# Patient Record
Sex: MALE | Race: WHITE | Employment: PART TIME | URBAN - METROPOLITAN AREA
[De-identification: names, ages, dates, MRNs, and addresses within clinical notes are randomized per-mention and may not be internally consistent; named-entity substitution may affect disease eponyms.]

---

## 2021-06-03 ENCOUNTER — HOSPITAL ENCOUNTER (OUTPATIENT)
Dept: LAB | Age: 61
Discharge: HOME | End: 2021-06-03

## 2021-06-03 PROCEDURE — 88305 TISSUE EXAM BY PATHOLOGIST: CPT

## 2022-06-06 ENCOUNTER — OFFICE VISIT (OUTPATIENT)
Dept: UROLOGY | Age: 62
End: 2022-06-06
Payer: COMMERCIAL

## 2022-06-06 DIAGNOSIS — R97.20 ELEVATED PSA: Primary | ICD-10-CM

## 2022-06-06 LAB
BILIRUBIN, URINE, POC: NEGATIVE
BLOOD URINE, POC: NORMAL
GLUCOSE URINE, POC: NEGATIVE
KETONES, URINE, POC: NEGATIVE
LEUKOCYTE ESTERASE, URINE, POC: NEGATIVE
NITRITE, URINE, POC: NEGATIVE
PH, URINE, POC: 7 (ref 4.6–8)
PROTEIN,URINE, POC: NEGATIVE
SPECIFIC GRAVITY, URINE, POC: 1.01 (ref 1–1.03)
URINALYSIS CLARITY, POC: CLEAR
URINALYSIS COLOR, POC: NORMAL
UROBILINOGEN, POC: NORMAL

## 2022-06-06 PROCEDURE — 99214 OFFICE O/P EST MOD 30 MIN: CPT | Performed by: UROLOGY

## 2022-06-06 PROCEDURE — 81003 URINALYSIS AUTO W/O SCOPE: CPT | Performed by: UROLOGY

## 2022-06-06 ASSESSMENT — ENCOUNTER SYMPTOMS
EYE PAIN: 0
COUGH: 0
INDIGESTION: 0
HEARTBURN: 0
ABDOMINAL PAIN: 0
WHEEZING: 0
BLOOD IN STOOL: 0
NAUSEA: 0
CONSTIPATION: 0
SHORTNESS OF BREATH: 0
SKIN LESIONS: 0
EYE DISCHARGE: 0
BACK PAIN: 1
VOMITING: 0
DIARRHEA: 0

## 2022-06-06 NOTE — PROGRESS NOTES
[facility]  [address]  [address] [facility], [address] [facility]  [phone]    Stevenson Nance  : 1960    Chief Complaint   Patient presents with    New Patient    Elevated PSA        HPI     Stevenson Nance is a 63 y.o. male Referred by Dr. Bhargav Maradiaga for evaluation and treatment of elevated PSA. PSA 7.5 in 4-22. No previous values noted. No voiding problems. No family hx of prostate cancer. No weight loss or bone pain . Past Medical History:   Diagnosis Date    Bell's palsy 2019    Chronic back pain     Hypertension      Past Surgical History:   Procedure Laterality Date    BACK SURGERY      OTHER SURGICAL HISTORY      surgery for broken nose     Current Outpatient Medications   Medication Sig Dispense Refill    valsartan (DIOVAN) 80 MG tablet Take 80 mg by mouth daily       No current facility-administered medications for this visit. No Known Allergies  Social History     Socioeconomic History    Marital status:      Spouse name: Not on file    Number of children: Not on file    Years of education: Not on file    Highest education level: Not on file   Occupational History    Not on file   Tobacco Use    Smoking status: Never Smoker    Smokeless tobacco: Never Used   Vaping Use    Vaping Use: Never used   Substance and Sexual Activity    Alcohol use: Never    Drug use: Never    Sexual activity: Not on file   Other Topics Concern    Not on file   Social History Narrative    Not on file     Social Determinants of Health     Financial Resource Strain:     Difficulty of Paying Living Expenses: Not on file   Food Insecurity:     Worried About [address] in the Last Year: Not on file    [address] in the Last Year: Not on file   Transportation Needs:     Lack of Transportation (Medical): Not on file    Lack of Transportation (Non-Medical):  Not on file   Physical Activity:     Days of Exercise per Week: Not on file    Minutes of Exercise per Session: Not on file   Stress:     Feeling of Stress : Not on file   Social Connections:     Frequency of Communication with Friends and Family: Not on file    Frequency of Social Gatherings with Friends and Family: Not on file    Attends Mormonism Services: Not on file    Active Member of [address] [place] [address] or Organizations: Not on file    Attends Club or Organization Meetings: Not on file    Marital Status: Not on file   Intimate Partner Violence:     Fear of Current or Ex-Partner: Not on file    Emotionally Abused: Not on file    Physically Abused: Not on file    Sexually Abused: Not on file   Housing Stability:     Unable to Pay for Housing in the Last Year: Not on file    Number of [place] in the Last Year: Not on file    Unstable Housing in the Last Year: Not on file     Family History   Problem Relation Age of Onset    Hypertension Mother     Suicide Father     Cancer Child         testicular       Review of Systems  Constitutional:   Negative for fever, chills, appetite change, malaise/fatigue, headaches and weight loss. Skin:  Negative for skin lesions, rash and itching. Eyes:  Negative for visual disturbance, eye pain and eye discharge. ENT:  Negative for difficulty articulating words, pain swallowing, high frequency hearing loss and dry mouth. Respiratory:  Negative for cough, blood in sputum, shortness of breath and wheezing. Cardiovascular: Positive for hypertension. Negative for chest pain, irregular heartbeat, leg pain, leg swelling, regular rate and rhythm and varicose veins. GI:  Negative for nausea, vomiting, abdominal pain, blood in stool, constipation, diarrhea, indigestion and heartburn.   Genitourinary:  Negative for urinary burning, hematuria, flank pain, recurrent UTIs, history of urolithiasis, nocturia, slower stream, straining, urgency, leakage w/ urge, frequent urination, incomplete emptying, erectile dysfunction, testicular pain, sexually transmitted disease, discharge and urethral stricture. Musculoskeletal: Positive for back pain. Negative for bone pain, arthralgias, tenderness, muscle weakness and neck pain. Neurological:  Negative for dizziness, focal weakness, numbness, seizures and tremors. Psychological:  Negative for depression and psychiatric problem. Endocrine:  Negative for cold intolerance, thirst, excessive urination, fatigue and heat intolerance. Hem/Lymphatic:  Negative for easy bleeding, easy bruising and frequent infections. There were no vitals taken for this visit. Physical Exam  General   Mental Status - Patient is alert and oriented X3. General Appearance - Not in acute distress. Build & Nutrition - Well nourished and Well developed. Gait - Normal.      Eye   Pupil - Bilateral - Normal, Equal and Round. Chest and Lung Exam   Auscultation:   Lung Sounds: - Normal, clear to auscultation. Cardiovascular   Cardiovascular examination reveals  - normal heart sounds, regular rate and rhythm with no murmurs. Abdomen   Palpation/Percussion: Palpation and Percussion of the abdomen reveal - No Rebound tenderness, No Rigidity (guarding), No hepatosplenomegaly and No Palpable abdominal masses. Auscultation: Auscultation of the abdomen reveals - Bowel sounds normal.      Male Genitourinary   Evaluation of genitourinary system reveals - scrotum non-tender, no masses, normal testes, no palpable masses, normal epididymides, urethral meatus normal, no discharge, normal penis and normal seminal vesicles. Rectal   Anorectal Exam: Prostate -not enlarged, no nodules    Peripheral Vascular   Lower Extremity: Inspection - Bilateral - Inspection Normal.      Neurologic   Neurologic evaluation reveals  - upper and lower extremity deep tendon reflexes intact bilaterally . Cranial Nerves: - Cranial nerves are grossly intact.       Neuropsychiatric   The patient's mood and affect are described as  - normal.      Musculoskeletal  Global Assessment   Examination of related systems reveals - no generalized swelling or edema of extremities. Lymphatic  General Lymphatics   Description - No Generalized lymphadenopathy. Tenderness - Non Tender. Urinalysis  UA - Dipstick  Results for orders placed or performed in visit on 06/06/22   AMB POC URINALYSIS DIP STICK AUTO W/O MICRO   Result Value Ref Range    Color (UA POC) Anna     Clarity (UA POC) Clear     Glucose, Urine, POC Negative Negative    Bilirubin, Urine, POC Negative Negative    KETONES, Urine, POC Negative Negative    Specific Gravity, Urine, POC 1.015 1.001 - 1.035    Blood (UA POC) Trace-lysed Negative    pH, Urine, POC 7.0 4.6 - 8.0    Protein, Urine, POC Negative Negative    Urobilinogen, POC Normal     Nitrite, Urine, POC Negative Negative    Leukocyte Esterase, Urine, POC Negative Negative       UA - Micro  WBC - 0  RBC - 0  Bacteria - 0  Epith - 0        Assessment and Plan    ICD-10-CM    1. Elevated PSA  R97.20 AMB POC URINALYSIS DIP STICK AUTO W/O MICRO     PSA, Diagnostic       Orders Placed This Encounter   Procedures    PSA, Diagnostic     Standing Status:   Future     Standing Expiration Date:   6/6/2023    AMB POC URINALYSIS DIP STICK AUTO W/O MICRO   will repeat PSA in one week. Call patient with results  May need MRI and/or biopsy. Follow-up and Dispositions    · Return for call patient to arrange follow-up.

## 2022-06-14 DIAGNOSIS — R97.20 ELEVATED PSA: Primary | ICD-10-CM

## 2022-06-15 ENCOUNTER — NURSE ONLY (OUTPATIENT)
Dept: UROLOGY | Age: 62
End: 2022-06-15

## 2022-06-15 DIAGNOSIS — R97.20 ELEVATED PSA: ICD-10-CM

## 2022-06-15 LAB — PSA SERPL-MCNC: 8.5 NG/ML

## 2022-06-21 ENCOUNTER — TELEPHONE (OUTPATIENT)
Dept: UROLOGY | Age: 62
End: 2022-06-21

## 2022-06-21 DIAGNOSIS — R97.20 ELEVATED PSA: Primary | ICD-10-CM

## 2022-06-29 ENCOUNTER — HOSPITAL ENCOUNTER (OUTPATIENT)
Dept: MRI IMAGING | Age: 62
Discharge: HOME | End: 2022-07-02
Payer: COMMERCIAL

## 2022-06-29 DIAGNOSIS — R97.20 ELEVATED PSA: ICD-10-CM

## 2022-06-29 PROCEDURE — 6360000004 HC RX CONTRAST MEDICATION: Performed by: UROLOGY

## 2022-06-29 PROCEDURE — 72197 MRI PELVIS W/O & W/DYE: CPT

## 2022-06-29 PROCEDURE — A9579 GAD-BASE MR CONTRAST NOS,1ML: HCPCS | Performed by: UROLOGY

## 2022-06-29 RX ADMIN — GADOTERIDOL 20 ML: 279.3 INJECTION, SOLUTION INTRAVENOUS at 13:53

## 2022-07-12 ENCOUNTER — TELEPHONE (OUTPATIENT)
Dept: UROLOGY | Age: 62
End: 2022-07-12

## 2022-07-12 DIAGNOSIS — R97.20 ELEVATED PSA: Primary | ICD-10-CM

## 2022-07-12 NOTE — TELEPHONE ENCOUNTER
Surgery Date: 08/04/2022    Pre/assessment date: Phone Assessment    BT/ASA: None    Surgery Scheduler: Juanpablo Rose

## 2022-08-01 NOTE — PERIOP NOTE
Patient verified name and . Order for consent  found in EHR and matches case posting; patient verifies procedure. Type 1a surgery, PAT phone assessment complete. Orders  received. Labs per surgeon: UA s/h for pre-op  Labs per anesthesia protocol: none indicated    Patient answered medical/surgical history questions at their best of ability. All prior to admission medications documented in [facility]. Patient instructed to take the following medications the day of surgery according to anesthesia guidelines with a small sip of water: none  Hold all vitamins 7 days prior to surgery and NSAIDS 5 days prior to surgery. Prescription meds to hold:Do not take valsartan on morning of procedure  Patient instructed on the following:    > Arrive at [facility], time of arrival to be called the day before by 1700  > NPO after midnight, unless otherwise indicated, including gum, mints, and ice chips  > Responsible adult must drive patient to the hospital, stay during surgery, and patient will need supervision 24 hours after anesthesia  > Use antibacterial soap in shower the night before surgery and on the morning of surgery  > All piercings must be removed prior to arrival.    > Leave all valuables (money and jewelry) at home but bring insurance card and ID on DOS.   > You may be required to pay a deductible or co-pay on the day of your procedure. You can pre-pay by calling [phone] if your surgery is at the [facility] or [phone] if your surgery is at the [facility]. > Do not wear make-up, nail polish, lotions, cologne, perfumes, powders, or oil on skin. Artificial nails are not permitted.

## 2022-08-03 NOTE — PERIOP NOTE
Directly informed patient and or family member of pre op arrival time 200 on 8/4. All questions answered. Pre op instructions reviewed. Left contact information for any additional questions or needs.

## 2022-08-04 ENCOUNTER — HOSPITAL ENCOUNTER (OUTPATIENT)
Age: 62
Setting detail: OUTPATIENT SURGERY
Discharge: HOME | End: 2022-08-04
Attending: UROLOGY | Admitting: UROLOGY
Payer: COMMERCIAL

## 2022-08-04 ENCOUNTER — ANESTHESIA EVENT (OUTPATIENT)
Dept: SURGERY | Age: 62
End: 2022-08-04
Payer: COMMERCIAL

## 2022-08-04 ENCOUNTER — ANESTHESIA (OUTPATIENT)
Dept: SURGERY | Age: 62
End: 2022-08-04
Payer: COMMERCIAL

## 2022-08-04 VITALS
RESPIRATION RATE: 16 BRPM | HEIGHT: 70 IN | SYSTOLIC BLOOD PRESSURE: 111 MMHG | TEMPERATURE: 98 F | WEIGHT: 224 LBS | BODY MASS INDEX: 32.07 KG/M2 | HEART RATE: 70 BPM | DIASTOLIC BLOOD PRESSURE: 67 MMHG | OXYGEN SATURATION: 96 %

## 2022-08-04 DIAGNOSIS — R97.20 ELEVATED PSA: ICD-10-CM

## 2022-08-04 LAB
APPEARANCE UR: CLEAR
BACTERIA URNS QL MICRO: NEGATIVE /HPF
BILIRUB UR QL: NEGATIVE
CASTS URNS QL MICRO: ABNORMAL /LPF
COLOR UR: ABNORMAL
EPI CELLS #/AREA URNS HPF: ABNORMAL /HPF
GLUCOSE UR STRIP.AUTO-MCNC: NEGATIVE MG/DL
HGB UR QL STRIP: NEGATIVE
KETONES UR QL STRIP.AUTO: NEGATIVE MG/DL
LEUKOCYTE ESTERASE UR QL STRIP.AUTO: ABNORMAL
NITRITE UR QL STRIP.AUTO: NEGATIVE
PH UR STRIP: 5 [PH] (ref 5–9)
PROT UR STRIP-MCNC: NEGATIVE MG/DL
RBC #/AREA URNS HPF: ABNORMAL /HPF
SP GR UR REFRACTOMETRY: 1.02 (ref 1–1.02)
UROBILINOGEN UR QL STRIP.AUTO: 1 EU/DL (ref 0.2–1)
WBC URNS QL MICRO: ABNORMAL /HPF

## 2022-08-04 PROCEDURE — 7100000001 HC PACU RECOVERY - ADDTL 15 MIN: Performed by: UROLOGY

## 2022-08-04 PROCEDURE — 55700 PR BIOPSY OF PROSTATE,NEEDLE/PUNCH: CPT | Performed by: UROLOGY

## 2022-08-04 PROCEDURE — 6370000000 HC RX 637 (ALT 250 FOR IP): Performed by: ANESTHESIOLOGY

## 2022-08-04 PROCEDURE — 3600000012 HC SURGERY LEVEL 2 ADDTL 15MIN: Performed by: UROLOGY

## 2022-08-04 PROCEDURE — 3700000000 HC ANESTHESIA ATTENDED CARE: Performed by: UROLOGY

## 2022-08-04 PROCEDURE — 6360000002 HC RX W HCPCS: Performed by: UROLOGY

## 2022-08-04 PROCEDURE — 2500000003 HC RX 250 WO HCPCS: Performed by: REGISTERED NURSE

## 2022-08-04 PROCEDURE — 3600000002 HC SURGERY LEVEL 2 BASE: Performed by: UROLOGY

## 2022-08-04 PROCEDURE — 7100000010 HC PHASE II RECOVERY - FIRST 15 MIN: Performed by: UROLOGY

## 2022-08-04 PROCEDURE — 6360000002 HC RX W HCPCS: Performed by: REGISTERED NURSE

## 2022-08-04 PROCEDURE — 7100000000 HC PACU RECOVERY - FIRST 15 MIN: Performed by: UROLOGY

## 2022-08-04 PROCEDURE — 3700000001 HC ADD 15 MINUTES (ANESTHESIA): Performed by: UROLOGY

## 2022-08-04 PROCEDURE — 2580000003 HC RX 258: Performed by: UROLOGY

## 2022-08-04 PROCEDURE — 2580000003 HC RX 258: Performed by: ANESTHESIOLOGY

## 2022-08-04 PROCEDURE — 81001 URINALYSIS AUTO W/SCOPE: CPT

## 2022-08-04 PROCEDURE — 88305 TISSUE EXAM BY PATHOLOGIST: CPT

## 2022-08-04 PROCEDURE — 2580000003 HC RX 258: Performed by: REGISTERED NURSE

## 2022-08-04 PROCEDURE — 7100000011 HC PHASE II RECOVERY - ADDTL 15 MIN: Performed by: UROLOGY

## 2022-08-04 PROCEDURE — 76942 ECHO GUIDE FOR BIOPSY: CPT | Performed by: UROLOGY

## 2022-08-04 PROCEDURE — 2709999900 HC NON-CHARGEABLE SUPPLY: Performed by: UROLOGY

## 2022-08-04 RX ORDER — OXYCODONE HYDROCHLORIDE 5 MG/1
5 TABLET ORAL PRN
Status: COMPLETED | OUTPATIENT
Start: 2022-08-04 | End: 2022-08-04

## 2022-08-04 RX ORDER — EPHEDRINE SULFATE/0.9% NACL/PF 50 MG/5 ML
SYRINGE (ML) INTRAVENOUS PRN
Status: DISCONTINUED | OUTPATIENT
Start: 2022-08-04 | End: 2022-08-04 | Stop reason: SDUPTHER

## 2022-08-04 RX ORDER — SODIUM CHLORIDE, SODIUM LACTATE, POTASSIUM CHLORIDE, CALCIUM CHLORIDE 600; 310; 30; 20 MG/100ML; MG/100ML; MG/100ML; MG/100ML
INJECTION, SOLUTION INTRAVENOUS CONTINUOUS
Status: DISCONTINUED | OUTPATIENT
Start: 2022-08-04 | End: 2022-08-04 | Stop reason: HOSPADM

## 2022-08-04 RX ORDER — OXYCODONE HYDROCHLORIDE 5 MG/1
10 TABLET ORAL PRN
Status: COMPLETED | OUTPATIENT
Start: 2022-08-04 | End: 2022-08-04

## 2022-08-04 RX ORDER — SODIUM CHLORIDE 0.9 % (FLUSH) 0.9 %
5-40 SYRINGE (ML) INJECTION PRN
Status: DISCONTINUED | OUTPATIENT
Start: 2022-08-04 | End: 2022-08-04 | Stop reason: HOSPADM

## 2022-08-04 RX ORDER — MIDAZOLAM HYDROCHLORIDE 2 MG/2ML
2 INJECTION, SOLUTION INTRAMUSCULAR; INTRAVENOUS
Status: DISCONTINUED | OUTPATIENT
Start: 2022-08-04 | End: 2022-08-04 | Stop reason: HOSPADM

## 2022-08-04 RX ORDER — LIDOCAINE HYDROCHLORIDE 20 MG/ML
INJECTION, SOLUTION EPIDURAL; INFILTRATION; INTRACAUDAL; PERINEURAL PRN
Status: DISCONTINUED | OUTPATIENT
Start: 2022-08-04 | End: 2022-08-04 | Stop reason: SDUPTHER

## 2022-08-04 RX ORDER — CIPROFLOXACIN 500 MG/1
500 TABLET, FILM COATED ORAL 2 TIMES DAILY
Qty: 6 TABLET | Refills: 0 | Status: SHIPPED | OUTPATIENT
Start: 2022-08-04 | End: 2022-08-07

## 2022-08-04 RX ORDER — ACETAMINOPHEN 500 MG
1000 TABLET ORAL ONCE
Status: COMPLETED | OUTPATIENT
Start: 2022-08-04 | End: 2022-08-04

## 2022-08-04 RX ORDER — FENTANYL CITRATE 50 UG/ML
100 INJECTION, SOLUTION INTRAMUSCULAR; INTRAVENOUS
Status: DISCONTINUED | OUTPATIENT
Start: 2022-08-04 | End: 2022-08-04 | Stop reason: HOSPADM

## 2022-08-04 RX ORDER — HYDROMORPHONE HYDROCHLORIDE 2 MG/ML
0.5 INJECTION, SOLUTION INTRAMUSCULAR; INTRAVENOUS; SUBCUTANEOUS EVERY 5 MIN PRN
Status: DISCONTINUED | OUTPATIENT
Start: 2022-08-04 | End: 2022-08-04 | Stop reason: HOSPADM

## 2022-08-04 RX ORDER — PROPOFOL 10 MG/ML
INJECTION, EMULSION INTRAVENOUS CONTINUOUS PRN
Status: DISCONTINUED | OUTPATIENT
Start: 2022-08-04 | End: 2022-08-04 | Stop reason: SDUPTHER

## 2022-08-04 RX ORDER — ONDANSETRON 2 MG/ML
4 INJECTION INTRAMUSCULAR; INTRAVENOUS
Status: DISCONTINUED | OUTPATIENT
Start: 2022-08-04 | End: 2022-08-04 | Stop reason: HOSPADM

## 2022-08-04 RX ORDER — PROPOFOL 10 MG/ML
INJECTION, EMULSION INTRAVENOUS PRN
Status: DISCONTINUED | OUTPATIENT
Start: 2022-08-04 | End: 2022-08-04 | Stop reason: SDUPTHER

## 2022-08-04 RX ORDER — DIPHENHYDRAMINE HYDROCHLORIDE 50 MG/ML
12.5 INJECTION INTRAMUSCULAR; INTRAVENOUS
Status: DISCONTINUED | OUTPATIENT
Start: 2022-08-04 | End: 2022-08-04 | Stop reason: HOSPADM

## 2022-08-04 RX ORDER — SODIUM CHLORIDE 0.9 % (FLUSH) 0.9 %
5-40 SYRINGE (ML) INJECTION EVERY 12 HOURS SCHEDULED
Status: DISCONTINUED | OUTPATIENT
Start: 2022-08-04 | End: 2022-08-04 | Stop reason: HOSPADM

## 2022-08-04 RX ADMIN — CEFTRIAXONE 1000 MG: 1 INJECTION, POWDER, FOR SOLUTION INTRAMUSCULAR; INTRAVENOUS at 11:33

## 2022-08-04 RX ADMIN — SODIUM CHLORIDE, SODIUM LACTATE, POTASSIUM CHLORIDE, AND CALCIUM CHLORIDE: 600; 310; 30; 20 INJECTION, SOLUTION INTRAVENOUS at 11:50

## 2022-08-04 RX ADMIN — OXYCODONE 5 MG: 5 TABLET ORAL at 12:30

## 2022-08-04 RX ADMIN — PROPOFOL 140 MCG/KG/MIN: 10 INJECTION, EMULSION INTRAVENOUS at 11:27

## 2022-08-04 RX ADMIN — SODIUM CHLORIDE, SODIUM LACTATE, POTASSIUM CHLORIDE, AND CALCIUM CHLORIDE: 600; 310; 30; 20 INJECTION, SOLUTION INTRAVENOUS at 09:28

## 2022-08-04 RX ADMIN — LIDOCAINE HYDROCHLORIDE 100 MG: 20 INJECTION, SOLUTION EPIDURAL; INFILTRATION; INTRACAUDAL; PERINEURAL at 11:27

## 2022-08-04 RX ADMIN — Medication 10 MG: at 11:41

## 2022-08-04 RX ADMIN — PHENYLEPHRINE HYDROCHLORIDE 50 MCG: 10 INJECTION INTRAVENOUS at 11:41

## 2022-08-04 RX ADMIN — ACETAMINOPHEN 1000 MG: 500 TABLET, FILM COATED ORAL at 09:10

## 2022-08-04 RX ADMIN — PROPOFOL 50 MG: 10 INJECTION, EMULSION INTRAVENOUS at 11:27

## 2022-08-04 ASSESSMENT — PAIN DESCRIPTION - LOCATION: LOCATION: BUTTOCKS

## 2022-08-04 ASSESSMENT — PAIN - FUNCTIONAL ASSESSMENT
PAIN_FUNCTIONAL_ASSESSMENT: 0-10
PAIN_FUNCTIONAL_ASSESSMENT: 0-10

## 2022-08-04 ASSESSMENT — PAIN SCALES - GENERAL
PAINLEVEL_OUTOF10: 4
PAINLEVEL_OUTOF10: 4

## 2022-08-04 NOTE — H&P
Gianni Chandler  : 1960         Chief Complaint   Patient presents with    New Patient    Elevated PSA         HPI      Gianni Chandler is a 63 y.o. male Referred by Dr. Malena Mayen for evaluation and treatment of elevated PSA. PSA 7.5 in . No previous values noted. No voiding problems. No family hx of prostate cancer. No weight loss or bone pain . Past Medical History:   Diagnosis Date    Bell's palsy 2019    Chronic back pain      Hypertension              Past Surgical History:   Procedure Laterality Date    BACK SURGERY        OTHER SURGICAL HISTORY         surgery for broken nose             Current Outpatient Medications   Medication Sig Dispense Refill    valsartan (DIOVAN) 80 MG tablet Take 80 mg by mouth daily          No current facility-administered medications for this visit. No Known Allergies  Social History            Socioeconomic History    Marital status:        Spouse name: Not on file    Number of children: Not on file    Years of education: Not on file    Highest education level: Not on file   Occupational History    Not on file   Tobacco Use    Smoking status: Never Smoker    Smokeless tobacco: Never Used   Vaping Use    Vaping Use: Never used   Substance and Sexual Activity    Alcohol use: Never    Drug use: Never    Sexual activity: Not on file   Other Topics Concern    Not on file   Social History Narrative    Not on file      Social Determinants of Health          Financial Resource Strain:    Difficulty of Paying Living Expenses: Not on file   Food Insecurity:    Worried About [address] in the Last Year: Not on file    Ran Out of Food in the Last Year: Not on file   Transportation Needs:    Lack of Transportation (Medical): Not on file    Lack of Transportation (Non-Medical):  Not on file   Physical Activity:    Days of Exercise per Week: Not on file    Minutes of Exercise per Session: Not on file   Stress:    Feeling of Stress : Not on file   Social Connections:    Frequency of Communication with Friends and Family: Not on file    Frequency of Social Gatherings with Friends and Family: Not on file    Attends Christian Services: Not on file    Active Member of Clubs or Organizations: Not on file    Attends Club or Organization Meetings: Not on file    Marital Status: Not on file   Intimate Partner Violence:    Fear of Current or Ex-Partner: Not on file    Emotionally Abused: Not on file    Physically Abused: Not on file    Sexually Abused: Not on file   Housing Stability:    Unable to Pay for Housing in the Last Year: Not on file    Number of [place] in the Last Year: Not on file    Unstable Housing in the Last Year: Not on file            Family History   Problem Relation Age of Onset    Hypertension Mother      Suicide Father      Cancer Child           testicular         Review of Systems  Constitutional:   Negative for fever, chills, appetite change, malaise/fatigue, headaches and weight loss. Skin:  Negative for skin lesions, rash and itching. Eyes:  Negative for visual disturbance, eye pain and eye discharge. ENT:  Negative for difficulty articulating words, pain swallowing, high frequency hearing loss and dry mouth. Respiratory:  Negative for cough, blood in sputum, shortness of breath and wheezing. Cardiovascular: Positive for hypertension. Negative for chest pain, irregular heartbeat, leg pain, leg swelling, regular rate and rhythm and varicose veins. GI:  Negative for nausea, vomiting, abdominal pain, blood in stool, constipation, diarrhea, indigestion and heartburn. Genitourinary:  Negative for urinary burning, hematuria, flank pain, recurrent UTIs, history of urolithiasis, nocturia, slower stream, straining, urgency, leakage w/ urge, frequent urination, incomplete emptying, erectile dysfunction, testicular pain, sexually transmitted disease, discharge and urethral stricture. Musculoskeletal: Positive for back pain.  Negative for bone pain, arthralgias, tenderness, muscle weakness and neck pain. Neurological:  Negative for dizziness, focal weakness, numbness, seizures and tremors. Psychological:  Negative for depression and psychiatric problem. Endocrine:  Negative for cold intolerance, thirst, excessive urination, fatigue and heat intolerance. Hem/Lymphatic:  Negative for easy bleeding, easy bruising and frequent infections. There were no vitals taken for this visit. Physical Exam  General   Mental Status - Patient is alert and oriented X3. General Appearance - Not in acute distress. Build & Nutrition - Well nourished and Well developed. Gait - Normal.        Eye   Pupil - Bilateral - Normal, Equal and Round. Chest and Lung Exam   Auscultation:   Lung Sounds: - Normal, clear to auscultation. Cardiovascular   Cardiovascular examination reveals  - normal heart sounds, regular rate and rhythm with no murmurs. Abdomen   Palpation/Percussion: Palpation and Percussion of the abdomen reveal - No Rebound tenderness, No Rigidity (guarding), No hepatosplenomegaly and No Palpable abdominal masses. Auscultation: Auscultation of the abdomen reveals - Bowel sounds normal.        Male Genitourinary   Evaluation of genitourinary system reveals - scrotum non-tender, no masses, normal testes, no palpable masses, normal epididymides, urethral meatus normal, no discharge, normal penis and normal seminal vesicles. Rectal   Anorectal Exam: Prostate -not enlarged, no nodules     Peripheral Vascular   Lower Extremity: Inspection - Bilateral - Inspection Normal.        Neurologic   Neurologic evaluation reveals  - upper and lower extremity deep tendon reflexes intact bilaterally . Cranial Nerves: - Cranial nerves are grossly intact.         Neuropsychiatric   The patient's mood and affect are described as  - normal.        Musculoskeletal  Global Assessment   Examination of related systems reveals - no generalized malignancy. 2.  PI-RADS 3 lesion within the left peripheral zone of the mid gland. 3.  No evidence fracture capsular extension or pelvic lymphadenopathy. Plan MRI fusion biopsy .

## 2022-08-04 NOTE — DISCHARGE INSTRUCTIONS
Prostate Biopsy and Ultrasound:   A prostate biopsy is a type of test. Your doctor takes small tissue samples from your prostate gland. Then another doctor looks at the tissue under a microscope to see if there are cancer cells. This test is done by a doctor who specializes in men's genital and urinary problems (urologist). It can be done in your doctor's office, a day surgery clinic, or a hospital operating room. To get the tissue samples from the prostate, the doctor inserts a thin needle through the rectum, the urethra, or the area between the anus and scrotum (perineum). The most common method is through the rectum. Your doctor may use ultrasound to help guide the needle. What else should you know about this test?  A prostate biopsy has a slight risk of causing problems such as infection or bleeding. If the biopsy went through your rectum, you may have a small amount of bleeding from your rectum for 2 to 3 days after the biopsy. You may have a little pain in your pelvic area. You may also have a little blood in your urine for 1 to 5 days. You may have some blood in your semen for a week or longer. Do not do heavy work or exercise for 4 hours after the test.  Your doctor will tell you how long it may take to get your results back. Follow-up care is a key part of your treatment and safety. Be sure to make and go to all appointments, and call your doctor if you are having problems. It's also a good idea to keep a list of the medicines you take. Ask your doctor when you can expect to have your test results.     After general anesthesia or intravenous sedation, for 24 hours or while taking prescription Narcotics:  Limit your activities  A responsible adult needs to be with you for the next 24 hours  Do not drive and operate hazardous machinery  Do not make important personal or business decisions  Do not drink alcoholic beverages  If you have not urinated within 8 hours after discharge, and you are experiencing discomfort from urinary retention, please go to the nearest ED. If you have sleep apnea and have a CPAP machine, please use it for all naps and sleeping. Please use caution when taking narcotics and any of your home medications that may cause drowsiness. *  Please give a list of your current medications to your Primary Care Provider. *  Please update this list whenever your medications are discontinued, doses are      changed, or new medications (including over-the-counter products) are added. *  Please carry medication information at all times in case of emergency situations. These are general instructions for a healthy lifestyle:  No smoking/ No tobacco products/ Avoid exposure to second hand smoke  Surgeon General's Warning:  Quitting smoking now greatly reduces serious risk to your health. Obesity, smoking, and sedentary lifestyle greatly increases your risk for illness  A healthy diet, regular physical exercise & weight monitoring are important for maintaining a healthy lifestyle    You may be retaining fluid if you have a history of heart failure or if you experience any of the following symptoms:  Weight gain of 3 pounds or more overnight or 5 pounds in a week, increased swelling in our hands or feet or shortness of breath while lying flat in bed. Please call your doctor as soon as you notice any of these symptoms; do not wait until your next office visit.

## 2022-08-04 NOTE — ANESTHESIA POSTPROCEDURE EVALUATION
Department of Anesthesiology  Postprocedure Note    Patient: Magnolia Martinez  MRN: [id number]  YOB: 1960  Date of evaluation: 8/4/2022      Procedure Summary     Date: 08/04/22 Room / Location: [facility] MAIN OR  / [facility] MAIN OR    Anesthesia Start: 1121 Anesthesia Stop: 1157    Procedure: MRI FUSION GUIDED BIOPSY (Rectum) Diagnosis:       Elevated PSA      (Elevated PSA [R97.20])    Providers: Parth Cadet MD Responsible Provider: Karen Benitez MD    Anesthesia Type: TIVA ASA Status: 2          Anesthesia Type: TIVA    Leatha Phase I: Leatha Score: 10    Leatha Phase II: Leatha Score: 10      Anesthesia Post Evaluation    Patient location during evaluation: PACU  Patient participation: complete - patient participated  Level of consciousness: awake and alert  Airway patency: patent  Nausea & Vomiting: no nausea and no vomiting  Complications: no  Cardiovascular status: hemodynamically stable  Respiratory status: acceptable, nonlabored ventilation and spontaneous ventilation  Hydration status: euvolemic  Comments: /67   Pulse 70   Temp 98 °F (36.7 °C) (Temporal)   Resp 16   Ht 5' 10\" (1.778 m)   Wt 224 lb (101.6 kg)   SpO2 96%   BMI 32.14 kg/m²     Multimodal analgesia pain management approach

## 2022-08-04 NOTE — PROGRESS NOTES
Discharge instructions reviewed with pt and family member who verbalize understanding of follow up care.  All questions answered

## 2022-08-04 NOTE — ANESTHESIA PRE PROCEDURE
Department of Anesthesiology  Preprocedure Note       Name:  Pennie Lyle   Age:  63 y.o.  :  1960                                          MRN:  [id number]         Date:  2022      Surgeon: Amaris Cedeño):  Tay Andino MD    Procedure: Procedure(s):  MRI FUSION GUIDED BIOPSY    Medications prior to admission:   Prior to Admission medications    Medication Sig Start Date End Date Taking?  Authorizing Provider   valsartan (DIOVAN) 80 MG tablet Take 80 mg by mouth daily 22   Ar Automatic Reconciliation       Current medications:    Current Facility-Administered Medications   Medication Dose Route Frequency Provider Last Rate Last Admin    fentaNYL (SUBLIMAZE) injection 100 mcg  100 mcg IntraVENous Once PRN Saw Tabares MD        lactated ringers infusion   IntraVENous Continuous Saw Tabares  mL/hr at 22 0928 New Bag at 22    sodium chloride flush 0.9 % injection 5-40 mL  5-40 mL IntraVENous 2 times per day Saw Tabares MD        sodium chloride flush 0.9 % injection 5-40 mL  5-40 mL IntraVENous PRN Saw Tabares MD        midazolam PF (VERSED) injection 2 mg  2 mg IntraVENous Once PRN Saw Tabares MD        cefTRIAXone (ROCEPHIN) 1,000 mg in sodium chloride 0.9 % 50 mL IVPB mini-bag  1,000 mg IntraVENous On Call to [address] [place] MD Reginald   Held at 22 0930       Allergies:  No Known Allergies    Problem List:    Patient Active Problem List   Diagnosis Code    Elevated PSA R97.20       Past Medical History:        Diagnosis Date    Bell's palsy 2019    Chronic back pain     Hypertension     medication    Personal history of COVID-19 2020    was hospitalized 1 week       Past Surgical History:        Procedure Laterality Date    BACK SURGERY      OTHER SURGICAL HISTORY      surgery for broken nose       Social History:    Social History     Tobacco Use    Smoking status: Never    Smokeless tobacco: Never   Substance Use Topics    Alcohol use: Never                                Counseling given: Not Answered      Vital Signs (Current):   Vitals:    08/01/22 0946 08/04/22 0906   BP:  (!) 138/90   Pulse:  78   Resp:  16   Temp:  97.6 °F (36.4 °C)   TempSrc:  Oral   SpO2:  96%   Weight: 228 lb (103.4 kg) 224 lb (101.6 kg)   Height: 5' 9\" (1.753 m) 5' 10\" (1.778 m)                                              BP Readings from Last 3 Encounters:   08/04/22 (!) 138/90   10/28/21 (!) 134/90   08/26/21 (!) 153/99       NPO Status: Time of last liquid consumption: 0000                        Time of last solid consumption: 0000                        Date of last liquid consumption: 08/03/22                        Date of last solid food consumption: 08/03/22    BMI:   Wt Readings from Last 3 Encounters:   08/04/22 224 lb (101.6 kg)   06/29/22 220 lb (99.8 kg)   10/28/21 220 lb (99.8 kg)     Body mass index is 32.14 kg/m². CBC:   Lab Results   Component Value Date/Time    WBC 6.8 04/27/2022 09:56 AM    RBC 5.50 04/27/2022 09:56 AM    HGB 16.8 04/27/2022 09:56 AM    HCT 49.0 04/27/2022 09:56 AM    MCV 89 04/27/2022 09:56 AM    RDW 13.1 04/27/2022 09:56 AM     04/27/2022 09:56 AM       CMP:   Lab Results   Component Value Date/Time     04/27/2022 09:56 AM    K 5.1 04/27/2022 09:56 AM     04/27/2022 09:56 AM    CO2 25 04/27/2022 09:56 AM    BUN 16 04/27/2022 09:56 AM    CREATININE 1.05 04/27/2022 09:56 AM    GFRAA 116 01/06/2021 09:43 AM    AGRATIO 1.7 04/27/2022 09:56 AM    GLUCOSE 92 04/27/2022 09:56 AM    PROT 7.1 04/27/2022 09:56 AM    CALCIUM 9.4 04/27/2022 09:56 AM    BILITOT 0.7 04/27/2022 09:56 AM    ALKPHOS 69 04/27/2022 09:56 AM    AST 24 04/27/2022 09:56 AM    ALT 36 04/27/2022 09:56 AM       POC Tests: No results for input(s): POCGLU, POCNA, POCK, POCCL, POCBUN, POCHEMO, POCHCT in the last 72 hours.     Coags: No results found for: PROTIME, INR, APTT    HCG (If Applicable): No results found for:

## 2022-08-04 NOTE — OP NOTE
Operative Note      Patient: Nancy Sher  YOB: 1960  MRN: [id number]    Date of Procedure: 8/4/2022    Pre-Op Diagnosis: Elevated PSA [R97.20]    Post-Op Diagnosis: Same       Procedure(s):  MRI FUSION GUIDED BIOPSY    Surgeon(s):  Milly Duran MD    Assistant:   * No surgical staff found *    Anesthesia: Monitor Anesthesia Care    Estimated Blood Loss (mL): Minimal    Complications: None    Specimens:   ID Type Source Tests Collected by Time Destination   A : DEAN #1 RIGHT BASE Tissue Prostate SURGICAL PATHOLOGY Milly Duran MD 8/4/2022 1133    B : DEAN #2 LEFT APEX Tissue Prostate SURGICAL PATHOLOGY Milly Duran, MD 8/4/2022 1133    C : LLB Tissue Prostate SURGICAL PATHOLOGY Milly Duran MD 8/4/2022 1135    D : LLM Tissue Prostate SURGICAL PATHOLOGY Milly Duran MD 8/4/2022 1135    E : LLA Tissue Prostate SURGICAL PATHOLOGY Milly Duran MD 8/4/2022 1136    F : LB Tissue Prostate SURGICAL PATHOLOGY Milly Duran MD 8/4/2022 1137    G : LM Tissue Prostate SURGICAL PATHOLOGY Milly Duran MD 8/4/2022 1138    H : LA Tissue Prostate SURGICAL PATHOLOGY Milly Duran MD 8/4/2022 1138    I : RLB Tissue Prostate SURGICAL PATHOLOGY Milly Duran MD 8/4/2022 1139    J : RLM Tissue Prostate SURGICAL PATHOLOGY Milly Duran MD 8/4/2022 1140    K : RLA Tissue Prostate SURGICAL PATHOLOGY Milly Duran MD 8/4/2022 1142    L : RB Tissue Prostate SURGICAL PATHOLOGY Milly Duran MD 8/4/2022 1148    M : RM Tissue Prostate SURGICAL PATHOLOGY Milly Duran MD 8/4/2022 1149    N : RA Tissue Prostate SURGICAL PATHOLOGY Milly Duran MD 8/4/2022 1150        Implants:  * No implants in log *      Drains: * No LDAs found *    Findings: see op note    Detailed Description of Procedure:   Operative Note                Patient: Nancy Sher, [id number]    Date of Surgery: 08/04/22    Preoperative Diagnosis: Elevated psa and prostate lesion(s) on MRI imaging    Postoperative Diagnosis:  same    Surgeon(s) and Role:     * Nancy Carrasco MD - Primary     Anesthesia:  IV sedation     Procedure: Procedure(s):  URONAV MRI fused TRUS prostate biopsy     RISKS DISCUSSION:     Discussed the risk of surgery including infection, hematoma, bleeding, and the risks of general anesthetic. The patient understands the risks, any and all questions were answered to the patient's satisfaction and they freely signed the consent for operation. URONAV MRI FUSED TRANSRECTAL ULTRASOUND GUIDED BIOPSY OF THE PROSTATE    All risks, benefits and alternatives were again reviewed and he is willing to proceed at this time. The patient was placed in the left lateral decubitus position. I then inserted the transrectal ultrasound probe into the rectum. The prostate was visualized. The prostate appeared homogenous in appearance. Ultrasonographic sweep of the prostate was performed to obtain images to link to MRI via URONAV. There were 2 regions of interest based upon MRI imaging (RB, LA)   4,3 biopsies of regions of interest were then obtained using the ultrasound images for guidance that had been linked to the previous MRI using Uronav. I then performed 12 needle core biopsies using a standard sextant biopsy format with traditional ultrasound images for guidance. The ultrasound probe was removed. The patient tolerated the procedure well.       PROSTATE VOLUME:  32 gm PSA 8.5  PSAD 0.27    Estimated Blood Loss:  minimal    Specimens: prostate biopsies             Drains: none                 Implants: * No implants in log *           Signed By: Nancy Carrasco MD              Electronically signed by Nena Barker MD on 8/4/2022 at 11:52 AM

## 2022-08-09 ENCOUNTER — PATIENT MESSAGE (OUTPATIENT)
Dept: UROLOGY | Age: 62
End: 2022-08-09

## 2022-08-11 NOTE — TELEPHONE ENCOUNTER
From: Laure Chen  To: Dr. Janelle Thorntona: 8/9/2022 1:56 PM EDT  Subject: Biopsy results    Dr. William Cain,    I just got my results on my chart. As you can imagine there is a lot on there and I dont understand what it all means. My wife has researched some and has read some about Clint scores and mine seem on the upper end. Can you please give me a call at [phone] to help us please understand. If you cant reach me you may call my wife at [phone] and talk to her. There is no way we can wait until the 22nd. You told my wife after my biopsy for us to call if we were feeling anxious. Thank you!

## 2022-08-22 ENCOUNTER — OFFICE VISIT (OUTPATIENT)
Dept: UROLOGY | Age: 62
End: 2022-08-22
Payer: COMMERCIAL

## 2022-08-22 DIAGNOSIS — C61 PROSTATE CANCER (HCC): Primary | ICD-10-CM

## 2022-08-22 PROCEDURE — 99214 OFFICE O/P EST MOD 30 MIN: CPT | Performed by: UROLOGY

## 2022-08-22 RX ORDER — SODIUM CHLORIDE 9 MG/ML
INJECTION, SOLUTION INTRAVENOUS PRN
Status: CANCELLED | OUTPATIENT
Start: 2022-08-22

## 2022-08-22 RX ORDER — SODIUM CHLORIDE 0.9 % (FLUSH) 0.9 %
5-40 SYRINGE (ML) INJECTION PRN
Status: CANCELLED | OUTPATIENT
Start: 2022-08-22

## 2022-08-22 RX ORDER — SODIUM CHLORIDE 0.9 % (FLUSH) 0.9 %
5-40 SYRINGE (ML) INJECTION EVERY 12 HOURS SCHEDULED
Status: CANCELLED | OUTPATIENT
Start: 2022-08-22

## 2022-08-22 ASSESSMENT — ENCOUNTER SYMPTOMS
NAUSEA: 0
BACK PAIN: 0

## 2022-08-22 NOTE — PROGRESS NOTES
[facility]  [address] [place] [address] Way  [address] [facility], [address] [facility]  [phone]    Cheyenne Cruz  : 1960    Chief Complaint   Patient presents with    Follow-up     Talk biopsy        HPI     Cheyenne Cruz is a 63 y.o. male Referred by Dr. Robi Ring for evaluation and treatment of elevated PSA. PSA 7.5 in . No previous values noted. No voiding problems. No family hx of prostate cancer. No weight loss or bone pain . Repeat PSA 8.5 in . MRI 22:   FINDINGS:   Last PSA: 7.5   Prostate Size: 4.0 x 4.2 x 2.7 cm with a calculated volume of  23.5 mL. PSA Density: 0.32 ng/mL       BPH: No significant BPH. Hemorrhage: None. Peripheral Zone: There is areas of T2 hyperintensity throughout both peripheral   zones. No suspicious lesions are described below. Transition/Central Zone: No suspicious transition zone lesion. Lesion # 1   -Size and location: A 1.5 x 1.3 x 1.2 cm T2 hypointense area within the right   peripheral zone of the gland base (series 4 image 15 and series 6 image 16). -DWI/ADC: Diffusion restriction. -DCE: The lesion demonstrates enhancement.   -Prostate Margin: No evidence of extracapsular extension. -PI-RADS Category: 5       Lesion # 2   -Size and location: A 0.9 x 0.7 cm T2 hypointense area within the left   peripheral zone of the mid gland (series 4 image 16). -DWI/ADC: Mild diffusion restriction. -DCE: No appreciable asymmetric enhancement.   -Prostate Margin: No evidence of extracapsular extension. -PI-RADS Category: 3       Seminal Vesicles: The seminal vesicles are unremarkable. Lymph Nodes: No appreciable pelvic lymphadenopathy. Other: Bilateral fat-containing inguinal hernias. Impression   1. PI-RADS 5 lesion within the right peripheral zone of the gland base,   suspicious for malignancy. 2.  PI-RADS 3 lesion within the left peripheral zone of the mid gland.    3.  No evidence fracture capsular extension or pelvic lymphadenopathy. Had MRI fusion prostate biopsy on 8-4-22: PROSTATE VOLUME:  32 gm PSA 8.5  PSAD 0.27  Path: DIAGNOSIS 555       A:  \"PROSTATE, REGION OF INTEREST #1 RIGHT BASE, BIOPSY\":               PROSTATIC ADENOCARCINOMA, BOB SCORE 4 + 3 = 7 (GRADE GROUP   3),                  INVOLVING <10% OF BIOPSY MATERIAL. B:  \"PROSTATE, REGION OF INTEREST #2 LEFT APEX, BIOPSY\":               FOCUS OF ATYPICAL GLANDS SUSPICIOUS FOR ADENOCARCINOMA. C:  \"PROSTATE, LEFT LATERAL BASE, BIOPSY\":               BENIGN PROSTATE TISSUE. D:  \"PROSTATE, LEFT LATERAL MID, BIOPSY\":               BENIGN PROSTATE TISSUE. E:  \"PROSTATE, LEFT LATERAL APEX, BIOPSY\":               BENIGN PROSTATE TISSUE. F:  \"PROSTATE, LEFT BASE, BIOPSY\":               BENIGN PROSTATE TISSUE. G:  \"PROSTATE, LEFT MID, BIOPSY\":               BENIGN PROSTATE TISSUE. H:  \"PROSTATE, LEFT APEX, BIOPSY\":               PROSTATIC ADENOCARCINOMA, BOB SCORE 3 + 3 = 6 (GRADE GROUP   1),                     INVOLVING LESS THAN 5% OF 1 CORE. I:  \"PROSTATE, RIGHT LATERAL BASE, BIOPSY\":               PROSTATIC ADENOCARCINOMA, BOB SCORE 4 + 3 = 7 (GRADE GROUP   3),                  INVOLVING 20% OF BIOPSY MATERIAL.             J:  \"PROSTATE, RIGHT LATERAL MID, BIOPSY\":               PROSTATIC ADENOCARCINOMA, BOB SCORE 4 + 4 = 8 (GRADE GROUP   4),                  DISCONTINUOUSLY INVOLVING 70% OF 1 CORE.                       K:  \"PROSTATE, RIGHT LATERAL APEX, BIOPSY\":               PROSTATIC ADENOCARCINOMA, BOB SCORE 4 + 3 = 7 (GRADE GROUP   3),                  DISCONTINUOUSLY INVOLVING 30% OF 1 CORE.          L:  \"PROSTATE, RIGHT BASE, BIOPSY\":               PROSTATIC ADENOCARCINOMA, BOB SCORE 3 + 3 = 6 (GRADE GROUP   1),                  INVOLVING <5 % OF 1 CORE.          M:  \"PROSTATE, RIGHT MID, BIOPSY\":             PROSTATIC ADENOCARCINOMA, BOB SCORE 4 + 3 = 7 (GRADE GROUP   3),                  DISCONTINUOUSLY INVOLVING 50% OF 1 CORE.          N:  \"PROSTATE, RIGHT APEX, BIOPSY\":             PROSTATIC ADENOCARCINOMA, BOB SCORE 3 + 4 = 7 (GRADE GROUP   2;                  PERCENT PATTERN 4: <10%), DISCONTINUOUSLY INVOLVING 80% OF        1 CORE. Past Medical History:   Diagnosis Date    Bell's palsy 06/2019    Chronic back pain     Hypertension     medication    Personal history of COVID-19 2020    was hospitalized 1 week     Past Surgical History:   Procedure Laterality Date    BACK SURGERY      OTHER SURGICAL HISTORY      surgery for broken nose    PROSTATE SURGERY N/A 8/4/2022    MRI FUSION GUIDED BIOPSY performed by Chaparrita Quesada MD at [facility] MAIN OR     Current Outpatient Medications   Medication Sig Dispense Refill    valsartan (DIOVAN) 80 MG tablet Take 80 mg by mouth daily       No current facility-administered medications for this visit.      No Known Allergies  Social History     Socioeconomic History    Marital status:      Spouse name: Not on file    Number of children: Not on file    Years of education: Not on file    Highest education level: Not on file   Occupational History    Not on file   Tobacco Use    Smoking status: Never    Smokeless tobacco: Never   Vaping Use    Vaping Use: Never used   Substance and Sexual Activity    Alcohol use: Never    Drug use: Never    Sexual activity: Not on file   Other Topics Concern    Not on file   Social History Narrative    Not on file     Social Determinants of Health     Financial Resource Strain: Not on file   Food Insecurity: Not on file   Transportation Needs: Not on file   Physical Activity: Not on file   Stress: Not on file   Social Connections: Not on file   Intimate Partner Violence: Not on file   Housing Stability: Not on file     Family History   Problem Relation Age of Onset    Hypertension Mother     Suicide Father     Cancer Child         testicular       Review of Systems  Constitutional:   Negative for fever. Cardiovascular:  Negative for chest pain. GI:  Negative for nausea. Genitourinary:  Negative for urinary burning. Musculoskeletal:  Negative for back pain. There were no vitals taken for this visit. Physical Exam  General   Mental Status - Patient is alert and oriented X3. Build & Nutrition - Well nourished. Chest and Lung Exam   Chest and lung exam reveals  - normal excursion with symmetric chest walls, quiet, even and easy respiratory effort with no use of accessory muscles and on auscultation, normal breath sounds, no adventitious sounds and normal vocal resonance. Cardiovascular   Cardiovascular examination reveals  - normal heart sounds, regular rate and rhythm with no murmurs. Abdomen   Palpation/Percussion: Palpation and Percussion of the abdomen reveal - Non Tender, No Rebound tenderness, No Rigidity (guarding), No hepatosplenomegaly, No Palpable abdominal masses and Soft. Hernia - Bilateral - No Hernia(s) present. Urinalysis  UA - Dipstick  Results for orders placed or performed in visit on 06/06/22   AMB POC URINALYSIS DIP STICK AUTO W/O MICRO   Result Value Ref Range    Color (UA POC) Anna     Clarity (UA POC) Clear     Glucose, Urine, POC Negative Negative    Bilirubin, Urine, POC Negative Negative    KETONES, Urine, POC Negative Negative    Specific Gravity, Urine, POC 1.015 1.001 - 1.035    Blood (UA POC) Trace-lysed Negative    pH, Urine, POC 7.0 4.6 - 8.0    Protein, Urine, POC Negative Negative    Urobilinogen, POC Normal     Nitrite, Urine, POC Negative Negative    Leukocyte Esterase, Urine, POC Negative Negative       UA - Micro  WBC - 0  RBC - 0  Bacteria - 0  Epith - 0    Physical Exam    Assessment and Plan    ICD-10-CM    1.  Prostate cancer ([facility] [address]  C61 NM BONE SCAN WHOLE BODY     PET CT PYLARIFY PSMA TUMOR IMAGE SKULL THIGH     CBC (Hemogram)     Type and screen     Urinalysis     Culture, Urine        Stage T 1c, Grade 4+4 =8 prostate cancer. Pathology results from the biopsy and all available treatment options were reviewed in detail with the patient today. Treatments options discussed but not limited to included surgery (open, perineal and robotic assisted laparoscopic approaches), external beam radiation, brachytherapy, proton beam therapy, cryosurgery, HIFU, androgen deprivation therapy and watchful waiting including active surveillance. I discussed all risks, possible side effects and benefits associated with the above treatment options. I specifically discussed the 30-40% risk of permanent ED in men without preoperative ED, 100% risk of ED in men with preoperative ED, 10-15% risk of permanent incontinence requiring pads, and 2-3% risk of severe incontinence in patients undergoing open or robotic radical prostatectomy. He is potent. No LUTS. Orders Placed This Encounter   Procedures    NM BONE SCAN WHOLE BODY     Standing Status:   Future     Standing Expiration Date:   8/22/2023     Order Specific Question:   Reason for exam:     Answer:   high grade prostate cancer    PET CT PYLARIFY PSMA TUMOR IMAGE SKULL THIGH     Pylarify     Standing Status:   Future     Standing Expiration Date:   8/22/2023     Order Specific Question:   Reason for exam:     Answer:   high grade prostate cancer. He would like to proceed with radical retropubic prostatectomy, bilateral lymphadenectomy. Will schedule the above studies, go ahead and look for surgery date. Follow-up and Dispositions    Return for call Melchor Contreras or Remedios Zheng to schedule surgery. Discussion lasted 40 minutes.

## 2022-08-30 ENCOUNTER — TELEPHONE (OUTPATIENT)
Dept: UROLOGY | Age: 62
End: 2022-08-30

## 2022-08-30 NOTE — TELEPHONE ENCOUNTER
----- Message from Thalia Cruz MD sent at 8/22/2022  5:12 PM EDT -----  radical retropubic prostatectomy, bilateral lymphadenectomy. ERAS  Need asst, 2.5 hrs.

## 2022-08-31 ENCOUNTER — HOSPITAL ENCOUNTER (OUTPATIENT)
Dept: NUCLEAR MEDICINE | Age: 62
Discharge: HOME | End: 2022-09-03
Payer: COMMERCIAL

## 2022-08-31 DIAGNOSIS — C61 PROSTATE CANCER (HCC): ICD-10-CM

## 2022-08-31 PROCEDURE — A9503 TC99M MEDRONATE: HCPCS | Performed by: UROLOGY

## 2022-08-31 PROCEDURE — 78306 BONE IMAGING WHOLE BODY: CPT | Performed by: UROLOGY

## 2022-08-31 PROCEDURE — 3430000000 HC RX DIAGNOSTIC RADIOPHARMACEUTICAL: Performed by: UROLOGY

## 2022-08-31 RX ORDER — TC 99M MEDRONATE 20 MG/10ML
25 INJECTION, POWDER, LYOPHILIZED, FOR SOLUTION INTRAVENOUS
Status: COMPLETED | OUTPATIENT
Start: 2022-08-31 | End: 2022-08-31

## 2022-08-31 RX ADMIN — TC 99M MEDRONATE 25 MILLICURIE: 20 INJECTION, POWDER, LYOPHILIZED, FOR SOLUTION INTRAVENOUS at 07:40

## 2022-09-02 ENCOUNTER — HOSPITAL ENCOUNTER (OUTPATIENT)
Dept: PET IMAGING | Age: 62
Discharge: HOME | End: 2022-09-05
Payer: COMMERCIAL

## 2022-09-02 DIAGNOSIS — C61 PROSTATE CANCER (HCC): ICD-10-CM

## 2022-09-02 PROCEDURE — 78815 PET IMAGE W/CT SKULL-THIGH: CPT

## 2022-09-02 PROCEDURE — 6360000002 HC RX W HCPCS: Performed by: UROLOGY

## 2022-09-02 PROCEDURE — 2580000003 HC RX 258: Performed by: UROLOGY

## 2022-09-02 PROCEDURE — A9594 HC RX W HCPCS: HCPCS | Performed by: UROLOGY

## 2022-09-02 RX ORDER — SODIUM CHLORIDE 0.9 % (FLUSH) 0.9 %
20 SYRINGE (ML) INJECTION AS NEEDED
Status: DISCONTINUED | OUTPATIENT
Start: 2022-09-02 | End: 2022-09-06 | Stop reason: HOSPADM

## 2022-09-02 RX ADMIN — SODIUM CHLORIDE, PRESERVATIVE FREE 20 ML: 5 INJECTION INTRAVENOUS at 07:58

## 2022-09-02 RX ADMIN — KIT FOR THE PREPARATION OF GALLIUM GA 68 GOZETOTIDE INJECTION 5.6 MILLICURIE: KIT INTRAVENOUS at 07:58

## 2022-09-07 PROBLEM — C61 PROSTATE CANCER (HCC): Status: ACTIVE | Noted: 2022-09-07

## 2022-09-08 DIAGNOSIS — C61 PROSTATE CANCER (HCC): Primary | ICD-10-CM

## 2022-09-08 RX ORDER — SODIUM CHLORIDE 9 MG/ML
INJECTION, SOLUTION INTRAVENOUS PRN
Status: CANCELLED | OUTPATIENT
Start: 2022-09-08

## 2022-09-08 RX ORDER — SODIUM CHLORIDE 0.9 % (FLUSH) 0.9 %
5-40 SYRINGE (ML) INJECTION EVERY 12 HOURS SCHEDULED
Status: CANCELLED | OUTPATIENT
Start: 2022-09-08

## 2022-09-08 RX ORDER — SODIUM CHLORIDE 0.9 % (FLUSH) 0.9 %
5-40 SYRINGE (ML) INJECTION PRN
Status: CANCELLED | OUTPATIENT
Start: 2022-09-08

## 2022-09-29 ENCOUNTER — HOSPITAL ENCOUNTER (OUTPATIENT)
Dept: PREADMISSION TESTING | Age: 62
Discharge: HOME | End: 2022-10-02
Payer: COMMERCIAL

## 2022-09-29 VITALS
TEMPERATURE: 96.9 F | SYSTOLIC BLOOD PRESSURE: 138 MMHG | HEART RATE: 66 BPM | WEIGHT: 228.2 LBS | HEIGHT: 71 IN | BODY MASS INDEX: 31.95 KG/M2 | OXYGEN SATURATION: 97 % | DIASTOLIC BLOOD PRESSURE: 85 MMHG | RESPIRATION RATE: 16 BRPM

## 2022-09-29 DIAGNOSIS — C61 PROSTATE CANCER (HCC): ICD-10-CM

## 2022-09-29 LAB
APPEARANCE UR: CLEAR
BACTERIA URNS QL MICRO: NEGATIVE /HPF
BILIRUB UR QL: NEGATIVE
CASTS URNS QL MICRO: ABNORMAL /LPF
COLOR UR: ABNORMAL
EPI CELLS #/AREA URNS HPF: ABNORMAL /HPF
ERYTHROCYTE [DISTWIDTH] IN BLOOD BY AUTOMATED COUNT: 13.2 % (ref 11.9–14.6)
GLUCOSE UR STRIP.AUTO-MCNC: NEGATIVE MG/DL
HCT VFR BLD AUTO: 48.6 % (ref 41.1–50.3)
HGB BLD-MCNC: 15.8 G/DL (ref 13.6–17.2)
HGB UR QL STRIP: NEGATIVE
KETONES UR QL STRIP.AUTO: NEGATIVE MG/DL
LEUKOCYTE ESTERASE UR QL STRIP.AUTO: ABNORMAL
MCH RBC QN AUTO: 29.3 PG (ref 26.1–32.9)
MCHC RBC AUTO-ENTMCNC: 32.5 G/DL (ref 31.4–35)
MCV RBC AUTO: 90.2 FL (ref 79.6–97.8)
NITRITE UR QL STRIP.AUTO: NEGATIVE
NRBC # BLD: 0 K/UL (ref 0–0.2)
PH UR STRIP: 7 [PH] (ref 5–9)
PLATELET # BLD AUTO: 299 K/UL (ref 150–450)
PMV BLD AUTO: 9.1 FL (ref 9.4–12.3)
PROT UR STRIP-MCNC: NEGATIVE MG/DL
RBC # BLD AUTO: 5.39 M/UL (ref 4.23–5.6)
RBC #/AREA URNS HPF: ABNORMAL /HPF
SP GR UR REFRACTOMETRY: 1.02 (ref 1–1.02)
UROBILINOGEN UR QL STRIP.AUTO: 1 EU/DL (ref 0.2–1)
WBC # BLD AUTO: 5.9 K/UL (ref 4.3–11.1)
WBC URNS QL MICRO: ABNORMAL /HPF

## 2022-09-29 PROCEDURE — 85027 COMPLETE CBC AUTOMATED: CPT

## 2022-09-29 PROCEDURE — 87086 URINE CULTURE/COLONY COUNT: CPT

## 2022-09-29 PROCEDURE — 81001 URINALYSIS AUTO W/SCOPE: CPT

## 2022-09-29 NOTE — PROGRESS NOTES
Preoperative Nutrition Screen (ALBERTO)   Patient's Age: 63 y.o. Last Serum Albumin Level:  Lab Results   Component Value Date/Time    YOANA 4.5 04/27/2022 09:56 AM     Patient's BMI: Estimated body mass index is 32.28 kg/m² as calculated from the following:    Height as of this encounter: 5' 10.5\" (1.791 m). Weight as of this encounter: 228 lb 3.2 oz (103.5 kg). If the answer to any of the following is Yes, then recommend prescribe Oral Nutrition Supplements (ONS) for at least 7 days prior to surgery and/or order referral to dietitian for further assessment and nutrition therapy. 1. Does the patient have a documented serum albumin less than 3.0 within the last 90 days? Unknown = 0      2. Is patient's BMI less than 18.5 (or less than 20 if age over 72)? No = 0       3. Has the patient had an unplanned weight loss of 10% of body weight or more in the last 6 months? No = 0   4. Has the patient been eating less than 50% of their normal diet in the preceding week?  No = 0   ALBERTO Score (number of Yes responses), 0-4 0     Plan:   No Nutrition Intervention indicated    Electronically signed by Romero Lopez RN on 9/29/22 at 10:13 AM EDT

## 2022-09-29 NOTE — PROGRESS NOTES
Enhanced Recovery After Surgery: non-diabetic patients    Drink Ensure Surgery Immunonutrition  - one bottle twice daily for 5 days prior to surgery . Do not drink any Ensure Surgery Immunonutrition the day before surgery. Ensure Surgery Immunonutrition is the preferred formula over other Ensure formulas as it is the only one that is designed to support immune health and recovery from surgery. The night before surgery 10/05/2022, drink 2 bottles of the Ensure Pre-Surgery drink. The morning of surgery 10/06/2022, drink one bottle of the Ensure Pre-Surgery drink while on your way to the hospital. Drink this over 5-10 minutes. Drink nothing else after drinking the pre-surgical drink the morning of surgery. Bring your patient handbook with you to the hospital.      Things to remember:    1. You will be up on a chair the evening of surgery and drinking clear liquids. Your diet will be advanced by your surgeon as appropriate. 2. Beginning the day after surgery, you will be up in a chair for all meals. 3. Beginning the day after surgery, you will be out of the bed for a minimum of 6 hours (not all at one time)    4. Beginning the day after surgery, you will walk in the cho in the cho at least 50' at least three times a day. 5. You will be given scheduled non-narcotic pain medication to help keep your pain under control. You will have stronger pain medication ordered for break through pain. 6. All of these measures are geared toward returning your bowel to normal function as soon as possible and to prevent complications associated with bowel blockage, blood clots, and/or pneumonia.

## 2022-09-29 NOTE — PROGRESS NOTES
Patient verified name and     Order for consent found in EHR and matches case posting; patient verified. Type 3 surgery, walk-in assessment complete. Labs per surgeon: UA, UCX, CBC, T/S s/h dos; results pending  Labs per anesthesia protocol: No additional;   EKG: Not needed at time of [facility] approved surgical skin cleanser and instructions given per hospital policy. Patient provided with and instructed on educational handouts including Guide to Surgery, Pain Management, Hand Hygiene, Blood Transfusion Education, and [facility] Brochure. Patient answered medical/surgical history questions at their best of ability. All prior to admission medications documented in [facility]. Original medication prescription bottle WERE visualized during patient appointment. Patient instructed to hold all vitamins 7 days prior to surgery and NSAIDS 5 days prior to surgery, patient verbalized understanding. Patient teach back successful and patient demonstrates knowledge of instructions. PLEASE CONTINUE TAKING ALL PRESCRIPTION MEDICATIONS UP TO THE DAY OF SURGERY UNLESS OTHERWISE DIRECTED BELOW. DISCONTINUE all vitamins and supplements 7 days prior to surgery. DISCONTINUE Non-Steriodal Anti-Inflammatory (NSAIDS) such as Advil and Aleve 5 days prior to surgery. Home Medications to take  the day of surgery               Home Medications   to Hold           Comments       On the day before surgery please take Acetaminophen 1000mg in the morning and then again before bed. You may substitute for Tylenol 650 mg. Hibiclens shower the night before surgery and the morning of surgery. Please do not bring home medications with you on the day of surgery unless otherwise directed by your nurse. If you are instructed to bring home medications, please give them to your nurse as they will be administered by the nursing staff.     If you have any questions, please call [facility] [phone] or [address] [place] [address] [phone]. A copy of this note was provided to the patient for reference.

## 2022-09-29 NOTE — PROGRESS NOTES
Latest Reference Range & Units 9/29/22 10:33   WBC 4.3 - 11.1 K/uL 5.9   RBC 4.23 - 5.6 M/uL 5.39   Hemoglobin Quant 13.6 - 17.2 g/dL 15.8   Hematocrit 41.1 - 50.3 % 48.6   MCV 79.6 - 97.8 FL 90.2   MCH 26.1 - 32.9 PG 29.3   MCHC 31.4 - 35.0 g/dL 32.5   MPV 9.4 - 12.3 FL 9.1 (L)   RDW 11.9 - 14.6 % 13.2   Platelet Count 908 - 450 K/uL 299   Nucleated Red Blood Cells 0.0 - 0.2 K/uL 0.00   CULTURE, URINE  Rpt   Color, UA -   YELLOW/STRAW   Glucose, UA mg/dL Negative   Bilirubin, Urine NEG   Negative   Ketones, Urine NEG mg/dL Negative   Specific Gravity, UA 1.001 - 1.023   1.016   Blood, Urine NEG   Negative   Protein, UA NEG mg/dL Negative   Urobilinogen, Urine 0.2 - 1.0 EU/dL 1.0   Nitrite, Urine NEG   Negative   Leukocyte Esterase, Urine NEG   SMALL !    Appearance -   CLEAR   pH, Urine 5.0 - 9.0   7.0   Casts 0 /lpf 0-2   WBC, UA 0 /hpf 5-10   RBC, UA 0 /hpf 0-5   Epithelial Cells, UA 0 /hpf 0-5   Bacteria, UA 0 /hpf Negative   (L): Data is abnormally low  !: Data is abnormal  Rpt: View report in Results Review for more information

## 2022-10-01 LAB
BACTERIA SPEC CULT: NORMAL
SERVICE CMNT-IMP: NORMAL

## 2022-10-03 ENCOUNTER — OFFICE VISIT (OUTPATIENT)
Dept: UROLOGY | Age: 62
End: 2022-10-03
Payer: COMMERCIAL

## 2022-10-03 DIAGNOSIS — C61 PROSTATE CANCER (HCC): ICD-10-CM

## 2022-10-03 DIAGNOSIS — R97.20 ELEVATED PSA: Primary | ICD-10-CM

## 2022-10-03 LAB
BILIRUBIN, URINE, POC: NEGATIVE
BLOOD URINE, POC: ABNORMAL
GLUCOSE URINE, POC: NEGATIVE
KETONES, URINE, POC: NEGATIVE
LEUKOCYTE ESTERASE, URINE, POC: NEGATIVE
NITRITE, URINE, POC: NEGATIVE
PH, URINE, POC: 6 (ref 4.6–8)
PROTEIN,URINE, POC: NEGATIVE
SPECIFIC GRAVITY, URINE, POC: 1.03 (ref 1–1.03)
URINALYSIS CLARITY, POC: ABNORMAL
URINALYSIS COLOR, POC: ABNORMAL
UROBILINOGEN, POC: ABNORMAL

## 2022-10-03 PROCEDURE — 99213 OFFICE O/P EST LOW 20 MIN: CPT | Performed by: UROLOGY

## 2022-10-03 PROCEDURE — 81002 URINALYSIS NONAUTO W/O SCOPE: CPT | Performed by: UROLOGY

## 2022-10-03 NOTE — PROGRESS NOTES
[facility]  [address]  [address] [facility] [address], [address] [facility]  [phone]    Elizabeth Bowser  : 1960    Chief Complaint   Patient presents with    H&P     Having surgery thursday        HPI     Elizabeth Bowser is a 63 y.o. male Referred by Dr. Juliano Bhandari for evaluation and treatment of elevated PSA. PSA 7.5 in . No previous values noted. No voiding problems. No family hx of prostate cancer. No weight loss or bone pain . GARFIELD showed no nodules. Repeat PSA 8.5 in . MRI 22:   FINDINGS:   Last PSA: 7.5   Prostate Size: 4.0 x 4.2 x 2.7 cm with a calculated volume of  23.5 mL. PSA Density: 0.32 ng/mL       BPH: No significant BPH. Hemorrhage: None. Peripheral Zone: There is areas of T2 hyperintensity throughout both peripheral   zones. No suspicious lesions are described below. Transition/Central Zone: No suspicious transition zone lesion. Lesion # 1   -Size and location: A 1.5 x 1.3 x 1.2 cm T2 hypointense area within the right   peripheral zone of the gland base (series 4 image 15 and series 6 image 16). -DWI/ADC: Diffusion restriction. -DCE: The lesion demonstrates enhancement.   -Prostate Margin: No evidence of extracapsular extension. -PI-RADS Category: 5       Lesion # 2   -Size and location: A 0.9 x 0.7 cm T2 hypointense area within the left   peripheral zone of the mid gland (series 4 image 16). -DWI/ADC: Mild diffusion restriction. -DCE: No appreciable asymmetric enhancement.   -Prostate Margin: No evidence of extracapsular extension. -PI-RADS Category: 3       Seminal Vesicles: The seminal vesicles are unremarkable. Lymph Nodes: No appreciable pelvic lymphadenopathy. Other: Bilateral fat-containing inguinal hernias. Impression   1. PI-RADS 5 lesion within the right peripheral zone of the gland base,   suspicious for malignancy. 2.  PI-RADS 3 lesion within the left peripheral zone of the mid gland.    3. No evidence fracture capsular extension or pelvic lymphadenopathy. Had MRI fusion prostate biopsy on 8-4-22: PROSTATE VOLUME:  32 gm PSA 8.5  PSAD 0.27  Path: DIAGNOSIS       A:  \"PROSTATE, REGION OF INTEREST #1 RIGHT BASE, BIOPSY\":               PROSTATIC ADENOCARCINOMA, BOB SCORE 4 + 3 = 7 (GRADE GROUP   3),                  INVOLVING <10% OF BIOPSY MATERIAL. B:  \"PROSTATE, REGION OF INTEREST #2 LEFT APEX, BIOPSY\":               FOCUS OF ATYPICAL GLANDS SUSPICIOUS FOR ADENOCARCINOMA. C:  \"PROSTATE, LEFT LATERAL BASE, BIOPSY\":               BENIGN PROSTATE TISSUE. D:  \"PROSTATE, LEFT LATERAL MID, BIOPSY\":               BENIGN PROSTATE TISSUE. E:  \"PROSTATE, LEFT LATERAL APEX, BIOPSY\":               BENIGN PROSTATE TISSUE. F:  \"PROSTATE, LEFT BASE, BIOPSY\":               BENIGN PROSTATE TISSUE. G:  \"PROSTATE, LEFT MID, BIOPSY\":               BENIGN PROSTATE TISSUE. H:  \"PROSTATE, LEFT APEX, BIOPSY\":               PROSTATIC ADENOCARCINOMA, BOB SCORE 3 + 3 = 6 (GRADE GROUP   1),                     INVOLVING LESS THAN 5% OF 1 CORE. I:  \"PROSTATE, RIGHT LATERAL BASE, BIOPSY\":               PROSTATIC ADENOCARCINOMA, BOB SCORE 4 + 3 = 7 (GRADE GROUP   3),                  INVOLVING 20% OF BIOPSY MATERIAL.             J:  \"PROSTATE, RIGHT LATERAL MID, BIOPSY\":               PROSTATIC ADENOCARCINOMA, BOB SCORE 4 + 4 = 8 (GRADE GROUP   4),                  DISCONTINUOUSLY INVOLVING 70% OF 1 CORE.                       K:  \"PROSTATE, RIGHT LATERAL APEX, BIOPSY\":               PROSTATIC ADENOCARCINOMA, BOB SCORE 4 + 3 = 7 (GRADE GROUP   3),                  DISCONTINUOUSLY INVOLVING 30% OF 1 CORE.          L:  \"PROSTATE, RIGHT BASE, BIOPSY\":               PROSTATIC ADENOCARCINOMA, BOB SCORE 3 + 3 = 6 (GRADE GROUP   1),                  INVOLVING <5 % OF 1 CORE.          M:  \"PROSTATE, RIGHT MID, BIOPSY\":             PROSTATIC Use: Never used   Substance and Sexual Activity    Alcohol use: Never    Drug use: Never    Sexual activity: Not on file   Other Topics Concern    Not on file   Social History Narrative    Not on file     Social Determinants of Health     Financial Resource Strain: Not on file   Food Insecurity: Not on file   Transportation Needs: Not on file   Physical Activity: Not on file   Stress: Not on file   Social Connections: Not on file   Intimate Partner Violence: Not on file   Housing Stability: Not on file     Family History   Problem Relation Age of Onset    Hypertension Mother     Suicide Father     Cancer Child         testicular       ROS    There were no vitals taken for this visit. Physical Exam  General   Mental Status - Patient is alert and oriented X3. Build & Nutrition - Well nourished. Chest and Lung Exam   Chest and lung exam reveals  - normal excursion with symmetric chest walls, quiet, even and easy respiratory effort with no use of accessory muscles and on auscultation, normal breath sounds, no adventitious sounds and normal vocal resonance. Cardiovascular   Cardiovascular examination reveals  - normal heart sounds, regular rate and rhythm with no murmurs. Abdomen   Palpation/Percussion: Palpation and Percussion of the abdomen reveal - Non Tender, No Rebound tenderness, No Rigidity (guarding), No hepatosplenomegaly, No Palpable abdominal masses and Soft. Hernia - Bilateral - No Hernia(s) present.     Urinalysis  UA - Dipstick  Results for orders placed or performed in visit on 06/06/22   AMB POC URINALYSIS DIP STICK AUTO W/O MICRO   Result Value Ref Range    Color (UA POC) Anna     Clarity (UA POC) Clear     Glucose, Urine, POC Negative Negative    Bilirubin, Urine, POC Negative Negative    KETONES, Urine, POC Negative Negative    Specific Gravity, Urine, POC 1.015 1.001 - 1.035    Blood (UA POC) Trace-lysed Negative    pH, Urine, POC 7.0 4.6 - 8.0    Protein, Urine, POC Negative Negative Urobilinogen, POC Normal     Nitrite, Urine, POC Negative Negative    Leukocyte Esterase, Urine, POC Negative Negative       UA - Micro  WBC - 0  RBC - 0  Bacteria - 0  Epith - 0    Physical Exam    Assessment and Plan    Jewell Balderrama was seen today for h&p. Diagnoses and all orders for this visit:    Elevated PSA  -     AMB POC URINALYSIS DIP STICK MANUAL W/O MICRO    Prostate cancer ([facility] [address]     Stage T 1c, Grade 4+4 =8 prostate cancer. Pathology results from the biopsy and all available treatment options were reviewed in detail with the patient today. Treatments options discussed but not limited to included surgery (open, perineal and robotic assisted laparoscopic approaches), external beam radiation, brachytherapy, proton beam therapy, cryosurgery, HIFU, androgen deprivation therapy and watchful waiting including active surveillance. I discussed all risks, possible side effects and benefits associated with the above treatment options. I specifically discussed the 30-40% risk of permanent ED in men without preoperative ED, 100% risk of ED in men with preoperative ED, 10-15% risk of permanent incontinence requiring pads, and 2-3% risk of severe incontinence in patients undergoing open or robotic radical prostatectomy. He is potent. No LUTS. Plan radical retropubic prostatectomy, bilateral pelvic LND. Staging studies negative for mets. Plan bilateral nerve-sparing. Follow-up and Dispositions    Return for call patient to arrange follow-up.

## 2022-10-04 ENCOUNTER — PATIENT MESSAGE (OUTPATIENT)
Dept: UROLOGY | Age: 62
End: 2022-10-04

## 2022-10-05 ENCOUNTER — ANESTHESIA EVENT (OUTPATIENT)
Dept: SURGERY | Age: 62
DRG: 708 | End: 2022-10-05
Payer: COMMERCIAL

## 2022-10-05 NOTE — PERIOP NOTE
Directly informed patient and or family member of pre op arrival time 0730 on 10/6. All questions answered. Pre op instructions reviewed. Left contact information for any additional questions or needs.

## 2022-10-06 ENCOUNTER — HOSPITAL ENCOUNTER (INPATIENT)
Age: 62
LOS: 3 days | Discharge: HOME | DRG: 708 | End: 2022-10-09
Attending: UROLOGY | Admitting: UROLOGY
Payer: COMMERCIAL

## 2022-10-06 ENCOUNTER — ANESTHESIA (OUTPATIENT)
Dept: SURGERY | Age: 62
DRG: 708 | End: 2022-10-06
Payer: COMMERCIAL

## 2022-10-06 DIAGNOSIS — C61 PROSTATE CANCER (HCC): ICD-10-CM

## 2022-10-06 LAB
ABO + RH BLD: NORMAL
ANION GAP SERPL CALC-SCNC: 10 MMOL/L (ref 4–13)
BLOOD GROUP ANTIBODIES SERPL: NORMAL
BUN SERPL-MCNC: 14 MG/DL (ref 8–23)
CALCIUM SERPL-MCNC: 7.9 MG/DL (ref 8.3–10.4)
CHLORIDE SERPL-SCNC: 108 MMOL/L (ref 101–110)
CO2 SERPL-SCNC: 23 MMOL/L (ref 21–32)
CREAT SERPL-MCNC: 1.2 MG/DL (ref 0.8–1.5)
GLUCOSE SERPL-MCNC: 163 MG/DL (ref 65–100)
HCT VFR BLD AUTO: 43.9 % (ref 41.1–50.3)
HGB BLD-MCNC: 14.4 G/DL (ref 13.6–17.2)
POTASSIUM SERPL-SCNC: 4 MMOL/L (ref 3.5–5.1)
SODIUM SERPL-SCNC: 141 MMOL/L (ref 136–145)
SPECIMEN EXP DATE BLD: NORMAL

## 2022-10-06 PROCEDURE — 6360000002 HC RX W HCPCS

## 2022-10-06 PROCEDURE — 88305 TISSUE EXAM BY PATHOLOGIST: CPT

## 2022-10-06 PROCEDURE — 76942 ECHO GUIDE FOR BIOPSY: CPT | Performed by: ANESTHESIOLOGY

## 2022-10-06 PROCEDURE — 7100000000 HC PACU RECOVERY - FIRST 15 MIN: Performed by: UROLOGY

## 2022-10-06 PROCEDURE — 2500000003 HC RX 250 WO HCPCS

## 2022-10-06 PROCEDURE — 55845 EXTENSIVE PROSTATE SURGERY: CPT | Performed by: UROLOGY

## 2022-10-06 PROCEDURE — 6370000000 HC RX 637 (ALT 250 FOR IP): Performed by: ANESTHESIOLOGY

## 2022-10-06 PROCEDURE — 2580000003 HC RX 258: Performed by: ANESTHESIOLOGY

## 2022-10-06 PROCEDURE — 6360000002 HC RX W HCPCS: Performed by: UROLOGY

## 2022-10-06 PROCEDURE — 2580000003 HC RX 258: Performed by: UROLOGY

## 2022-10-06 PROCEDURE — 3700000000 HC ANESTHESIA ATTENDED CARE: Performed by: UROLOGY

## 2022-10-06 PROCEDURE — 88309 TISSUE EXAM BY PATHOLOGIST: CPT

## 2022-10-06 PROCEDURE — 85018 HEMOGLOBIN: CPT

## 2022-10-06 PROCEDURE — 1100000000 HC RM PRIVATE

## 2022-10-06 PROCEDURE — 6370000000 HC RX 637 (ALT 250 FOR IP): Performed by: UROLOGY

## 2022-10-06 PROCEDURE — 80048 BASIC METABOLIC PNL TOTAL CA: CPT

## 2022-10-06 PROCEDURE — 3700000001 HC ADD 15 MINUTES (ANESTHESIA): Performed by: UROLOGY

## 2022-10-06 PROCEDURE — 2580000003 HC RX 258

## 2022-10-06 PROCEDURE — 3600000004 HC SURGERY LEVEL 4 BASE: Performed by: UROLOGY

## 2022-10-06 PROCEDURE — 3600000014 HC SURGERY LEVEL 4 ADDTL 15MIN: Performed by: UROLOGY

## 2022-10-06 PROCEDURE — 36415 COLL VENOUS BLD VENIPUNCTURE: CPT

## 2022-10-06 PROCEDURE — 7100000001 HC PACU RECOVERY - ADDTL 15 MIN: Performed by: UROLOGY

## 2022-10-06 PROCEDURE — 2709999900 HC NON-CHARGEABLE SUPPLY: Performed by: UROLOGY

## 2022-10-06 PROCEDURE — 0VT00ZZ RESECTION OF PROSTATE, OPEN APPROACH: ICD-10-PCS | Performed by: UROLOGY

## 2022-10-06 PROCEDURE — 07BC0ZX EXCISION OF PELVIS LYMPHATIC, OPEN APPROACH, DIAGNOSTIC: ICD-10-PCS | Performed by: UROLOGY

## 2022-10-06 PROCEDURE — 6360000002 HC RX W HCPCS: Performed by: ANESTHESIOLOGY

## 2022-10-06 PROCEDURE — 88307 TISSUE EXAM BY PATHOLOGIST: CPT

## 2022-10-06 PROCEDURE — 2720000010 HC SURG SUPPLY STERILE: Performed by: UROLOGY

## 2022-10-06 PROCEDURE — 2500000003 HC RX 250 WO HCPCS: Performed by: ANESTHESIOLOGY

## 2022-10-06 PROCEDURE — 86901 BLOOD TYPING SEROLOGIC RH(D): CPT

## 2022-10-06 PROCEDURE — 0VB30ZZ EXCISION OF BILATERAL SEMINAL VESICLES, OPEN APPROACH: ICD-10-PCS | Performed by: UROLOGY

## 2022-10-06 PROCEDURE — 0VBQ0ZZ EXCISION OF BILATERAL VAS DEFERENS, OPEN APPROACH: ICD-10-PCS | Performed by: UROLOGY

## 2022-10-06 RX ORDER — ONDANSETRON 4 MG/1
4 TABLET, ORALLY DISINTEGRATING ORAL EVERY 4 HOURS PRN
Status: DISCONTINUED | OUTPATIENT
Start: 2022-10-06 | End: 2022-10-09 | Stop reason: HOSPADM

## 2022-10-06 RX ORDER — KETAMINE HYDROCHLORIDE 50 MG/ML
INJECTION, SOLUTION, CONCENTRATE INTRAMUSCULAR; INTRAVENOUS PRN
Status: DISCONTINUED | OUTPATIENT
Start: 2022-10-06 | End: 2022-10-06 | Stop reason: SDUPTHER

## 2022-10-06 RX ORDER — ONDANSETRON 2 MG/ML
4 INJECTION INTRAMUSCULAR; INTRAVENOUS EVERY 4 HOURS PRN
Status: DISCONTINUED | OUTPATIENT
Start: 2022-10-06 | End: 2022-10-09 | Stop reason: HOSPADM

## 2022-10-06 RX ORDER — FENTANYL CITRATE 50 UG/ML
100 INJECTION, SOLUTION INTRAMUSCULAR; INTRAVENOUS
Status: COMPLETED | OUTPATIENT
Start: 2022-10-06 | End: 2022-10-06

## 2022-10-06 RX ORDER — BUPIVACAINE HYDROCHLORIDE AND EPINEPHRINE 2.5; 5 MG/ML; UG/ML
INJECTION, SOLUTION EPIDURAL; INFILTRATION; INTRACAUDAL; PERINEURAL
Status: COMPLETED | OUTPATIENT
Start: 2022-10-06 | End: 2022-10-06

## 2022-10-06 RX ORDER — SODIUM CHLORIDE 0.9 % (FLUSH) 0.9 %
5-40 SYRINGE (ML) INJECTION EVERY 12 HOURS SCHEDULED
Status: DISCONTINUED | OUTPATIENT
Start: 2022-10-06 | End: 2022-10-06 | Stop reason: HOSPADM

## 2022-10-06 RX ORDER — KETOROLAC TROMETHAMINE 30 MG/ML
30 INJECTION, SOLUTION INTRAMUSCULAR; INTRAVENOUS EVERY 6 HOURS
Status: DISCONTINUED | OUTPATIENT
Start: 2022-10-06 | End: 2022-10-09 | Stop reason: HOSPADM

## 2022-10-06 RX ORDER — SODIUM CHLORIDE 0.9 % (FLUSH) 0.9 %
5-40 SYRINGE (ML) INJECTION PRN
Status: DISCONTINUED | OUTPATIENT
Start: 2022-10-06 | End: 2022-10-06 | Stop reason: HOSPADM

## 2022-10-06 RX ORDER — SODIUM CHLORIDE 9 MG/ML
INJECTION, SOLUTION INTRAVENOUS PRN
Status: DISCONTINUED | OUTPATIENT
Start: 2022-10-06 | End: 2022-10-06 | Stop reason: HOSPADM

## 2022-10-06 RX ORDER — GLYCOPYRROLATE 0.2 MG/ML
INJECTION INTRAMUSCULAR; INTRAVENOUS PRN
Status: DISCONTINUED | OUTPATIENT
Start: 2022-10-06 | End: 2022-10-06 | Stop reason: SDUPTHER

## 2022-10-06 RX ORDER — SCOLOPAMINE TRANSDERMAL SYSTEM 1 MG/1
1 PATCH, EXTENDED RELEASE TRANSDERMAL
Status: DISCONTINUED | OUTPATIENT
Start: 2022-10-06 | End: 2022-10-06

## 2022-10-06 RX ORDER — FENTANYL CITRATE 50 UG/ML
25 INJECTION, SOLUTION INTRAMUSCULAR; INTRAVENOUS EVERY 5 MIN PRN
Status: DISCONTINUED | OUTPATIENT
Start: 2022-10-06 | End: 2022-10-06 | Stop reason: HOSPADM

## 2022-10-06 RX ORDER — ACETAMINOPHEN 500 MG
1000 TABLET ORAL EVERY 6 HOURS
Status: DISCONTINUED | OUTPATIENT
Start: 2022-10-06 | End: 2022-10-09 | Stop reason: HOSPADM

## 2022-10-06 RX ORDER — METOCLOPRAMIDE HYDROCHLORIDE 5 MG/ML
10 INJECTION INTRAMUSCULAR; INTRAVENOUS
Status: DISCONTINUED | OUTPATIENT
Start: 2022-10-06 | End: 2022-10-06 | Stop reason: HOSPADM

## 2022-10-06 RX ORDER — ROCURONIUM BROMIDE 10 MG/ML
INJECTION, SOLUTION INTRAVENOUS PRN
Status: DISCONTINUED | OUTPATIENT
Start: 2022-10-06 | End: 2022-10-06 | Stop reason: SDUPTHER

## 2022-10-06 RX ORDER — APREPITANT 40 MG/1
80 CAPSULE ORAL ONCE
Status: COMPLETED | OUTPATIENT
Start: 2022-10-06 | End: 2022-10-06

## 2022-10-06 RX ORDER — LIDOCAINE HYDROCHLORIDE ANHYDROUS AND DEXTROSE MONOHYDRATE .4; 5 G/100ML; G/100ML
1 INJECTION, SOLUTION INTRAVENOUS CONTINUOUS
Status: ACTIVE | OUTPATIENT
Start: 2022-10-06 | End: 2022-10-07

## 2022-10-06 RX ORDER — SODIUM CHLORIDE 0.9 % (FLUSH) 0.9 %
5-40 SYRINGE (ML) INJECTION EVERY 12 HOURS SCHEDULED
Status: DISCONTINUED | OUTPATIENT
Start: 2022-10-06 | End: 2022-10-09 | Stop reason: HOSPADM

## 2022-10-06 RX ORDER — ONDANSETRON 2 MG/ML
4 INJECTION INTRAMUSCULAR; INTRAVENOUS
Status: DISCONTINUED | OUTPATIENT
Start: 2022-10-06 | End: 2022-10-06 | Stop reason: HOSPADM

## 2022-10-06 RX ORDER — MIDAZOLAM HYDROCHLORIDE 2 MG/2ML
2 INJECTION, SOLUTION INTRAMUSCULAR; INTRAVENOUS
Status: COMPLETED | OUTPATIENT
Start: 2022-10-06 | End: 2022-10-06

## 2022-10-06 RX ORDER — ONDANSETRON 2 MG/ML
INJECTION INTRAMUSCULAR; INTRAVENOUS PRN
Status: DISCONTINUED | OUTPATIENT
Start: 2022-10-06 | End: 2022-10-06 | Stop reason: SDUPTHER

## 2022-10-06 RX ORDER — VALSARTAN 160 MG/1
80 TABLET ORAL DAILY
Status: DISCONTINUED | OUTPATIENT
Start: 2022-10-07 | End: 2022-10-09 | Stop reason: HOSPADM

## 2022-10-06 RX ORDER — BISACODYL 10 MG
10 SUPPOSITORY, RECTAL RECTAL DAILY PRN
Status: DISCONTINUED | OUTPATIENT
Start: 2022-10-06 | End: 2022-10-09 | Stop reason: HOSPADM

## 2022-10-06 RX ORDER — LIDOCAINE HYDROCHLORIDE ANHYDROUS AND DEXTROSE MONOHYDRATE .4; 5 G/100ML; G/100ML
INJECTION, SOLUTION INTRAVENOUS CONTINUOUS PRN
Status: DISCONTINUED | OUTPATIENT
Start: 2022-10-06 | End: 2022-10-06 | Stop reason: SDUPTHER

## 2022-10-06 RX ORDER — DEXAMETHASONE SODIUM PHOSPHATE 10 MG/ML
INJECTION INTRAMUSCULAR; INTRAVENOUS PRN
Status: DISCONTINUED | OUTPATIENT
Start: 2022-10-06 | End: 2022-10-06 | Stop reason: SDUPTHER

## 2022-10-06 RX ORDER — OXYCODONE HYDROCHLORIDE 5 MG/1
10 TABLET ORAL PRN
Status: DISCONTINUED | OUTPATIENT
Start: 2022-10-06 | End: 2022-10-06 | Stop reason: HOSPADM

## 2022-10-06 RX ORDER — OXYCODONE HYDROCHLORIDE 5 MG/1
5 TABLET ORAL EVERY 4 HOURS PRN
Status: DISCONTINUED | OUTPATIENT
Start: 2022-10-06 | End: 2022-10-09 | Stop reason: HOSPADM

## 2022-10-06 RX ORDER — LIDOCAINE HYDROCHLORIDE 20 MG/ML
INJECTION, SOLUTION EPIDURAL; INFILTRATION; INTRACAUDAL; PERINEURAL PRN
Status: DISCONTINUED | OUTPATIENT
Start: 2022-10-06 | End: 2022-10-06 | Stop reason: SDUPTHER

## 2022-10-06 RX ORDER — OXYCODONE HYDROCHLORIDE 5 MG/1
5 TABLET ORAL PRN
Status: DISCONTINUED | OUTPATIENT
Start: 2022-10-06 | End: 2022-10-06 | Stop reason: HOSPADM

## 2022-10-06 RX ORDER — HYDROMORPHONE HYDROCHLORIDE 2 MG/ML
0.5 INJECTION, SOLUTION INTRAMUSCULAR; INTRAVENOUS; SUBCUTANEOUS EVERY 10 MIN PRN
Status: DISCONTINUED | OUTPATIENT
Start: 2022-10-06 | End: 2022-10-06 | Stop reason: HOSPADM

## 2022-10-06 RX ORDER — DIPHENHYDRAMINE HYDROCHLORIDE 50 MG/ML
12.5 INJECTION INTRAMUSCULAR; INTRAVENOUS
Status: DISCONTINUED | OUTPATIENT
Start: 2022-10-06 | End: 2022-10-06 | Stop reason: HOSPADM

## 2022-10-06 RX ORDER — SODIUM CHLORIDE, SODIUM LACTATE, POTASSIUM CHLORIDE, CALCIUM CHLORIDE 600; 310; 30; 20 MG/100ML; MG/100ML; MG/100ML; MG/100ML
INJECTION, SOLUTION INTRAVENOUS CONTINUOUS
Status: DISCONTINUED | OUTPATIENT
Start: 2022-10-06 | End: 2022-10-09 | Stop reason: HOSPADM

## 2022-10-06 RX ORDER — PROPOFOL 10 MG/ML
INJECTION, EMULSION INTRAVENOUS PRN
Status: DISCONTINUED | OUTPATIENT
Start: 2022-10-06 | End: 2022-10-06 | Stop reason: SDUPTHER

## 2022-10-06 RX ORDER — ACETAMINOPHEN 500 MG
1000 TABLET ORAL ONCE
Status: COMPLETED | OUTPATIENT
Start: 2022-10-06 | End: 2022-10-06

## 2022-10-06 RX ORDER — NEOSTIGMINE METHYLSULFATE 1 MG/ML
INJECTION, SOLUTION INTRAVENOUS PRN
Status: DISCONTINUED | OUTPATIENT
Start: 2022-10-06 | End: 2022-10-06 | Stop reason: SDUPTHER

## 2022-10-06 RX ORDER — HYDROMORPHONE HYDROCHLORIDE 1 MG/ML
0.5 INJECTION, SOLUTION INTRAMUSCULAR; INTRAVENOUS; SUBCUTANEOUS
Status: DISCONTINUED | OUTPATIENT
Start: 2022-10-06 | End: 2022-10-09 | Stop reason: HOSPADM

## 2022-10-06 RX ORDER — SODIUM CHLORIDE, SODIUM LACTATE, POTASSIUM CHLORIDE, CALCIUM CHLORIDE 600; 310; 30; 20 MG/100ML; MG/100ML; MG/100ML; MG/100ML
INJECTION, SOLUTION INTRAVENOUS CONTINUOUS
Status: DISCONTINUED | OUTPATIENT
Start: 2022-10-06 | End: 2022-10-06 | Stop reason: HOSPADM

## 2022-10-06 RX ORDER — SODIUM CHLORIDE, SODIUM LACTATE, POTASSIUM CHLORIDE, CALCIUM CHLORIDE 600; 310; 30; 20 MG/100ML; MG/100ML; MG/100ML; MG/100ML
INJECTION, SOLUTION INTRAVENOUS CONTINUOUS PRN
Status: DISCONTINUED | OUTPATIENT
Start: 2022-10-06 | End: 2022-10-06 | Stop reason: SDUPTHER

## 2022-10-06 RX ADMIN — KETOROLAC TROMETHAMINE 30 MG: 30 INJECTION, SOLUTION INTRAMUSCULAR at 20:33

## 2022-10-06 RX ADMIN — LIDOCAINE HYDROCHLORIDE 1 MG/KG/HR: 4 INJECTION, SOLUTION INTRAVENOUS at 10:34

## 2022-10-06 RX ADMIN — KETAMINE HYDROCHLORIDE 15 MG: 50 INJECTION, SOLUTION INTRAMUSCULAR; INTRAVENOUS at 11:25

## 2022-10-06 RX ADMIN — SODIUM CHLORIDE, SODIUM LACTATE, POTASSIUM CHLORIDE, AND CALCIUM CHLORIDE: 600; 310; 30; 20 INJECTION, SOLUTION INTRAVENOUS at 08:26

## 2022-10-06 RX ADMIN — ONDANSETRON 4 MG: 2 INJECTION INTRAMUSCULAR; INTRAVENOUS at 13:04

## 2022-10-06 RX ADMIN — SODIUM CHLORIDE, SODIUM LACTATE, POTASSIUM CHLORIDE, AND CALCIUM CHLORIDE: 600; 310; 30; 20 INJECTION, SOLUTION INTRAVENOUS at 11:44

## 2022-10-06 RX ADMIN — BUPIVACAINE HYDROCHLORIDE AND EPINEPHRINE BITARTRATE 23 ML: 2.5; .005 INJECTION, SOLUTION EPIDURAL; INFILTRATION; INTRACAUDAL; PERINEURAL at 09:23

## 2022-10-06 RX ADMIN — KETAMINE HYDROCHLORIDE 20 MG: 50 INJECTION, SOLUTION INTRAMUSCULAR; INTRAVENOUS at 10:23

## 2022-10-06 RX ADMIN — BUPIVACAINE HYDROCHLORIDE AND EPINEPHRINE 25 ML: 2.5; 5 INJECTION, SOLUTION EPIDURAL; INFILTRATION; INTRACAUDAL; PERINEURAL at 09:28

## 2022-10-06 RX ADMIN — PHENYLEPHRINE HYDROCHLORIDE 100 MCG: 10 INJECTION INTRAVENOUS at 10:37

## 2022-10-06 RX ADMIN — FENTANYL CITRATE 50 MCG: 50 INJECTION, SOLUTION INTRAMUSCULAR; INTRAVENOUS at 10:20

## 2022-10-06 RX ADMIN — PROPOFOL 200 MG: 10 INJECTION, EMULSION INTRAVENOUS at 10:24

## 2022-10-06 RX ADMIN — SODIUM CHLORIDE, PRESERVATIVE FREE 10 ML: 5 INJECTION INTRAVENOUS at 20:33

## 2022-10-06 RX ADMIN — PHENYLEPHRINE HYDROCHLORIDE 100 MCG: 10 INJECTION INTRAVENOUS at 11:31

## 2022-10-06 RX ADMIN — PHENYLEPHRINE HYDROCHLORIDE 100 MCG: 10 INJECTION INTRAVENOUS at 10:31

## 2022-10-06 RX ADMIN — ROCURONIUM BROMIDE 10 MG: 50 INJECTION, SOLUTION INTRAVENOUS at 12:31

## 2022-10-06 RX ADMIN — PHENYLEPHRINE HYDROCHLORIDE 20 MCG/MIN: 10 INJECTION INTRAVENOUS at 10:42

## 2022-10-06 RX ADMIN — KETAMINE HYDROCHLORIDE 15 MG: 50 INJECTION, SOLUTION INTRAMUSCULAR; INTRAVENOUS at 12:34

## 2022-10-06 RX ADMIN — NALOXEGOL OXALATE 25 MG: 25 TABLET, FILM COATED ORAL at 20:33

## 2022-10-06 RX ADMIN — ROCURONIUM BROMIDE 50 MG: 50 INJECTION, SOLUTION INTRAVENOUS at 10:24

## 2022-10-06 RX ADMIN — FENTANYL CITRATE 25 MCG: 50 INJECTION, SOLUTION INTRAMUSCULAR; INTRAVENOUS at 10:30

## 2022-10-06 RX ADMIN — ACETAMINOPHEN 1000 MG: 500 TABLET, FILM COATED ORAL at 08:45

## 2022-10-06 RX ADMIN — PHENYLEPHRINE HYDROCHLORIDE 100 MCG: 10 INJECTION INTRAVENOUS at 11:49

## 2022-10-06 RX ADMIN — LIDOCAINE HYDROCHLORIDE 100 MG: 20 INJECTION, SOLUTION EPIDURAL; INFILTRATION; INTRACAUDAL; PERINEURAL at 10:24

## 2022-10-06 RX ADMIN — PHENYLEPHRINE HYDROCHLORIDE 100 MCG: 10 INJECTION INTRAVENOUS at 13:01

## 2022-10-06 RX ADMIN — SODIUM CHLORIDE, POTASSIUM CHLORIDE, SODIUM LACTATE AND CALCIUM CHLORIDE: 600; 310; 30; 20 INJECTION, SOLUTION INTRAVENOUS at 20:15

## 2022-10-06 RX ADMIN — PHENYLEPHRINE HYDROCHLORIDE 100 MCG: 10 INJECTION INTRAVENOUS at 12:07

## 2022-10-06 RX ADMIN — Medication 2000 MG: at 10:36

## 2022-10-06 RX ADMIN — Medication 5 MG: at 13:11

## 2022-10-06 RX ADMIN — PHENYLEPHRINE HYDROCHLORIDE 100 MCG: 10 INJECTION INTRAVENOUS at 10:44

## 2022-10-06 RX ADMIN — SODIUM CHLORIDE, SODIUM LACTATE, POTASSIUM CHLORIDE, AND CALCIUM CHLORIDE: 600; 310; 30; 20 INJECTION, SOLUTION INTRAVENOUS at 10:30

## 2022-10-06 RX ADMIN — APREPITANT 40 MG: 40 CAPSULE ORAL at 08:46

## 2022-10-06 RX ADMIN — SUGAMMADEX 200 MG: 100 INJECTION, SOLUTION INTRAVENOUS at 13:22

## 2022-10-06 RX ADMIN — PHENYLEPHRINE HYDROCHLORIDE 100 MCG: 10 INJECTION INTRAVENOUS at 11:21

## 2022-10-06 RX ADMIN — MIDAZOLAM 2 MG: 1 INJECTION INTRAMUSCULAR; INTRAVENOUS at 09:15

## 2022-10-06 RX ADMIN — ACETAMINOPHEN 1000 MG: 500 TABLET, FILM COATED ORAL at 20:33

## 2022-10-06 RX ADMIN — GLYCOPYRROLATE 0.8 MG: 0.2 INJECTION, SOLUTION INTRAMUSCULAR; INTRAVENOUS at 13:11

## 2022-10-06 RX ADMIN — DEXAMETHASONE SODIUM PHOSPHATE 10 MG: 10 INJECTION INTRAMUSCULAR; INTRAVENOUS at 11:02

## 2022-10-06 RX ADMIN — FENTANYL CITRATE 25 MCG: 50 INJECTION, SOLUTION INTRAMUSCULAR; INTRAVENOUS at 12:34

## 2022-10-06 RX ADMIN — PHENYLEPHRINE HYDROCHLORIDE 100 MCG: 10 INJECTION INTRAVENOUS at 11:25

## 2022-10-06 ASSESSMENT — PAIN - FUNCTIONAL ASSESSMENT
PAIN_FUNCTIONAL_ASSESSMENT: 0-10
PAIN_FUNCTIONAL_ASSESSMENT: NONE - DENIES PAIN
PAIN_FUNCTIONAL_ASSESSMENT: NONE - DENIES PAIN

## 2022-10-06 ASSESSMENT — PAIN SCALES - GENERAL: PAINLEVEL_OUTOF10: 0

## 2022-10-06 NOTE — ANESTHESIA PROCEDURE NOTES
Airway  Date/Time: 10/6/2022 10:26 AM  Urgency: elective    Airway not difficult    General Information and Staff    Patient location during procedure: OR  Performed: resident/CRNA     Indications and Patient Condition  Indications for airway management: anesthesia  Spontaneous Ventilation: absent  Sedation level: deep  Preoxygenated: yes  Patient position: sniffing  MILS not maintained throughout  Mask difficulty assessment: vent by bag mask + OA or adjuvant +/- NMBA    Final Airway Details  Final airway type: endotracheal airway      Successful airway: ETT  Cuffed: yes   Successful intubation technique: direct laryngoscopy  Facilitating devices/methods: intubating stylet  Endotracheal tube insertion site: oral  Blade: Maribell  Blade size: #4  ETT size (mm): 8.0  Cormack-Lehane Classification: grade IIa - partial view of glottis  Placement verified by: chest auscultation and capnometry   Measured from: lips  ETT to lips (cm): 24  Number of attempts at approach: 1  Ventilation between attempts: bag mask  Number of other approaches attempted: 0    no

## 2022-10-06 NOTE — PERIOP NOTE
TRANSFER - OUT REPORT:    Verbal report given to Flako Villasenor RN on Cele Mar  being transferred to [address] [place] [address] for routine post-op       Report consisted of patients Situation, Background, Assessment and   Recommendations(SBAR). Information from the following report(s) Nurse Handoff Report, Adult Overview, Surgery Report, Intake/Output, and MAR was reviewed with the receiving nurse. Lines:   Peripheral IV 10/06/22 Left Hand (Active)   Site Assessment Clean, dry & intact 10/06/22 1430   Line Status Infusing 10/06/22 1430   Line Care Connections checked and tightened 10/06/22 1430   Phlebitis Assessment No symptoms 10/06/22 1430   Infiltration Assessment 0 10/06/22 1430   Alcohol Cap Used No 10/06/22 1430   Dressing Status Clean, dry & intact 10/06/22 1430   Dressing Type Transparent 10/06/22 1430       Peripheral IV 10/06/22 Right Hand (Active)   Site Assessment Clean, dry & intact 10/06/22 1430   Line Status Infusing 10/06/22 1430   Line Care Connections checked and tightened 10/06/22 1430   Phlebitis Assessment No symptoms 10/06/22 1430   Infiltration Assessment 0 10/06/22 1430   Alcohol Cap Used No 10/06/22 1430   Dressing Status Clean, dry & intact 10/06/22 1430   Dressing Type Transparent 10/06/22 1430        Opportunity for questions and clarification was provided. Patient transported with:   O2 @ 4 liters    VTE prophylaxis orders have been written for Cele Mar. Patient and family given floor number and nurses name. Family updated re: pt status after security code verified.

## 2022-10-06 NOTE — ANESTHESIA PROCEDURE NOTES
Peripheral Block    Patient location during procedure: pre-op  Reason for block: post-op pain management and at surgeon's request  Start time: 10/6/2022 9:15 AM  End time: 10/6/2022 9:23 AM  Staffing  Anesthesiologist: Lety Randolph MD  Preanesthetic Checklist  Completed: patient identified, IV checked, site marked, risks and benefits discussed, surgical/procedural consents, equipment checked, pre-op evaluation, timeout performed, anesthesia consent given, oxygen available and monitors applied/VS acknowledged  Peripheral Block   Patient position: prone  Prep: ChloraPrep  Provider prep: mask and sterile gloves  Patient monitoring: cardiac monitor, continuous pulse ox, frequent blood pressure checks, IV access, oxygen and responsive to questions  Block type: Erector spinae  Laterality: left  Injection technique: single-shot  Guidance: ultrasound guided    Needle   Needle type: insulated echogenic nerve stimulator needle   Needle gauge: 22 G  Needle localization: anatomical landmarks and ultrasound guidance  Test dose: negative  Needle length: 4.   Assessment   Injection assessment: negative aspiration for heme, no paresthesia on injection and local visualized surrounding nerve on ultrasound  Slow fractionated injection: yes  Hemodynamics: stable  Real-time US image taken/store: yes  Outcomes: uncomplicated    Medications Administered  bupivacaine-EPINEPHrine PF 0.25% -[id number] - Perineural   23 mL - 10/6/2022 9:23:00 AM

## 2022-10-06 NOTE — ANESTHESIA PRE PROCEDURE
0.9 % injection 5-40 mL  5-40 mL IntraVENous PRN Layne Naik MD        0.9 % sodium chloride infusion   IntraVENous PRN Layne Naik MD        aprepitant (EMEND) capsule 80 mg  80 mg Oral Once Melania Trotter MD           Allergies:  No Known Allergies    Problem List:    Patient Active Problem List   Diagnosis Code    Elevated PSA R97.20    Prostate cancer [facility]) C61       Past Medical History:        Diagnosis Date    Bell's palsy 06/2019    Chronic back pain     Elevated PSA May 2022    Hypertension     medication    Personal history of COVID-19 2020    was hospitalized 1 week    Prostate CA ([facility] [address] 09/2022       Past Surgical History:        Procedure Laterality Date    BACK SURGERY      OTHER SURGICAL HISTORY      surgery for broken nose    PROSTATE SURGERY N/A 08/04/2022    MRI FUSION GUIDED BIOPSY performed by Layne Naik MD at [facility] MAIN OR       Social History:    Social History     Tobacco Use    Smoking status: Never    Smokeless tobacco: Never   Substance Use Topics    Alcohol use: Never                                Counseling given: Not Answered      Vital Signs (Current):   Vitals:    09/29/22 1009 10/06/22 0819   BP: 138/85 134/89   Pulse: 66 92   Resp: 16 16   Temp: 96.9 °F (36.1 °C) 98.3 °F (36.8 °C)   TempSrc: Tympanic Oral   SpO2: 97% 97%   Weight: 228 lb 3.2 oz (103.5 kg) 224 lb 3.2 oz (101.7 kg)   Height: 5' 10.5\" (1.791 m) 5' 10\" (1.778 m)                                              BP Readings from Last 3 Encounters:   10/06/22 134/89   09/29/22 138/85   08/04/22 111/67       NPO Status: Time of last liquid consumption: 0000                        Time of last solid consumption: 0000                        Date of last liquid consumption: 10/05/22                        Date of last solid food consumption: 10/05/22    BMI:   Wt Readings from Last 3 Encounters:   10/06/22 224 lb 3.2 oz (101.7 kg)   09/29/22 228 lb 3.2 oz (103.5 kg)   08/04/22 224 lb (101.6 kg)     Body mass index is 32.17 kg/m². CBC:   Lab Results   Component Value Date/Time    WBC 5.9 09/29/2022 10:33 AM    RBC 5.39 09/29/2022 10:33 AM    HGB 15.8 09/29/2022 10:33 AM    HCT 48.6 09/29/2022 10:33 AM    MCV 90.2 09/29/2022 10:33 AM    RDW 13.2 09/29/2022 10:33 AM     09/29/2022 10:33 AM       CMP:   Lab Results   Component Value Date/Time     04/27/2022 09:56 AM    K 5.1 04/27/2022 09:56 AM     04/27/2022 09:56 AM    CO2 25 04/27/2022 09:56 AM    BUN 16 04/27/2022 09:56 AM    CREATININE 1.05 04/27/2022 09:56 AM    GFRAA 116 01/06/2021 09:43 AM    AGRATIO 1.7 04/27/2022 09:56 AM    GLUCOSE 92 04/27/2022 09:56 AM    PROT 7.1 04/27/2022 09:56 AM    CALCIUM 9.4 04/27/2022 09:56 AM    BILITOT 0.7 04/27/2022 09:56 AM    ALKPHOS 69 04/27/2022 09:56 AM    AST 24 04/27/2022 09:56 AM    ALT 36 04/27/2022 09:56 AM       POC Tests: No results for input(s): POCGLU, POCNA, POCK, POCCL, POCBUN, POCHEMO, POCHCT in the last 72 hours.     Coags: No results found for: PROTIME, INR, APTT    HCG (If Applicable): No results found for: PREGTESTUR, PREGSERUM, HCG, HCGQUANT     ABGs: No results found for: PHART, PO2ART, IMV0DMD, KTE0MFK, BEART, C3GSJENS     Type & Screen (If Applicable):  No results found for: LABABO, LABRH    Drug/Infectious Status (If Applicable):  No results found for: HIV, HEPCAB    COVID-19 Screening (If Applicable):   Lab Results   Component Value Date/Time    COVID19 Detected 12/22/2020 12:00 AM           Anesthesia Evaluation  Patient summary reviewed and Nursing notes reviewed  Airway: Mallampati: II  TM distance: >3 FB   Neck ROM: full     Dental:          Pulmonary:Negative Pulmonary ROS and normal exam                               Cardiovascular:  Exercise tolerance: good (>4 METS),   (+) hypertension:,         Rhythm: regular  Rate: normal                    Neuro/Psych:   Negative Neuro/Psych ROS              GI/Hepatic/Renal: Neg GI/Hepatic/Renal ROS            Endo/Other: Negative Endo/Other ROS             Pt had no PAT visit       Abdominal:             Vascular: negative vascular ROS. Other Findings:           Anesthesia Plan      general     ASA 2       Induction: intravenous. MIPS: Postoperative opioids intended and Prophylactic antiemetics administered. Anesthetic plan and risks discussed with patient.       Plan discussed with surgical team.          Post-op pain plan if not by surgeon: single peripheral nerve block            Aranza Viramontes MD   10/6/2022

## 2022-10-06 NOTE — PERIOP NOTE
Lidocaine drip pump settings confirmed at Ideal body weight: 73 kg (160 lb 15 oz) . Infusing at 18.3 ml/hour.

## 2022-10-06 NOTE — BRIEF OP NOTE
Brief Postoperative Note      Patient: Tico Begum  YOB: 1960  MRN: [id number]    Date of Procedure: 10/6/2022    Pre-Op Diagnosis: Prostate cancer ([facility] [address] Enedelia Mason    Post-Op Diagnosis: Same       Procedure(s):  PROSTATECTOMY RADICAL RETROPUBIC, BILATERAL LYMPHADENECTOMY ERAS    Surgeon(s):  MD Joanna Stephenson MD    Assistant:  * No surgical staff found *    Anesthesia: General    Estimated Blood Loss (mL): 346    Complications: None    Specimens:   ID Type Source Tests Collected by Time Destination   A : Right pelvic lymph node Tissue Lymph Node SURGICAL PATHOLOGY Marti Marquez MD 10/6/2022 1111    B : Left pelvic lymph node Tissue Lymph Node SURGICAL PATHOLOGY Marti Marquez MD 10/6/2022 1121    C : Apical prostate tissue Tissue Prostate SURGICAL PATHOLOGY Marti Marquez MD 10/6/2022 1153    D : Prostate and semical vesicles  Tissue Prostate SURGICAL PATHOLOGY Marti Marquez MD 10/6/2022 1258        Implants:  * No implants in log *      Drains:   Urinary Catheter 10/06/22 Daley (Active)       [REMOVED] Urinary Catheter 10/06/22 Daley (Removed)       Findings: see op note    Electronically signed by Daniela Ragsdale MD on 10/6/2022 at 1:27 PM

## 2022-10-06 NOTE — ANESTHESIA POSTPROCEDURE EVALUATION
Department of Anesthesiology  Postprocedure Note    Patient: Tico Begum  MRN: [id number]  YOB: 1960  Date of evaluation: 10/6/2022      Procedure Summary     Date: 10/06/22 Room / Location: [facility] MAIN OR 03 / [facility] MAIN OR    Anesthesia Start: 0375 Anesthesia Stop: 6037    Procedure: PROSTATECTOMY RADICAL RETROPUBIC, BILATERAL LYMPHADENECTOMY ERAS (Abdomen) Diagnosis:       Prostate cancer ([facility] [address]      (Prostate cancer ([facility] [address] Enedelia Mason)    Providers: Marti Marquez MD Responsible Provider: Ly Gardner MD    Anesthesia Type: general ASA Status: 2          Anesthesia Type: No value filed.     Leatha Phase I: Leatha Score: 8    Leatha Phase II:        Anesthesia Post Evaluation    Patient location during evaluation: PACU  Patient participation: complete - patient participated  Level of consciousness: awake and alert  Airway patency: patent  Nausea & Vomiting: no nausea  Cardiovascular status: hemodynamically stable  Respiratory status: acceptable  Hydration status: euvolemic

## 2022-10-06 NOTE — ANESTHESIA PROCEDURE NOTES
Peripheral Block    Patient location during procedure: pre-op  Reason for block: post-op pain management and at surgeon's request  Start time: 10/6/2022 9:23 AM  End time: 10/6/2022 9:28 AM  Staffing  Anesthesiologist: Galo Clark MD  Preanesthetic Checklist  Completed: patient identified, IV checked, site marked, risks and benefits discussed, surgical/procedural consents, equipment checked, pre-op evaluation, timeout performed, anesthesia consent given, oxygen available and monitors applied/VS acknowledged  Peripheral Block   Patient position: prone  Prep: ChloraPrep  Provider prep: mask and sterile gloves  Patient monitoring: cardiac monitor, continuous pulse ox, frequent blood pressure checks, IV access, oxygen and responsive to questions  Block type: Erector spinae  Laterality: right  Injection technique: single-shot  Guidance: ultrasound guided    Needle   Needle type: insulated echogenic nerve stimulator needle   Needle gauge: 22 G  Needle localization: anatomical landmarks and ultrasound guidance  Test dose: negative  Needle length: 4.   Assessment   Injection assessment: negative aspiration for heme, no paresthesia on injection and local visualized surrounding nerve on ultrasound  Slow fractionated injection: yes  Hemodynamics: stable  Real-time US image taken/store: yes  Outcomes: uncomplicated    Medications Administered  bupivacaine-EPINEPHrine PF 0.25% -[id number] - Perineural   25 mL - 10/6/2022 9:28:00 AM

## 2022-10-06 NOTE — H&P
Chago Ramirez  : 1960          Chief Complaint   Patient presents with    H&P       Having surgery thursday         HPI      Chago Ramirez is a 63 y.o. male Referred by Dr. Gonzalo Lovelace for evaluation and treatment of elevated PSA. PSA 7.5 in . No previous values noted. No voiding problems. No family hx of prostate cancer. No weight loss or bone pain . GARFIELD showed no nodules. Repeat PSA 8.5 in . MRI 22:   FINDINGS:   Last PSA: 7.5   Prostate Size: 4.0 x 4.2 x 2.7 cm with a calculated volume of  23.5 mL. PSA Density: 0.32 ng/mL       BPH: No significant BPH. Hemorrhage: None. Peripheral Zone: There is areas of T2 hyperintensity throughout both peripheral   zones. No suspicious lesions are described below. Transition/Central Zone: No suspicious transition zone lesion. Lesion # 1   -Size and location: A 1.5 x 1.3 x 1.2 cm T2 hypointense area within the right   peripheral zone of the gland base (series 4 image 15 and series 6 image 16). -DWI/ADC: Diffusion restriction. -DCE: The lesion demonstrates enhancement.   -Prostate Margin: No evidence of extracapsular extension. -PI-RADS Category: 5       Lesion # 2   -Size and location: A 0.9 x 0.7 cm T2 hypointense area within the left   peripheral zone of the mid gland (series 4 image 16). -DWI/ADC: Mild diffusion restriction. -DCE: No appreciable asymmetric enhancement.   -Prostate Margin: No evidence of extracapsular extension. -PI-RADS Category: 3       Seminal Vesicles: The seminal vesicles are unremarkable. Lymph Nodes: No appreciable pelvic lymphadenopathy. Other: Bilateral fat-containing inguinal hernias. Impression   1. PI-RADS 5 lesion within the right peripheral zone of the gland base,   suspicious for malignancy. 2.  PI-RADS 3 lesion within the left peripheral zone of the mid gland. 3.  No evidence fracture capsular extension or pelvic lymphadenopathy.       Had MRI fusion prostate biopsy on 8-4-22: PROSTATE VOLUME:  32 gm PSA 8.5  PSAD 0.27  Path: DIAGNOSIS       A:  \"PROSTATE, REGION OF INTEREST #1 RIGHT BASE, BIOPSY\":               PROSTATIC ADENOCARCINOMA, BOB SCORE 4 + 3 = 7 (GRADE GROUP   3),                  INVOLVING <10% OF BIOPSY MATERIAL. B:  \"PROSTATE, REGION OF INTEREST #2 LEFT APEX, BIOPSY\":               FOCUS OF ATYPICAL GLANDS SUSPICIOUS FOR ADENOCARCINOMA. C:  \"PROSTATE, LEFT LATERAL BASE, BIOPSY\":               BENIGN PROSTATE TISSUE. D:  \"PROSTATE, LEFT LATERAL MID, BIOPSY\":               BENIGN PROSTATE TISSUE. E:  \"PROSTATE, LEFT LATERAL APEX, BIOPSY\":               BENIGN PROSTATE TISSUE. F:  \"PROSTATE, LEFT BASE, BIOPSY\":               BENIGN PROSTATE TISSUE. G:  \"PROSTATE, LEFT MID, BIOPSY\":               BENIGN PROSTATE TISSUE. H:  \"PROSTATE, LEFT APEX, BIOPSY\":               PROSTATIC ADENOCARCINOMA, BOB SCORE 3 + 3 = 6 (GRADE GROUP   1),                     INVOLVING LESS THAN 5% OF 1 CORE. I:  \"PROSTATE, RIGHT LATERAL BASE, BIOPSY\":               PROSTATIC ADENOCARCINOMA, BOB SCORE 4 + 3 = 7 (GRADE GROUP   3),                  INVOLVING 20% OF BIOPSY MATERIAL.             J:  \"PROSTATE, RIGHT LATERAL MID, BIOPSY\":               PROSTATIC ADENOCARCINOMA, BOB SCORE 4 + 4 = 8 (GRADE GROUP   4),                  DISCONTINUOUSLY INVOLVING 70% OF 1 CORE.                       K:  \"PROSTATE, RIGHT LATERAL APEX, BIOPSY\":               PROSTATIC ADENOCARCINOMA, BOB SCORE 4 + 3 = 7 (GRADE GROUP   3),                  DISCONTINUOUSLY INVOLVING 30% OF 1 CORE.          L:  \"PROSTATE, RIGHT BASE, BIOPSY\":               PROSTATIC ADENOCARCINOMA, BOB SCORE 3 + 3 = 6 (GRADE GROUP   1),                  INVOLVING <5 % OF 1 CORE.          M:  \"PROSTATE, RIGHT MID, BIOPSY\":             PROSTATIC ADENOCARCINOMA, BOB SCORE 4 + 3 = 7 (GRADE GROUP   3), DISCONTINUOUSLY INVOLVING 50% OF 1 CORE.          N:  \"PROSTATE, RIGHT APEX, BIOPSY\":             PROSTATIC ADENOCARCINOMA, BOB SCORE 3 + 4 = 7 (GRADE GROUP   2;                  PERCENT PATTERN 4: <10%), DISCONTINUOUSLY INVOLVING 80% OF        1 CORE. Stage T 1c, Grade 4+4 =8 prostate cancer. Bone scan 8-31-22:   Normal whole body bone scan. No scintigraphic findings present to   suspect osseous metastatic disease. PSMA PET scan 9-2-22:   1. Prostate gland activity closely following findings by MRI with intense focal   activity in the right peripheral zone likely related to the PIRADS category 5   lesion at this level, and mild to moderate activity in the left peripheral zone   likely corresponding to the PIRADS category 3 lesion previously described. 2. No findings concerning for metastatic disease. Past Medical History:   Diagnosis Date    Bell's palsy 06/2019    Chronic back pain      Elevated PSA May 2022    Hypertension       medication    Personal history of COVID-19 2020     was hospitalized 1 week    Prostate CA ([facility] [address] 09/2022            Past Surgical History:   Procedure Laterality Date    BACK SURGERY        OTHER SURGICAL HISTORY         surgery for broken nose    PROSTATE SURGERY N/A 08/04/2022     MRI FUSION GUIDED BIOPSY performed by Parag Parekh MD at [facility] MAIN OR             Current Outpatient Medications   Medication Sig Dispense Refill    valsartan (DIOVAN) 80 MG tablet Take 80 mg by mouth daily          No current facility-administered medications for this visit.       No Known Allergies  Social History            Socioeconomic History    Marital status:        Spouse name: Not on file    Number of children: Not on file    Years of education: Not on file    Highest education level: Not on file   Occupational History    Not on file   Tobacco Use    Smoking status: Never    Smokeless tobacco: Never   Vaping Use    Vaping Use: Never used   Substance and Sexual Activity    Alcohol use: Never    Drug use: Never    Sexual activity: Not on file   Other Topics Concern    Not on file   Social History Narrative    Not on file      Social Determinants of Health      Financial Resource Strain: Not on file   Food Insecurity: Not on file   Transportation Needs: Not on file   Physical Activity: Not on file   Stress: Not on file   Social Connections: Not on file   Intimate Partner Violence: Not on file   Housing Stability: Not on file            Family History   Problem Relation Age of Onset    Hypertension Mother      Suicide Father      Cancer Child           testicular         ROS     There were no vitals taken for this visit. Physical Exam  General   Mental Status - Patient is alert and oriented X3. Build & Nutrition - Well nourished. Chest and Lung Exam   Chest and lung exam reveals  - normal excursion with symmetric chest walls, quiet, even and easy respiratory effort with no use of accessory muscles and on auscultation, normal breath sounds, no adventitious sounds and normal vocal resonance. Cardiovascular   Cardiovascular examination reveals  - normal heart sounds, regular rate and rhythm with no murmurs. Abdomen   Palpation/Percussion: Palpation and Percussion of the abdomen reveal - Non Tender, No Rebound tenderness, No Rigidity (guarding), No hepatosplenomegaly, No Palpable abdominal masses and Soft. Hernia - Bilateral - No Hernia(s) present.      Urinalysis  UA - Dipstick        Results for orders placed or performed in visit on 06/06/22   AMB POC URINALYSIS DIP STICK AUTO W/O MICRO   Result Value Ref Range     Color (UA POC) Anna       Clarity (UA POC) Clear       Glucose, Urine, POC Negative Negative     Bilirubin, Urine, POC Negative Negative     KETONES, Urine, POC Negative Negative     Specific Gravity, Urine, POC 1.015 1.001 - 1.035     Blood (UA POC) Trace-lysed Negative     pH, Urine, POC 7.0 4.6 - 8.0     Protein, Urine, POC Negative Negative     Urobilinogen, POC Normal       Nitrite, Urine, POC Negative Negative     Leukocyte Esterase, Urine, POC Negative Negative         UA - Micro  WBC - 0  RBC - 0  Bacteria - 0  Epith - 0     Physical Exam     Assessment and Plan     Jean Marie Mane was seen today for h&p. Diagnoses and all orders for this visit:     Elevated PSA  -     AMB POC URINALYSIS DIP STICK MANUAL W/O MICRO     Prostate cancer ([facility] [address]     Stage T 1c, Grade 4+4 =8 prostate cancer. Pathology results from the biopsy and all available treatment options were reviewed in detail with the patient today. Treatments options discussed but not limited to included surgery (open, perineal and robotic assisted laparoscopic approaches), external beam radiation, brachytherapy, proton beam therapy, cryosurgery, HIFU, androgen deprivation therapy and watchful waiting including active surveillance. I discussed all risks, possible side effects and benefits associated with the above treatment options. I specifically discussed the 30-40% risk of permanent ED in men without preoperative ED, 100% risk of ED in men with preoperative ED, 10-15% risk of permanent incontinence requiring pads, and 2-3% risk of severe incontinence in patients undergoing open or robotic radical prostatectomy. He is potent. No LUTS. Plan radical retropubic prostatectomy, bilateral pelvic LND. Staging studies negative for mets. Plan bilateral nerve-sparing.

## 2022-10-06 NOTE — OP NOTE
[facility]  OPERATIVE REPORT    Name:  Geraldo Graham  MR#:  [id number]  :  1960  ACCOUNT #:  [de-identified]  DATE OF SERVICE:  10/06/2022    PREOPERATIVE DIAGNOSIS:  Prostate cancer. POSTOPERATIVE DIAGNOSIS:  Prostate cancer. PROCEDURE PERFORMED:  Bilateral pelvic lymphadenectomy and radical retropubic prostatectomy. SURGEON:  Deisy Woods MD    ASSISTANT:  Esther Yanes MD    ANESTHESIA:  General.    COMPLICATIONS:  None. SPECIMENS REMOVED:  Prostate, seminal vesicles. IMPLANTS:  None. ESTIMATED BLOOD LOSS:  600 mL. FINDINGS:  Grossly normal prostate. INDICATIONS:  Patient with stage T1c grade 4+4=8 prostate cancer. PROCEDURE:  The patient was given a general anesthetic, placed in the supine position. The anesthesia team did a TAP block. The lower abdomen was shaved, prepped and draped in sterile fashion. A 20-Romanian Daley catheter was placed and the balloon was inflated with 20 mL of water. We made a lower midline incision between the pubis and the umbilicus. The subcutaneous fat was divided with the Bovie. We identified the rectus fascia in the midline. This was opened giving access to the space of Retzius. A Wiota retractor was placed with the malleable notched blade around the catheter balloon pulling the catheter in a cephalad direction. We performed right and then left obturator lymph node dissections. The jason tissue between the external iliac vein and the obturator nerve was removed on each side. This appeared to be grossly normal fatty/lymphatic tissue. We then turned attention to the prostate. The endopelvic fascia was exposed. Some of the fatty tissue at the apex was divided with the LigaSure [place] device. The endopelvic fascia was opened on either side of prostate. The puboprostatic ligaments were not divided. We developed a plane between the dorsal venous complex and the urethra.   I passed a Katie clamp from left to right and a 0 Vicryl tie with a needle attach was passed. This was tied down as a stick tie to the dorsal venous complex. A separate tie was also placed with good hemostasis. We used a [place] clamp together with the plexus of Santorini on the anterior cervical prostate and a stick tie of 0 Vicryl was placed through that to prevent back bleeding. The Katie clamp was then repassed in the plane between the dorsal venous complex and the urethra. The dorsal venous complex was divided with the [place] device. There was some bleeding from the stump which was controlled with a single figure-of-eight suture of 0 Vicryl. The prostate was visualized. We  the neurovascular bundles on each side from the prostatic capsule. There was no palpable nodule noted. I was then able to surround the urethra with a right angle clamp. It should be noted that the dissection of the left apex resulted in a small sliver of tissue being  from the prostate. This was suspicious for prostatic tissue and was sent as a separate specimen labeled \"apical tissue. \"    The anterior two-thirds of the urethra was divided using a 15-blade at its junction with the prostate. Preplaced anastomotic sutures of double-armed 2-0 Monocryl were placed with UR-6 needle going through the urethral stump. We placed a total of five anteriorly. The catheter was then clamped, cut and pulled cephalad giving access to the posterior wall of the urethra. Three more sutures were placed from in to out layer and were gathered up on hemostats and placed on an [place] clamp for later anastomosis. We then divided the posterior urethra  it from the prostate. The rectourethralis fibers were visualized and were divided with the harmonic device. We divided the pedicles bilaterally in a similar fashion. I was able to readily identify Denonvilliers fascia and opened this.   The seminal vesicles were dissected free using harmonic

## 2022-10-07 LAB
ANION GAP SERPL CALC-SCNC: 6 MMOL/L (ref 4–13)
BUN SERPL-MCNC: 17 MG/DL (ref 8–23)
CALCIUM SERPL-MCNC: 8 MG/DL (ref 8.3–10.4)
CHLORIDE SERPL-SCNC: 105 MMOL/L (ref 101–110)
CO2 SERPL-SCNC: 26 MMOL/L (ref 21–32)
CREAT SERPL-MCNC: 1.2 MG/DL (ref 0.8–1.5)
ERYTHROCYTE [DISTWIDTH] IN BLOOD BY AUTOMATED COUNT: 13.4 % (ref 11.9–14.6)
GLUCOSE SERPL-MCNC: 132 MG/DL (ref 65–100)
HCT VFR BLD AUTO: 43.1 % (ref 41.1–50.3)
HGB BLD-MCNC: 14.1 G/DL (ref 13.6–17.2)
MAGNESIUM SERPL-MCNC: 2.2 MG/DL (ref 1.8–2.4)
MCH RBC QN AUTO: 30.3 PG (ref 26.1–32.9)
MCHC RBC AUTO-ENTMCNC: 32.7 G/DL (ref 31.4–35)
MCV RBC AUTO: 92.5 FL (ref 79.6–97.8)
NRBC # BLD: 0 K/UL (ref 0–0.2)
PHOSPHATE SERPL-MCNC: 3.1 MG/DL (ref 2.3–3.7)
PLATELET # BLD AUTO: 298 K/UL (ref 150–450)
PMV BLD AUTO: 9.4 FL (ref 9.4–12.3)
POTASSIUM SERPL-SCNC: 4.6 MMOL/L (ref 3.5–5.1)
RBC # BLD AUTO: 4.66 M/UL (ref 4.23–5.6)
SODIUM SERPL-SCNC: 137 MMOL/L (ref 138–145)
WBC # BLD AUTO: 14.5 K/UL (ref 4.3–11.1)

## 2022-10-07 PROCEDURE — 99024 POSTOP FOLLOW-UP VISIT: CPT | Performed by: NURSE PRACTITIONER

## 2022-10-07 PROCEDURE — 36415 COLL VENOUS BLD VENIPUNCTURE: CPT

## 2022-10-07 PROCEDURE — 80048 BASIC METABOLIC PNL TOTAL CA: CPT

## 2022-10-07 PROCEDURE — 85027 COMPLETE CBC AUTOMATED: CPT

## 2022-10-07 PROCEDURE — 2580000003 HC RX 258: Performed by: UROLOGY

## 2022-10-07 PROCEDURE — 6360000002 HC RX W HCPCS: Performed by: UROLOGY

## 2022-10-07 PROCEDURE — 1100000000 HC RM PRIVATE

## 2022-10-07 PROCEDURE — 6370000000 HC RX 637 (ALT 250 FOR IP): Performed by: UROLOGY

## 2022-10-07 PROCEDURE — 83735 ASSAY OF MAGNESIUM: CPT

## 2022-10-07 PROCEDURE — 84100 ASSAY OF PHOSPHORUS: CPT

## 2022-10-07 RX ADMIN — SODIUM CHLORIDE, PRESERVATIVE FREE 10 ML: 5 INJECTION INTRAVENOUS at 08:59

## 2022-10-07 RX ADMIN — NALOXEGOL OXALATE 25 MG: 25 TABLET, FILM COATED ORAL at 08:57

## 2022-10-07 RX ADMIN — ACETAMINOPHEN 1000 MG: 500 TABLET, FILM COATED ORAL at 14:51

## 2022-10-07 RX ADMIN — SODIUM CHLORIDE, PRESERVATIVE FREE 10 ML: 5 INJECTION INTRAVENOUS at 22:26

## 2022-10-07 RX ADMIN — SODIUM CHLORIDE, POTASSIUM CHLORIDE, SODIUM LACTATE AND CALCIUM CHLORIDE: 600; 310; 30; 20 INJECTION, SOLUTION INTRAVENOUS at 09:32

## 2022-10-07 RX ADMIN — ACETAMINOPHEN 1000 MG: 500 TABLET, FILM COATED ORAL at 02:38

## 2022-10-07 RX ADMIN — KETOROLAC TROMETHAMINE 30 MG: 30 INJECTION, SOLUTION INTRAMUSCULAR at 08:59

## 2022-10-07 RX ADMIN — VALSARTAN 80 MG: 160 TABLET, FILM COATED ORAL at 08:57

## 2022-10-07 RX ADMIN — KETOROLAC TROMETHAMINE 30 MG: 30 INJECTION, SOLUTION INTRAMUSCULAR at 02:38

## 2022-10-07 RX ADMIN — KETOROLAC TROMETHAMINE 30 MG: 30 INJECTION, SOLUTION INTRAMUSCULAR at 14:51

## 2022-10-07 RX ADMIN — KETOROLAC TROMETHAMINE 30 MG: 30 INJECTION, SOLUTION INTRAMUSCULAR at 22:25

## 2022-10-07 RX ADMIN — ACETAMINOPHEN 1000 MG: 500 TABLET, FILM COATED ORAL at 08:57

## 2022-10-07 RX ADMIN — ACETAMINOPHEN 1000 MG: 500 TABLET, FILM COATED ORAL at 22:25

## 2022-10-07 ASSESSMENT — PAIN SCALES - GENERAL
PAINLEVEL_OUTOF10: 0

## 2022-10-07 NOTE — CARE COORDINATION
Chart reviewed by . Patient is a 63year old male, admitted for Prostate Cancer. CM met with patient to discuss discharge planning. Patient presented alert and oriented x4. Demographics verified. Patient lives with his spouse. At baseline, patient is independent with completing ADL's and drives. Patient denies any DME use. PCP and insurance verified. Patient is able to afford prescription medications. Discharge planning: Patient to discharge to his mother's home when medically cleared. Family to assist with care needs. Patient to alert CM, if [facility] therapy is needed prior to discharge. No additional discharge needs noted at this time. CM following. 10/07/22 5061   Service Assessment   Patient Orientation Alert and Oriented;Place;Person;Situation   Cognition Alert   History Provided By Patient   Primary Caregiver Self   Accompanied By/Relationship Family   Support Systems Spouse/Significant Other;Parent; Family Members   Patient's [address] is: Legal Next of Salvatore 71   PCP Verified by CM Yes   Last Visit to PCP Within last 6 months   Prior Functional Level Independent in ADLs/IADLs   Can patient return to prior living arrangement Yes   Ability to make needs known: Good   Family able to assist with home care needs: Yes   Social/Functional History   [facility]   Home Equipment   (Denies DME use.)   ADL Assistance Independent   Ambulation Assistance Independent   Active  Yes   Mode of Transportation Car   Occupation Part time employment   Type of Occupation [employer]. Discharge Planning   Type of Residence House   Living Arrangements Spouse/Significant Other   Current Services Prior To Admission None   Potential [address] Medications No   Patient expects to be discharged to: [address] Discharge   Transition of Care Consult (CM Consult) Discharge [address] [place] [address] Avenue Provided?  No Mode of Transport at Discharge Other (see comment)  (Family.)   Confirm Follow Up Transport Self   Condition of Participation: Discharge Planning   The Plan for Transition of Care is related to the following treatment goals: Return to baseline.

## 2022-10-07 NOTE — PROGRESS NOTES
Pt had drainage noted at the lower abd surgical site. New dressing applied by NP. Pt denies and pain or discomfort unless he is moving. IV fluids infusing. Will continue to monitor and provide are.

## 2022-10-07 NOTE — OP NOTE
[address] [facility]  OPERATIVE REPORT    Name:  Cassidy Lagunas  MR#:  [id number]  :  1960  ACCOUNT #:  [de-identified]  DATE OF SERVICE:  10/06/2022    PREOPERATIVE DIAGNOSIS:  Prostate cancer. POSTOPERATIVE DIAGNOSIS:  Prostate cancer. PROCEDURE PERFORMED:  Open radical retropubic prostatectomy; bilateral lymphadenectomy, pelvic. SURGEON:  Derek Serrano MD    ASSISTANT:  Monty Fuentes MD    ANESTHESIA:  General.    COMPLICATIONS:  None. SPECIMENS REMOVED:  Right pelvic lymph node, left pelvic lymph node, apical prostate tissue, prostate and seminal vesicles. IMPLANTS:  None. ESTIMATED BLOOD LOSS:  500 mL. DRAINS:  ALEJANDRINA drain and Daley catheter. FINDINGS:  See op note. INDICATIONS FOR OPERATIVE PROCEDURE:  The patient is a 24-year-old gentleman with a history of high-risk prostate cancer who presents today for removal of his prostate and bilateral pelvic lymph node dissection. After risk-benefit discussion was had, he opted to proceed. DESCRIPTION OF OPERATIVE PROCEDURE:  After informed consent was obtained and the correct patient was identified in the preoperative holding area, he was taken back to operating suite and placed on table in supine position. Time-out was performed confirming correct patient and planned procedure. He received 2 g IV Ancef prior to smooth induction of general endotracheal anesthesia. He was moved in dorsal lithotomy position, prepped and draped in usual sterile fashion. We began the case by making an infraumbilical midline incision down to the pubic symphysis from the umbilicus using an 29-PGCPE scalpel. We then used a combination of blunt and sharp dissection to dissect down through the subcutaneous fat with the rectus muscle and incised the fascia to expose the space of Retzius and the bladder. I performed as the assistant throughout the case. I assisted Dr. Janey Gonzalez with the opening of the patient's abdomen.   I assisted him with identification of key landmarks throughout the case as well as performed retraction and visualization where necessary throughout the case. Dr. Fernandez Paulson performed the right pelvic lymph node dissection. I performed the left pelvic lymph node dissection. I then assisted Dr. Fernandez Paulson with the dissection and removal of the prostate and seminal vesicles. I assisted him with the removal of the prostate, development of the bladder neck and urethral anastomosis. I also assisted him with drain placement and closure of the patient's abdomen. Please see Dr. Galileo Arora operative note for further details regarding this operative procedure. I was present and scrubbed for the entire procedure.       Elroy Ramirez MD      [id number]  D:  10/06/2022 21:44  T:  10/07/2022 1:12  JOB #:  [id number]

## 2022-10-07 NOTE — PROGRESS NOTES
Pt ambulated in the cho without difficulty. ABD dressing changed due to bright red blood saturation. No visible drainage noted during dressing change. Pt denies any pain. Will continue to monitor and provide care.

## 2022-10-07 NOTE — PROGRESS NOTES
Admit Date: 10/6/2022      Subjective:     Oxana Rea is POD 1 PROSTATECTOMY RADICAL RETROPUBIC, BILATERAL LYMPHADENECTOMY. Pt tolerating clears, no nausea. Pain controlled. He ambulated today and started having oozing of blood from his incision. He is afebrile, VSS. Daley in place. Objective:     Patient Vitals for the past 8 hrs:   BP Temp Temp src Pulse Resp SpO2 Weight   10/07/22 1220 117/70 99.8 °F (37.7 °C) Oral 80 18 95 % --   10/07/22 0802 112/81 97.7 °F (36.5 °C) Oral 73 18 90 % --   10/07/22 0534 -- -- -- -- -- -- 231 lb 7.7 oz (105 kg)     10/07 0701 - 10/07 1900  In: -   Out: 515 [Urine:500; Drains:15]  10/05 1901 - 10/07 0700  In: 1200 [I.V.:1200]  Out: 7058 [Urine:2700; Drains:90]    Physical Exam:  GENERAL ASSESSMENT: alert, oriented to person, place and time, no acute distress   Chest: easy work of breathing.   CVS exam: normal rate, regular rhythm, normal S1, S2  ABDOMEN: soft, non tender, bleeding from incision site, active bowel sounds, ALEJANDRINA drain in place  Neurological exam reveals alert, oriented, normal speech    Data Review   Recent Results (from the past 24 hour(s))   Basic Metabolic Panel    Collection Time: 10/06/22  3:07 PM   Result Value Ref Range    Sodium 141 136 - 145 mmol/L    Potassium 4.0 3.5 - 5.1 mmol/L    Chloride 108 101 - 110 mmol/L    CO2 23 21 - 32 mmol/L    Anion Gap 10 4 - 13 mmol/L    Glucose 163 (H) 65 - 100 mg/dL    BUN 14 8 - 23 MG/DL    Creatinine 1.20 0.8 - 1.5 MG/DL    Est, Glom Filt Rate >60 >60 ml/min/1.73m2    Calcium 7.9 (L) 8.3 - 10.4 MG/DL   Hemoglobin and Hematocrit    Collection Time: 10/06/22  9:22 PM   Result Value Ref Range    Hemoglobin 14.4 13.6 - 17.2 g/dL    Hematocrit 43.9 41.1 - 50.3 %   Basic Metabolic Panel    Collection Time: 10/07/22  5:39 AM   Result Value Ref Range    Sodium 137 (L) 138 - 145 mmol/L    Potassium 4.6 3.5 - 5.1 mmol/L    Chloride 105 101 - 110 mmol/L    CO2 26 21 - 32 mmol/L    Anion Gap 6 4 - 13 mmol/L    Glucose 132 (H) 65 - 100 mg/dL    BUN 17 8 - 23 MG/DL    Creatinine 1.20 0.8 - 1.5 MG/DL    Est, Glom Filt Rate >60 >60 ml/min/1.73m2    Calcium 8.0 (L) 8.3 - 10.4 MG/DL   Magnesium    Collection Time: 10/07/22  5:39 AM   Result Value Ref Range    Magnesium 2.2 1.8 - 2.4 mg/dL   Phosphorus    Collection Time: 10/07/22  5:39 AM   Result Value Ref Range    Phosphorus 3.1 2.3 - 3.7 MG/DL   CBC    Collection Time: 10/07/22  5:39 AM   Result Value Ref Range    WBC 14.5 (H) 4.3 - 11.1 K/uL    RBC 4.66 4.23 - 5.6 M/uL    Hemoglobin 14.1 13.6 - 17.2 g/dL    Hematocrit 43.1 41.1 - 50.3 %    MCV 92.5 79.6 - 97.8 FL    MCH 30.3 26.1 - 32.9 PG    MCHC 32.7 31.4 - 35.0 g/dL    RDW 13.4 11.9 - 14.6 %    Platelets 859 624 - 108 K/uL    MPV 9.4 9.4 - 12.3 FL    nRBC 0.00 0.0 - 0.2 K/uL       Assessment:     Principal Problem:    Prostate cancer ([facility] [address]  Resolved Problems:    * No resolved hospital problems. *        Pre-Op Diagnosis: Prostate cancer ([facility] [address] [C61]      Procedures: Procedure(s):  PROSTATECTOMY RADICAL RETROPUBIC, BILATERAL LYMPHADENECTOMY ERAS      Plan:     Bleeding improved, site cleansed, quick clot dressing applied over staples and covered with gauze/ABD pad/tape. Monitor closely. Ambulate to chair. Continue clears, advance diet with flatus. Continue IS use q hour. Continue he catheter, teach he care. Continue ALEJANDRINA drain. Labs tomorrow AM.      Signed By: SHANAE Luna - CNP     October 7, 2022      [facility]  I have reviewed the above note and examined the patient. I agree with the exam, assessment and plan.     Leesa Aguayo MD

## 2022-10-07 NOTE — PROGRESS NOTES
Spoke with Dr Katharine Ding with Urology to let him know that while ambulating patient his incision started to bleed quite a bit. Dressing was reinforced and patient was put back to bed. No new orders received. Per Dr Katharine Ding please relay message to dr Lalita Collins on his rounds.

## 2022-10-07 NOTE — PROGRESS NOTES
Pt ambulated without difficulty. No new drainage noted to the abd dressing. Pain controlled. Will report to night shift nurse.

## 2022-10-08 LAB
ANION GAP SERPL CALC-SCNC: 1 MMOL/L (ref 4–13)
BUN SERPL-MCNC: 17 MG/DL (ref 8–23)
CALCIUM SERPL-MCNC: 7.7 MG/DL (ref 8.3–10.4)
CHLORIDE SERPL-SCNC: 109 MMOL/L (ref 101–110)
CO2 SERPL-SCNC: 28 MMOL/L (ref 21–32)
CREAT SERPL-MCNC: 1 MG/DL (ref 0.8–1.5)
ERYTHROCYTE [DISTWIDTH] IN BLOOD BY AUTOMATED COUNT: 14.1 % (ref 11.9–14.6)
GLUCOSE SERPL-MCNC: 97 MG/DL (ref 65–100)
HCT VFR BLD AUTO: 34.6 % (ref 41.1–50.3)
HGB BLD-MCNC: 11.4 G/DL (ref 13.6–17.2)
MCH RBC QN AUTO: 30.1 PG (ref 26.1–32.9)
MCHC RBC AUTO-ENTMCNC: 32.9 G/DL (ref 31.4–35)
MCV RBC AUTO: 91.3 FL (ref 79.6–97.8)
NRBC # BLD: 0 K/UL (ref 0–0.2)
PLATELET # BLD AUTO: 228 K/UL (ref 150–450)
PMV BLD AUTO: 9.1 FL (ref 9.4–12.3)
POTASSIUM SERPL-SCNC: 4 MMOL/L (ref 3.5–5.1)
RBC # BLD AUTO: 3.79 M/UL (ref 4.23–5.6)
SODIUM SERPL-SCNC: 138 MMOL/L (ref 136–145)
WBC # BLD AUTO: 10.3 K/UL (ref 4.3–11.1)

## 2022-10-08 PROCEDURE — 36415 COLL VENOUS BLD VENIPUNCTURE: CPT

## 2022-10-08 PROCEDURE — 80048 BASIC METABOLIC PNL TOTAL CA: CPT

## 2022-10-08 PROCEDURE — 2580000003 HC RX 258: Performed by: UROLOGY

## 2022-10-08 PROCEDURE — 6370000000 HC RX 637 (ALT 250 FOR IP): Performed by: UROLOGY

## 2022-10-08 PROCEDURE — 1100000000 HC RM PRIVATE

## 2022-10-08 PROCEDURE — 85027 COMPLETE CBC AUTOMATED: CPT

## 2022-10-08 PROCEDURE — 6360000002 HC RX W HCPCS: Performed by: UROLOGY

## 2022-10-08 RX ADMIN — ACETAMINOPHEN 1000 MG: 500 TABLET, FILM COATED ORAL at 13:40

## 2022-10-08 RX ADMIN — ACETAMINOPHEN 1000 MG: 500 TABLET, FILM COATED ORAL at 01:43

## 2022-10-08 RX ADMIN — ACETAMINOPHEN 1000 MG: 500 TABLET, FILM COATED ORAL at 08:35

## 2022-10-08 RX ADMIN — KETOROLAC TROMETHAMINE 30 MG: 30 INJECTION, SOLUTION INTRAMUSCULAR at 21:46

## 2022-10-08 RX ADMIN — KETOROLAC TROMETHAMINE 30 MG: 30 INJECTION, SOLUTION INTRAMUSCULAR at 01:44

## 2022-10-08 RX ADMIN — SODIUM CHLORIDE, PRESERVATIVE FREE 10 ML: 5 INJECTION INTRAVENOUS at 21:50

## 2022-10-08 RX ADMIN — SODIUM CHLORIDE, PRESERVATIVE FREE 10 ML: 5 INJECTION INTRAVENOUS at 08:36

## 2022-10-08 RX ADMIN — NALOXEGOL OXALATE 25 MG: 25 TABLET, FILM COATED ORAL at 08:36

## 2022-10-08 RX ADMIN — VALSARTAN 80 MG: 160 TABLET, FILM COATED ORAL at 08:34

## 2022-10-08 RX ADMIN — KETOROLAC TROMETHAMINE 30 MG: 30 INJECTION, SOLUTION INTRAMUSCULAR at 08:36

## 2022-10-08 RX ADMIN — KETOROLAC TROMETHAMINE 30 MG: 30 INJECTION, SOLUTION INTRAMUSCULAR at 13:41

## 2022-10-08 RX ADMIN — ACETAMINOPHEN 1000 MG: 500 TABLET, FILM COATED ORAL at 21:52

## 2022-10-08 ASSESSMENT — PAIN DESCRIPTION - LOCATION
LOCATION: GROIN
LOCATION: GROIN

## 2022-10-08 ASSESSMENT — PAIN SCALES - GENERAL
PAINLEVEL_OUTOF10: 1
PAINLEVEL_OUTOF10: 1
PAINLEVEL_OUTOF10: 0
PAINLEVEL_OUTOF10: 1
PAINLEVEL_OUTOF10: 0

## 2022-10-08 NOTE — PROGRESS NOTES
Patient is still on clears. He has had a small amount of flatus. He was noted to have minimal drainage from his ALEJANDRINA over the last 24 hours so it was removed. On exam his abdomen is soft and nontender. Wound is dry today. Urine is clear. He has only minimal bowel sounds. I will advance him to a regular diet and will consider discharge in a.m.

## 2022-10-08 NOTE — PROGRESS NOTES
Urology ERAS End of Shift Note    1 Day Post-Op    Daley: Yes    Bowel Movement: No    Bowel Sounds: hypoactive    Flatus: Yes    [unfilled]    Tolerating Diet?: Yes    Ensure Surgery Immunonutrion Shakes - 2 per day (POD 1-5) ? No    Ambulated 3 times. Up to chair for 2 meals.     Lidocaine: No stopped at 1445    PRN Pain Medications Used?: No    IS Used: Yes    Ideal body weight: 73 kg (160 lb 15 oz)     Signed By: Phi Yates RN     October 8, 2022

## 2022-10-08 NOTE — PROGRESS NOTES
Pt denies needs at this time. Pt ambulated another lap and a half on the floor without difficulty. Bed in low position, locked and call light/personal items within reach. Will continue to monitor and report to night shift nurse.

## 2022-10-08 NOTE — PROGRESS NOTES
Pt is doing well. Ambulated one lap on the floor without difficulty. Pain has been controlled thus far during shift. Wife is at the bedside. Will continue to monitor and provide care.

## 2022-10-09 VITALS
BODY MASS INDEX: 33.42 KG/M2 | HEIGHT: 70 IN | WEIGHT: 233.47 LBS | HEART RATE: 79 BPM | SYSTOLIC BLOOD PRESSURE: 124 MMHG | RESPIRATION RATE: 16 BRPM | DIASTOLIC BLOOD PRESSURE: 82 MMHG | TEMPERATURE: 97.9 F | OXYGEN SATURATION: 92 %

## 2022-10-09 PROCEDURE — 6370000000 HC RX 637 (ALT 250 FOR IP): Performed by: UROLOGY

## 2022-10-09 PROCEDURE — 2580000003 HC RX 258: Performed by: UROLOGY

## 2022-10-09 PROCEDURE — 6360000002 HC RX W HCPCS: Performed by: UROLOGY

## 2022-10-09 RX ORDER — HYDROCODONE BITARTRATE AND ACETAMINOPHEN 10; 325 MG/1; MG/1
1 TABLET ORAL EVERY 6 HOURS PRN
Qty: 20 TABLET | Refills: 0 | Status: SHIPPED | OUTPATIENT
Start: 2022-10-09 | End: 2022-10-16

## 2022-10-09 RX ADMIN — VALSARTAN 80 MG: 160 TABLET, FILM COATED ORAL at 09:53

## 2022-10-09 RX ADMIN — ACETAMINOPHEN 1000 MG: 500 TABLET, FILM COATED ORAL at 09:52

## 2022-10-09 RX ADMIN — KETOROLAC TROMETHAMINE 30 MG: 30 INJECTION, SOLUTION INTRAMUSCULAR at 09:54

## 2022-10-09 RX ADMIN — SODIUM CHLORIDE, PRESERVATIVE FREE 5 ML: 5 INJECTION INTRAVENOUS at 09:55

## 2022-10-09 RX ADMIN — KETOROLAC TROMETHAMINE 30 MG: 30 INJECTION, SOLUTION INTRAMUSCULAR at 02:29

## 2022-10-09 RX ADMIN — ACETAMINOPHEN 1000 MG: 500 TABLET, FILM COATED ORAL at 02:32

## 2022-10-09 RX ADMIN — SODIUM CHLORIDE, POTASSIUM CHLORIDE, SODIUM LACTATE AND CALCIUM CHLORIDE: 600; 310; 30; 20 INJECTION, SOLUTION INTRAVENOUS at 01:50

## 2022-10-09 RX ADMIN — NALOXEGOL OXALATE 25 MG: 25 TABLET, FILM COATED ORAL at 09:53

## 2022-10-09 ASSESSMENT — PAIN DESCRIPTION - ORIENTATION
ORIENTATION: MID
ORIENTATION: ANTERIOR
ORIENTATION: ANTERIOR

## 2022-10-09 ASSESSMENT — PAIN DESCRIPTION - LOCATION
LOCATION: ABDOMEN
LOCATION: GROIN
LOCATION: GROIN

## 2022-10-09 ASSESSMENT — PAIN DESCRIPTION - DESCRIPTORS
DESCRIPTORS: ACHING

## 2022-10-09 ASSESSMENT — PAIN SCALES - GENERAL
PAINLEVEL_OUTOF10: 3
PAINLEVEL_OUTOF10: 4
PAINLEVEL_OUTOF10: 4
PAINLEVEL_OUTOF10: 3

## 2022-10-09 ASSESSMENT — PAIN - FUNCTIONAL ASSESSMENT
PAIN_FUNCTIONAL_ASSESSMENT: PREVENTS OR INTERFERES SOME ACTIVE ACTIVITIES AND ADLS
PAIN_FUNCTIONAL_ASSESSMENT: ACTIVITIES ARE NOT PREVENTED

## 2022-10-09 NOTE — PROGRESS NOTES
Patient is doing quite well this morning. He is having flatus and is tolerating a regular diet. His wound looks good and his urine is clear. Discharge home today.

## 2022-10-09 NOTE — PLAN OF CARE
Problem: Pain  Goal: Verbalizes/displays adequate comfort level or baseline comfort level  10/9/2022 1104 by Angi Martell RN  Outcome: Adequate for Discharge  10/8/2022 2244 by Renetta Najera RN  Outcome: Progressing     Problem: Discharge Planning  Goal: Discharge to home or other facility with appropriate resources  10/9/2022 1104 by Angi Martell RN  Outcome: Adequate for Discharge  10/8/2022 2244 by Renetta Najera RN  Outcome: Progressing     Problem: ABCDS Injury Assessment  Goal: Absence of physical injury  10/9/2022 1104 by Angi Martell RN  Outcome: Adequate for Discharge  10/8/2022 2244 by Renetta Najera RN  Outcome: Progressing

## 2022-10-09 NOTE — CARE COORDINATION
Patient is medically stable for discharge. CM met with patient with spouse in the room to confirm any HH needs - patient and spouse declined need for [facility] stating, \"no, we are good. We are going to handle it\". They were encouraged to reach out to patient's PCP if they change their mind after discharge. Patient will be getting discharged home with no supportive care needs at this time. ASSESSMENT NOTE    Attending Physician: Corinna Romero MD  Admit Problem: Prostate cancer [facility]) [facility]  Date/Time of Admission: 10/6/2022  7:40 AM  Problem List:  Patient Active Problem List   Diagnosis    Elevated PSA    Prostate cancer [facility])       Service Assessment  Patient Orientation Alert and Oriented, Place, Person, Situation   Cognition Alert   History Provided By Patient   Primary Caregiver Self   Accompanied By/Relationship Family   Support Systems Spouse/Significant Other, Parent, Family Members   Patient's Healthcare Decision Maker is: Legal Next of Salvatore 71   PCP Verified by CM Yes   Last Visit to PCP Within last 6 months   Prior Functional Level Independent in ADLs/IADLs   Current Functional Level     Can patient return to prior living arrangement Yes   Ability to make needs known: Good   Family able to assist with home care needs: Yes   Would you like for me to discuss the discharge plan with any other family members/significant others, and if so, who?      Financial Resources     Community Resources     CM/SW Referral       Social/Functional History  Lives With Spouse   Type of Home     Home Layout     Home Access     Entrance Stairs - Number of Steps     Entrance Stairs - Rails     Bathroom Shower/Tub     Bathroom Toilet     Bathroom Equipment     Bathroom Accessibility     Home Equipment  (Denies DME use.)   Receives Help From     ADL Assistance Independent   Bath     Dressing     Grooming     Feeding     Toileting     [address]     Meal Prep     Laundry     Vacuuming     Cleaning     Gardening [address] [facility]     Other (Comment)     Homemaking Responsibilities     Meal Prep Responsibility     Laundry Responsibility     Cleaning Responsibility     Bill Paying/Finance [address] [facility] Management     Other (Comment)     Ambulation Assistance Independent   Transfer Assistance     Active  Yes   Patient's  Info     Mode of Transportation Car   Education     Occupation Part time employment   Type of Occupation [employer]. Discharge Planning   Type of [address] Other, Parent, Family Members   Current Services Prior To Admission None   Potential [facility]   DME     DME     DME Ordered? No   Potential Assistance Purchasing Medications No   Meds-to-Beds: Does the patient want to have any new prescriptions delivered to bedside prior to discharge? Type of Home Care Services None   Patient expects to be discharged to: House   Follow Up Appointment: Best Day/Time     One/Two Story Residence: Two story   # of Interior Steps 12   Height of Each Step (in)     [place] Available No   History of Falls? No     Services At/After Discharge  Transition of Care Consult (CM Consult): Discharge Planning   Internal Home Health     Internal Hospice     Reason Outside Agency [address]     Partner SNF     Reason Why Partner SNF Not Chosen     Internal Comfort Care     Reason Outside [address] Discharge     [facility] Resource Information Provided? No   Mode of Transport at Discharge Other (see comment) (Family.)   Hospital Transport Time of Discharge     Confirm Follow Up Transport Self     Condition of Participation: Discharge Planning  The plan for Transition of Care is related to the following treatment goals: Return to baseline.    The Patient and/or Patient Representative was provided with a Choice of Provider? Name of the Patient Representative who was provided with the Choice of Provider and agrees with the Discharge Plan? The Patient and/or Patient Representative Agree with the Discharge Plan? Freedom of Choice list was provided with basic dialogue that supports the individualized plan of care/goals, treatment preferences, and shares the quality data associated with the providers?        Documentation for Discharge Appeal  Discharge Appealed by     Date notified by QIO of appeal request:     Time notified by QIO of appeal request:     Detailed Notice of Discharge given to:     Date Notice of Discharge given:     Time Notice of Discharge given:     Date records sent to QIO     Time records sent to Sarah Ireland     Date Notified of Outcome     Time Notified of Outcome     Outcome of appeal           [address] [facility] 10/09/22 10:23 AM

## 2022-10-10 ENCOUNTER — CARE COORDINATION (OUTPATIENT)
Dept: CARE COORDINATION | Facility: CLINIC | Age: 62
End: 2022-10-10

## 2022-10-10 NOTE — CARE COORDINATION
[facility] Care Transitions Initial Follow Up Call    Call within 2 business days of discharge: Yes    LPN Care Coordinator contacted the patient by telephone to perform post hospital discharge assessment. Verified name and  with patient as identifiers. Provided introduction to self, and explanation of the LPN Care Coordinator role. Patient: Wendi Moss Patient : 1960   MRN: [id number]  Reason for Admission: prostate surgery  Discharge Date: 10/9/22 RARS: Readmission Risk Score: 6      Last Discharge  Street       Date Complaint Diagnosis Description Type Department Provider    10/6/22  Prostate cancer [facility]) Admission (Discharged) [id number] Duane Mitchell MD            Was this an external facility discharge? No Discharge Facility: SFD    Challenges to be reviewed by the provider   Additional needs identified to be addressed with provider: No  none               Method of communication with provider: none. this is my care summary note. LPN Care Coordinator reviewed discharge instructions, medical action plan, and red flags with patient who verbalized understanding. The patient was given an opportunity to ask questions and does not have any further questions or concerns at this time. Were discharge instructions available to patient? Yes. Reviewed appropriate site of care based on symptoms and resources available to patient including: PCP  Specialist  Urgent care clinics  [facility]   When to call [address] The patient agrees to contact the PCP office for questions related to their healthcare. Advance Care Planning:   Does patient have an Advance Directive: not on file; education provided. Medication reconciliation was performed with patient, who verbalizes understanding of administration of home medications.  Medications reviewed, 1111F entered: N/A    Was patient discharged with a pulse oximeter? no    Non-face-to-face services provided:  Obtained and reviewed discharge summary and/or continuity of care documents  Education of patient/family/caregiver/guardian to support self-management-Myah Castillo LPN [phone]  All scheduled appointments. Offered patient enrollment in the Remote Patient Monitoring (RPM) program for in-home monitoring: NA.    Care Transitions 24 Hour Call    Do you have a copy of your discharge instructions?: Yes  Do you have all of your prescriptions and are they filled?: Yes  Have you been [facility]?: No  Have you scheduled your follow up appointment?: Yes  How are you going to get to your appointment?: Car - family or friend to transport  Do you have support at home?: Partner/Spouse/SO  Do you feel like you have everything you need to keep you well at home?: Yes  Are you an active caregiver in your home?: No  Care Transitions Interventions         Follow Up  Future Appointments   Date Time Provider Mary Jo Sherwood   10/27/2022  9:15 AM Sierra Chavarria MD [facility] AMB       LPN Care Coordinator provided contact information. Plan for follow-up call in 5-7 days based on severity of symptoms and risk factors. Plan for next call: self management-diet, hydration, exercise, incision care, follow up appointments.      Yovana Medrano LPN

## 2022-10-12 ENCOUNTER — OFFICE VISIT (OUTPATIENT)
Dept: UROLOGY | Age: 62
End: 2022-10-12

## 2022-10-12 DIAGNOSIS — C61 PROSTATE CANCER (HCC): Primary | ICD-10-CM

## 2022-10-12 PROCEDURE — 99024 POSTOP FOLLOW-UP VISIT: CPT | Performed by: UROLOGY

## 2022-10-12 NOTE — PROGRESS NOTES
[facility]  [address] [facility] [address]  [phone]    Dale Leiva  : 1960    Chief Complaint   Patient presents with    Post-Op Check        HPI     Dale Leiva is a 63 y.o. male had radical retropubic prostatectomy 10-6-22. Has been having bleeding from the incision. Past Medical History:   Diagnosis Date    Bell's palsy 2019    Chronic back pain     Elevated PSA May 2022    Hypertension     medication    Personal history of COVID-19     was hospitalized 1 week    Prostate CA ([address] 2022     Past Surgical History:   Procedure Laterality Date    BACK SURGERY      OTHER SURGICAL HISTORY      surgery for broken nose    PROSTATE SURGERY N/A 2022    MRI FUSION GUIDED BIOPSY performed by Silvia Sims MD at  [address] N/A 10/6/2022    PROSTATECTOMY RADICAL RETROPUBIC, BILATERAL LYMPHADENECTOMY ERAS performed by Silvia Sims MD at [facility] MAIN OR     Current Outpatient Medications   Medication Sig Dispense Refill    HYDROcodone-acetaminophen (NORCO)  MG per tablet Take 1 tablet by mouth every 6 hours as needed for Pain for up to 7 days. Intended supply: 30 days 20 tablet 0    valsartan (DIOVAN) 80 MG tablet Take 80 mg by mouth daily       No current facility-administered medications for this visit.      No Known Allergies  Social History     Socioeconomic History    Marital status:      Spouse name: Not on file    Number of children: Not on file    Years of education: Not on file    Highest education level: Not on file   Occupational History    Not on file   Tobacco Use    Smoking status: Never    Smokeless tobacco: Never   Vaping Use    Vaping Use: Never used   Substance and Sexual Activity    Alcohol use: Never    Drug use: Never    Sexual activity: Not on file   Other Topics Concern    Not on file   Social History Narrative    Not on file     Social Determinants of Health     Financial Resource Strain: Not on file Food Insecurity: Not on file   Transportation Needs: Not on file   Physical Activity: Not on file   Stress: Not on file   Social Connections: Not on file   Intimate Partner Violence: Not on file   Housing Stability: Not on file     Family History   Problem Relation Age of Onset    Hypertension Mother     Suicide Father     Cancer Child         testicular       ROS    There were no vitals taken for this visit. PE: old blood draining from mid incision at skin crease . No purulence. Removed one staple. Expressed moderate amount of black-colored old blood. Urinalysis    Physical Exam    Assessment and Plan    ICD-10-CM    1. Prostate cancer (HCC)  C61       Subcutaneous hematoma. Told him to expect some old bloody drainage for a few days. Call for fever. Change dressing 2x/day. Keep appt next week. No orders of the defined types were placed in this encounter. Follow-up and Dispositions    Return for keep previous appt.

## 2022-10-17 ENCOUNTER — CARE COORDINATION (OUTPATIENT)
Dept: CARE COORDINATION | Facility: CLINIC | Age: 62
End: 2022-10-17

## 2022-10-17 NOTE — CARE COORDINATION
[facility] Care Transitions Follow Up Call    LPN Care Coordinator contacted the patient by telephone to follow up after admission on 10/06/2022. Verified name and  with patient as identifiers. Patient: Zackary Enriquez  Patient : 1960   MRN: [id number]  Reason for Admission: prostate surgery  Discharge Date: 10/9/22 RARS: Readmission Risk Score: 6      Needs to be reviewed by the provider   Additional needs identified to be addressed with provider: No  none             Method of communication with provider: none. This is my care summary note. Addressed changes since last contact:  none  Discussed follow-up appointments. If no appointment was previously scheduled, appointment scheduling offered: Yes. Is follow up appointment scheduled within 7 days of discharge? Yes. Follow Up  Future Appointments   Date Time Provider Mary Jo Sherwood   10/18/2022  9:00 AM SHANAE Felipe - CNP [id number] [facility] AMB   10/27/2022  9:15 AM Zachery Chen MD [facility] AMB     Non-[facility] follow up appointment(s): carolyn    N Care Coordinator reviewed discharge instructions, medical action plan, and red flags with patient and discussed any barriers to care and/or understanding of plan of care after discharge. Discussed appropriate site of care based on symptoms and resources available to patient including: PCP  Specialist  Urgent care clinics  [facility]   When to call [address] The patient agrees to contact the PCP office for questions related to their healthcare. Advance Care Planning:   not on file; education provided. Patients top risk factors for readmission: medical condition-Prostate CA, HTN  Interventions to address risk factors: Obtained and reviewed discharge summary and/or continuity of care documents, Education of patient/family/caregiver/guardian to support self-management-Myah Castillo LPN -[phone], and all scheduled appointments.      Offered patient enrollment in the Remote Patient Monitoring (RPM) program for in-home monitoring: NA.     Care Transitions Subsequent and Final Call    Subsequent and Final Calls  Do you have any ongoing symptoms?: No  Have your medications changed?: No  Do you have any questions related to your medications?: No  Do you currently have any active services?: No  Do you have any needs or concerns that I can assist you with?: No  Identified Barriers: None  Care Transitions Interventions  Other Interventions:             LPN Care Coordinator provided contact information for future needs. Plan for follow-up call in 7-10 days based on severity of symptoms and risk factors. Plan for next call: self management-diet, hydration, exercise, incision site care, follow up appointments.      Jean Carlos Jha LPN

## 2022-10-18 ENCOUNTER — OFFICE VISIT (OUTPATIENT)
Dept: UROLOGY | Age: 62
End: 2022-10-18

## 2022-10-18 DIAGNOSIS — C61 PROSTATE CANCER (HCC): Primary | ICD-10-CM

## 2022-10-18 RX ORDER — CEPHALEXIN 500 MG/1
500 CAPSULE ORAL 2 TIMES DAILY
Qty: 6 CAPSULE | Refills: 0 | Status: SHIPPED | OUTPATIENT
Start: 2022-10-18 | End: 2022-10-21

## 2022-10-18 NOTE — PROGRESS NOTES
[facility]  [address]    [facility] [address], [address] [facility]  [phone]          Dale Leiva  : 1960    Chief Complaint   Patient presents with    Other          HPI     Dale Leiva is a 63 y.o. male being seen today for post op visit. He is s/p Sanju Esters Dr. Kalee Mederos on 10/6/22. He had some issues w post op bleeding for SI. Was seen bt Dr. Kalee Mederos on 10/12/22 and was felt to have hematoma. 1 staple removed during this visit to allow drainage. Today, he reports bleeding has continued. Having issues w constipation. No BM in 4 days. Afebrile. Had some vomiting 3 days ago. Asx today. [place] draining wo issue. Path report reveals PROSTATIC ADENOCARCINOMA (BOB 4 + 3 = 7) INVOLVING RIGHT AND   LEFT POSTERIOR LOBES. EXTRACAPSULAR EXTENSION IS FOCALLY PRESENT ON RIGHT   SIDE. LEFT POSTERIOR PROSTATIC MARGIN OF RESECTION IS FOCALLY INVOLVED BY   ADENOCARCINOMA. PROXIMAL AND DISTAL PROSTATIC AND RIGHT POSTERIOR   PROSTATIC MARGINS OF RESECTION ARE NEGATIVE FOR MALIGNANT TUMOR. RIGHT AND   LEFT SEMINAL VESICLES ARE NEGATIVE FOR MALIGNANT TUMOR     PET scan and NM body scan negative for metastatic disease. No BT.    Past Medical History:   Diagnosis Date    Bell's palsy 2019    Chronic back pain     Elevated PSA May 2022    Hypertension     medication    Personal history of COVID-19     was hospitalized 1 week    Prostate CA ([facility] [address] 2022     Past Surgical History:   Procedure Laterality Date    BACK SURGERY      OTHER SURGICAL HISTORY      surgery for broken nose    PROSTATE SURGERY N/A 2022    MRI FUSION GUIDED BIOPSY performed by Silvia Sims MD at [address] N/A 10/6/2022    PROSTATECTOMY RADICAL RETROPUBIC, BILATERAL LYMPHADENECTOMY ERAS performed by Silvia Sims MD at [facility] MAIN OR     Current Outpatient Medications   Medication Sig Dispense Refill    cephALEXin (Marcin Cos) 500 MG capsule Take 1 capsule by mouth 2 times daily for 3 days 6 capsule 0    valsartan (DIOVAN) 80 MG tablet Take 80 mg by mouth daily       No current facility-administered medications for this visit. No Known Allergies  Social History     Socioeconomic History    Marital status:      Spouse name: Not on file    Number of children: Not on file    Years of education: Not on file    Highest education level: Not on file   Occupational History    Not on file   Tobacco Use    Smoking status: Never    Smokeless tobacco: Never   Vaping Use    Vaping Use: Never used   Substance and Sexual Activity    Alcohol use: Never    Drug use: Never    Sexual activity: Not on file   Other Topics Concern    Not on file   Social History Narrative    Not on file     Social Determinants of Health     Financial Resource Strain: Not on file   Food Insecurity: Not on file   Transportation Needs: Not on file   Physical Activity: Not on file   Stress: Not on file   Social Connections: Not on file   Intimate Partner Violence: Not on file   Housing Stability: Not on file     Family History   Problem Relation Age of Onset    Hypertension Mother     Suicide Father     Cancer Child         testicular       Review of Systems  Constitutional: Negative  GI: Neg GI ROS  Genitourinary: Genitourinary negative    PHYSICAL EXAM    General appearance - well appearing and in no distress  Mental status - alert, oriented to person, place, and time  Neck - supple, no significant adenopathy  Chest/Lung-  Quiet, even and easy respiratory effort without use of accessory muscles  Skin - normal coloration and turgor, no rashes  ML SI to abd w staples. Large clot noted inside distal portion of SI. Staples have  to this area. Slight redness to SI however no sign of infection      Assessment and Plan    ICD-10-CM    1. Prostate cancer ([place]         Dr. Jenelle Bailey called to eval SI. Large amount of clot was expressed from SI.  Advised to leave staples to lower portion of SI. Staples were removed to upper portion. Steri stirps were placed. No evidence of infection. Pt and wife instructed on wound care. VT today. Patient placed in supine position. [place] catheter balloon deflated. Catheter removed intact and w/o difficulty. Patient tolerated well. Push fluids. He is instructed to return to office today by 4 pm if unable to void. Will prescribe keflex 500 mg BID x 3 days for UTI prevention. PSA in 3 months. Will f/u next week for wound check. They are advised to call sooner w worsening sx. [facility] Daisy Sultana is supervising physician today and he approves plan of care.

## 2022-10-25 ENCOUNTER — OFFICE VISIT (OUTPATIENT)
Dept: UROLOGY | Age: 62
End: 2022-10-25

## 2022-10-25 ENCOUNTER — CARE COORDINATION (OUTPATIENT)
Dept: CARE COORDINATION | Facility: CLINIC | Age: 62
End: 2022-10-25

## 2022-10-25 DIAGNOSIS — R97.20 ELEVATED PSA: ICD-10-CM

## 2022-10-25 DIAGNOSIS — T14.8XXA HEMATOMA: ICD-10-CM

## 2022-10-25 DIAGNOSIS — C61 PROSTATE CANCER (HCC): Primary | ICD-10-CM

## 2022-10-25 RX ORDER — CEPHALEXIN 500 MG/1
500 CAPSULE ORAL 3 TIMES DAILY
Qty: 21 CAPSULE | Refills: 0 | Status: SHIPPED | OUTPATIENT
Start: 2022-10-25 | End: 2022-11-01

## 2022-10-25 RX ORDER — CEPHALEXIN 500 MG/1
500 CAPSULE ORAL 3 TIMES DAILY
Qty: 21 CAPSULE | Refills: 0 | Status: SHIPPED | OUTPATIENT
Start: 2022-10-25 | End: 2022-10-25

## 2022-10-25 NOTE — CARE COORDINATION
Care Transitions Outreach Attempt    Call within 2 business days of discharge: Yes   Attempted to reach patient for transitions of care follow up. Unable to reach patient. Patient: Long Virgen Patient : 1960 MRN: [id number]    Last Discharge [address]       Date Complaint Diagnosis Description Type Department Provider    10/6/22  Prostate cancer [facility]) Admission (Discharged) [id number] Desirae Johnson MD              Was this an external facility discharge? No Discharge Facility: SFD    Noted following upcoming appointments from discharge chart review:   [facility] follow up appointment(s):   Future Appointments   Date Time Provider Mary Jo Sherwood   10/25/2022  3:15 PM SHANAE Guevara - CNP [id number] [facility] AMB   2022  3:00 PM Elisabeth Contreras MD [facility] AMB     Non-[facility] follow up appointment(s): carolyn    GEM outreach follow up call. Left message, identified self, reason for call, return call contact information, Kenzie [address]

## 2022-10-25 NOTE — CARE COORDINATION
[facility] Care Transitions Follow Up Call    LPN Care Coordinator contacted the patient by telephone to follow up after admission on 10/06/2022. Verified name and  with patient as identifiers. Patient: Dale Leiva  Patient : 1960   MRN: [id number]  Reason for Admission: prostate surgery  Discharge Date: 10/9/22 RARS: Readmission Risk Score: 6      Needs to be reviewed by the provider   Additional needs identified to be addressed with provider: No  none             Method of communication with provider: none. This is my care summary note. Addressed changes since last contact:  none  Discussed follow-up appointments. If no appointment was previously scheduled, appointment scheduling offered: Yes. Is follow up appointment scheduled within 7 days of discharge? Yes. Follow Up  Future Appointments   Date Time Provider Mary Jo Sherwood   10/25/2022  3:15 PM SHANAE Pradhan - CNP [id number] [facility] AMB   2022  3:00 PM Sierra Chavarria MD [facility]     Non-[facility] follow up appointment(s): carolyn    N Care Coordinator reviewed discharge instructions, medical action plan, and red flags with patient and discussed any barriers to care and/or understanding of plan of care after discharge. Discussed appropriate site of care based on symptoms and resources available to patient including: PCP  Specialist  Urgent care clinics  [facility]   When to call [address] The patient agrees to contact the PCP office for questions related to their healthcare. Advance Care Planning:   not on file; education provided. Patients top risk factors for readmission: medical condition-Prostate CA, HTN  Interventions to address risk factors: Obtained and reviewed discharge summary and/or continuity of care documents, Education of patient/family/caregiver/guardian to support self-management-Myah Castillo LPN -[phone], and all scheduled appointments.      Offered patient enrollment in the Remote Patient Monitoring (RPM) program for in-home monitoring: NA.     Care Transitions Subsequent and Final Call    Subsequent and Final Calls  Do you have any ongoing symptoms?: No  Have your medications changed?: No  Do you have any questions related to your medications?: No  Do you currently have any active services?: No  Do you have any needs or concerns that I can assist you with?: No  Identified Barriers: None  Care Transitions Interventions  Other Interventions:             LPN Care Coordinator provided contact information for future needs. Plan for follow-up call in 7-10 days based on severity of symptoms and risk factors. Plan for next call: self management-diet, hydration, wound care,     Patient returned call. States overall feeling well with good appetite, and adequate hydration. Voiding without difficulty after he catheter removal last week. Expresses concerns regarding continued issues with hematoma at surgical site requiring home wound care with dressing changes 1-2 times daily. States is returning to urology office today for reevaluation and denies any other health care concerns. Contact information provided, Suleiman Culver LPN [address] [place] [address] [phone], encouraged to call with questions or concerns. Patient in agreement with GEM follow up call in 1 week.           Doug Salazar LPN

## 2022-10-25 NOTE — PROGRESS NOTES
[facility]  [address]    Ruslan Shannon [address], [address] [facility]  [phone]          Tamra Chandler  : 1960    Chief Complaint   Patient presents with    Follow-up    Prostate Cancer          HPI     Tamra Chandler is a 63 y.o. male being seen today for FU. He is s/p Barbie Ree Dr. Grand black on 10/6/22. He had some issues w post op bleeding for SI. Was seen bt Dr. Grand black on 10/12/22 and was felt to have hematoma. 1 staple removed during this visit to allow drainage. Seen 10/18/22 . He reported bleeding has continued. Had some issues w constipation. This has resolved. Catheter was removed. He is voiding wo issue however having sig urine leakage requiring use of brief. Continues to have bleeding from mid SI. Denies fever or wound drainage. Wife reports excessive fatigue. Path report reveals PROSTATIC ADENOCARCINOMA (BOB 4 + 3 = 7) INVOLVING RIGHT AND   LEFT POSTERIOR LOBES. EXTRACAPSULAR EXTENSION IS FOCALLY PRESENT ON RIGHT   SIDE. LEFT POSTERIOR PROSTATIC MARGIN OF RESECTION IS FOCALLY INVOLVED BY   ADENOCARCINOMA. PROXIMAL AND DISTAL PROSTATIC AND RIGHT POSTERIOR   PROSTATIC MARGINS OF RESECTION ARE NEGATIVE FOR MALIGNANT TUMOR. RIGHT AND   LEFT SEMINAL VESICLES ARE NEGATIVE FOR MALIGNANT TUMOR      PET scan and NM body scan negative for metastatic disease. No BT.          Past Medical History:   Diagnosis Date    Bell's palsy 2019    Chronic back pain     Elevated PSA May 2022    Hypertension     medication    Personal history of COVID-19     was hospitalized 1 week    Prostate CA ([address] 2022     Past Surgical History:   Procedure Laterality Date    BACK SURGERY      OTHER SURGICAL HISTORY      surgery for broken nose    PROSTATE SURGERY N/A 2022    MRI FUSION GUIDED BIOPSY performed by Anitra Chappell MD at [address] A N/A 10/6/2022    PROSTATECTOMY RADICAL RETROPUBIC, BILATERAL LYMPHADENECTOMY ERAS performed by Thalia Cruz MD at [facility] MAIN OR     Current Outpatient Medications   Medication Sig Dispense Refill    cephALEXin (KEFLEX) 500 MG capsule Take 1 capsule by mouth 3 times daily for 7 days 21 capsule 0    valsartan (DIOVAN) 80 MG tablet Take 80 mg by mouth daily       No current facility-administered medications for this visit. No Known Allergies  Social History     Socioeconomic History    Marital status:      Spouse name: Not on file    Number of children: Not on file    Years of education: Not on file    Highest education level: Not on file   Occupational History    Not on file   Tobacco Use    Smoking status: Never    Smokeless tobacco: Never   Vaping Use    Vaping Use: Never used   Substance and Sexual Activity    Alcohol use: Never    Drug use: Never    Sexual activity: Not on file   Other Topics Concern    Not on file   Social History Narrative    Not on file     Social Determinants of Health     Financial Resource Strain: Not on file   Food Insecurity: Not on file   Transportation Needs: Not on file   Physical Activity: Not on file   Stress: Not on file   Social Connections: Not on file   Intimate Partner Violence: Not on file   Housing Stability: Not on file     Family History   Problem Relation Age of Onset    Hypertension Mother     Suicide Father     Cancer Child         testicular       Review of Systems  Constitutional: Negative  GI: Neg GI ROS  Genitourinary: Genitourinary negative    Urinalysis  UA - Dipstick  PHYSICAL EXAM    General appearance - well appearing and in no distress  Mental status - alert, oriented to person, place, and time  Neck - supple, no significant adenopathy  Chest/Lung-  Quiet, even and easy respiratory effort without use of accessory muscles  Skin - normal coloration and turgor, no rashes, opening to mid SI w large bloody output. Above this is an opening tissue protruding. Area distal to SI is firm.        Assessment and Plan    ICD-10-CM    1. Prostate cancer ([facility] [address]  C61 CANCELED: AMB POC URINALYSIS DIP STICK AUTO W/O MICRO      2. Elevated PSA  R97.20 CANCELED: AMB POC URINALYSIS DIP STICK AUTO W/O MICRO      3. Hematoma  T14. 8XXA CT ABDOMEN PELVIS W IV CONTRAST Additional Contrast? Radiologist Recommendation     CBC with Auto Differential     CBC with Auto Differential     DISCONTINUED: cephALEXin (KEFLEX) 500 MG capsule        All staples were removed intact today. Steri strips placed. Wound care discussed. Wound does not appear infected, however will send for CT A/P w contrast to rule out abscess. Start keflex TID for 7 days. Check CBC today. Will call results. FU pending testing. To call sooner if needed. Linnea Gallegos is supervising physician today and he approves plan of care.

## 2022-10-26 LAB
BASOPHILS # BLD: 0.1 K/UL (ref 0–0.2)
BASOPHILS NFR BLD: 1 % (ref 0–2)
DIFFERENTIAL METHOD BLD: ABNORMAL
EOSINOPHIL # BLD: 0.2 K/UL (ref 0–0.8)
EOSINOPHIL NFR BLD: 3 % (ref 0.5–7.8)
ERYTHROCYTE [DISTWIDTH] IN BLOOD BY AUTOMATED COUNT: 13.5 % (ref 11.9–14.6)
HCT VFR BLD AUTO: 45.5 % (ref 41.1–50.3)
HGB BLD-MCNC: 14.3 G/DL (ref 13.6–17.2)
IMM GRANULOCYTES # BLD AUTO: 0.1 K/UL (ref 0–0.5)
IMM GRANULOCYTES NFR BLD AUTO: 1 % (ref 0–5)
LYMPHOCYTES # BLD: 1.4 K/UL (ref 0.5–4.6)
LYMPHOCYTES NFR BLD: 19 % (ref 13–44)
MCH RBC QN AUTO: 29.5 PG (ref 26.1–32.9)
MCHC RBC AUTO-ENTMCNC: 31.4 G/DL (ref 31.4–35)
MCV RBC AUTO: 93.8 FL (ref 82–102)
MONOCYTES # BLD: 0.6 K/UL (ref 0.1–1.3)
MONOCYTES NFR BLD: 8 % (ref 4–12)
NEUTS SEG # BLD: 5.3 K/UL (ref 1.7–8.2)
NEUTS SEG NFR BLD: 68 % (ref 43–78)
NRBC # BLD: 0 K/UL (ref 0–0.2)
PLATELET # BLD AUTO: 516 K/UL (ref 150–450)
PMV BLD AUTO: 9.6 FL (ref 9.4–12.3)
RBC # BLD AUTO: 4.85 M/UL (ref 4.23–5.6)
WBC # BLD AUTO: 7.7 K/UL (ref 4.3–11.1)

## 2022-10-27 NOTE — DISCHARGE SUMMARY
Discharge Summary    Date: 10/27/2022  Patient Name: Sallie Greene    YOB: 1960     Age: 63 y.o. Admit Date: 10/6/2022  Discharge Date: 10/9/2022  Discharge Condition: Good    Admission Diagnosis  Prostate cancer [facility]) [facility]      Discharge Diagnosis  Principal Problem:    Prostate cancer [facility])  Resolved Problems:    * No resolved hospital problems. [facility] Stay  Narrative of Hospital Course:  62 yo male with hx of prostate cancer, s/p bilateral pelvic lymphadenectomy and radical retropubic prostatectomy on 10/6/22. Had some bleeding at incision site that did improve. ALEJANDRINA was removed. Pt progressed well, passed flatus and  tolerated regular diet. He discharged home with he catheter and will RTO as scheduled. Consultants:  None    Surgeries/procedures Performed:      Treatments:            Discharge Plan/Disposition:  Home    Hospital/Incidental Findings Requiring Follow Up:    Patient Instructions:    Diet: Regular Diet    Activity:No Lifting, Driving or Strenuous Excercise and No Driving While on Analgesics  For number of days (if applicable): Other Instructions:    Provider Follow-Up:   No follow-ups on file.      Significant Diagnostic Studies:    Recent Labs:  Admission on 10/06/2022, Discharged on 10/09/2022  Crossmatch expiration date                    Date: 10/06/2022  Value: 10/09/2022,2359                       Status: Final  ABO/Rh                                        Date: 10/06/2022  Value: O POSITIVE    Status: Final  Antibody Screen                               Date: 10/06/2022  Value: NEG           Status: Final  Sodium                                        Date: 10/06/2022  Value: 141         Ref range: 136 - 145 mmol/L   Status: Final  Potassium                                     Date: 10/06/2022  Value: 4.0         Ref range: 3.5 - 5.1 mmol/L   Status: Final  Chloride                                      Date: 10/06/2022  Value: 108         Ref range: 101 - 110 mmol/L   Status: Final  CO2                                           Date: 10/06/2022  Value: 23          Ref range: 21 - 32 mmol/L     Status: Final  Anion Gap                                     Date: 10/06/2022  Value: 10          Ref range: 4 - 13 mmol/L      Status: Final  Glucose                                       Date: 10/06/2022  Value: 163 (A)     Ref range: 65 - 100 mg/dL     Status: Final  BUN                                           Date: 10/06/2022  Value: 14          Ref range: 8 - 23 MG/DL       Status: Final  Creatinine                                    Date: 10/06/2022  Value: 1.20        Ref range: 0.8 - 1.5 MG/DL    Status: Final  Est, Glom Filt Rate                           Date: 10/06/2022  Value: >60         Ref range: >60 ml/min/1.73m2  Status: Final                Comment:      Pediatric calculator link: [email or web address] org/professionals/kdoqi/gfr_calculatorped       Effective Oct 3, 2022       These results are not intended for use in patients <25years of age. eGFR results are calculated without a race factor using  the 2021 CKD-EPI equation. Careful clinical correlation is recommended, particularly when comparing to results calculated using previous equations. The CKD-EPI equation is less accurate in patients with extremes of muscle mass, extra-renal metabolism of creatinine, excessive creatine ingestion, or following therapy that affects renal tubular secretion.     Calcium                                       Date: 10/06/2022  Value: 7.9 (A)     Ref range: 8.3 - 10.4 MG/DL   Status: Final  Sodium                                        Date: 10/07/2022  Value: 137 (A)     Ref range: 138 - 145 mmol/L   Status: Final  Potassium                                     Date: 10/07/2022  Value: 4.6         Ref range: 3.5 - 5.1 mmol/L   Status: Final  Chloride                                      Date: 10/07/2022  Value: 105         Ref range: 101 - 110 mmol/L   Status: Final  CO2                                           Date: 10/07/2022  Value: 26          Ref range: 21 - 32 mmol/L     Status: Final  Anion Gap                                     Date: 10/07/2022  Value: 6           Ref range: 4 - 13 mmol/L      Status: Final  Glucose                                       Date: 10/07/2022  Value: 132 (A)     Ref range: 65 - 100 mg/dL     Status: Final  BUN                                           Date: 10/07/2022  Value: 17          Ref range: 8 - 23 MG/DL       Status: Final  Creatinine                                    Date: 10/07/2022  Value: 1.20        Ref range: 0.8 - 1.5 MG/DL    Status: Final  Est, Glom Filt Rate                           Date: 10/07/2022  Value: >60         Ref range: >60 ml/min/1.73m2  Status: Final                Comment:      Pediatric calculator link: [email or web address] org/professionals/kdoqi/gfr_calculatorped       Effective Oct 3, 2022       These results are not intended for use in patients <25years of age. eGFR results are calculated without a race factor using  the 2021 CKD-EPI equation. Careful clinical correlation is recommended, particularly when comparing to results calculated using previous equations. The CKD-EPI equation is less accurate in patients with extremes of muscle mass, extra-renal metabolism of creatinine, excessive creatine ingestion, or following therapy that affects renal tubular secretion.     Calcium                                       Date: 10/07/2022  Value: 8.0 (A)     Ref range: 8.3 - 10.4 MG/DL   Status: Final  Magnesium                                     Date: 10/07/2022  Value: 2.2         Ref range: 1.8 - 2.4 mg/dL    Status: Final  Phosphorus                                    Date: 10/07/2022  Value: 3.1         Ref range: 2.3 - 3.7 MG/DL    Status: Final  WBC                                           Date: 10/07/2022  Value: 14.5 (A)    Ref range: 4.3 - 11.1 K/uL    Status: Final  RBC Date: 10/07/2022  Value: 4.66        Ref range: 4.23 - 5.6 M/uL    Status: Final  Hemoglobin                                    Date: 10/07/2022  Value: 14.1        Ref range: 13.6 - 17.2 g/dL   Status: Final  Hematocrit                                    Date: 10/07/2022  Value: 43.1        Ref range: 41.1 - 50.3 %      Status: Final  MCV                                           Date: 10/07/2022  Value: 92.5        Ref range: 79.6 - 97.8 FL     Status: Final  MCH                                           Date: 10/07/2022  Value: 30.3        Ref range: 26.1 - 32.9 PG     Status: Final  MCHC                                          Date: 10/07/2022  Value: 32.7        Ref range: 31.4 - 35.0 g/dL   Status: Final  RDW                                           Date: 10/07/2022  Value: 13.4        Ref range: 11.9 - 14.6 %      Status: Final  Platelets                                     Date: 10/07/2022  Value: 298         Ref range: 150 - 450 K/uL     Status: Final  MPV                                           Date: 10/07/2022  Value: 9.4         Ref range: 9.4 - 12.3 FL      Status: Final  nRBC                                          Date: 10/07/2022  Value: 0.00        Ref range: 0.0 - 0.2 K/uL     Status: Final                Comment: **Note: Absolute NRBC parameter is now reported with Hemogram**  Hemoglobin                                    Date: 10/06/2022  Value: 14.4        Ref range: 13.6 - 17.2 g/dL   Status: Final  Hematocrit                                    Date: 10/06/2022  Value: 43.9        Ref range: 41.1 - 50.3 %      Status: Final  Sodium                                        Date: 10/08/2022  Value: 138         Ref range: 136 - 145 mmol/L   Status: Final  Potassium                                     Date: 10/08/2022  Value: 4.0         Ref range: 3.5 - 5.1 mmol/L   Status: Final  Chloride                                      Date: 10/08/2022  Value: 109         Ref range: 101 - 110 mmol/L   Status: Final  CO2                                           Date: 10/08/2022  Value: 28          Ref range: 21 - 32 mmol/L     Status: Final  Anion Gap                                     Date: 10/08/2022  Value: 1 (A)       Ref range: 4 - 13 mmol/L      Status: Final  Glucose                                       Date: 10/08/2022  Value: 97          Ref range: 65 - 100 mg/dL     Status: Final  BUN                                           Date: 10/08/2022  Value: 17          Ref range: 8 - 23 MG/DL       Status: Final  Creatinine                                    Date: 10/08/2022  Value: 1.00        Ref range: 0.8 - 1.5 MG/DL    Status: Final  Est, Glom Filt Rate                           Date: 10/08/2022  Value: >60         Ref range: >60 ml/min/1.73m2  Status: Final                Comment:      Pediatric calculator link: [employer].[email or web address] org/professionals/kdoqi/gfr_calculatorped       Effective Oct 3, 2022       These results are not intended for use in patients <25years of age. eGFR results are calculated without a race factor using  the 2021 CKD-EPI equation. Careful clinical correlation is recommended, particularly when comparing to results calculated using previous equations. The CKD-EPI equation is less accurate in patients with extremes of muscle mass, extra-renal metabolism of creatinine, excessive creatine ingestion, or following therapy that affects renal tubular secretion.     Calcium                                       Date: 10/08/2022  Value: 7.7 (A)     Ref range: 8.3 - 10.4 MG/DL   Status: Final  WBC                                           Date: 10/08/2022  Value: 10.3        Ref range: 4.3 - 11.1 K/uL    Status: Final  RBC                                           Date: 10/08/2022  Value: 3.79 (A)    Ref range: 4.23 - 5.6 M/uL    Status: Final  Hemoglobin                                    Date: 10/08/2022  Value: 11.4 (A)    Ref range: 13.6 - 17.2 g/dL Status: Final  Hematocrit                                    Date: 10/08/2022  Value: 34.6 (A)    Ref range: 41.1 - 50.3 %      Status: Final  MCV                                           Date: 10/08/2022  Value: 91.3        Ref range: 79.6 - 97.8 FL     Status: Final  MCH                                           Date: 10/08/2022  Value: 30.1        Ref range: 26.1 - 32.9 PG     Status: Final  MCHC                                          Date: 10/08/2022  Value: 32.9        Ref range: 31.4 - 35.0 g/dL   Status: Final  RDW                                           Date: 10/08/2022  Value: 14.1        Ref range: 11.9 - 14.6 %      Status: Final  Platelets                                     Date: 10/08/2022  Value: 228         Ref range: 150 - 450 K/uL     Status: Final  MPV                                           Date: 10/08/2022  Value: 9.1 (A)     Ref range: 9.4 - 12.3 FL      Status: Final  nRBC                                          Date: 10/08/2022  Value: 0.00        Ref range: 0.0 - 0.2 K/uL     Status: Final                Comment: **Note: Absolute NRBC parameter is now reported with Hemogram**  ------------    Radiology last 7 days:  No results found. [unfilled]    Discharge Medications    Discharge Medication List as of 10/9/2022 12:12 PM    START taking these medications    HYDROcodone-acetaminophen (NORCO)  MG per tablet  Take 1 tablet by mouth every 6 hours as needed for Pain for up to 7 days.  Intended supply: 30 days, Disp-20 tablet, R-0  Normal          Discharge Medication List as of 10/9/2022 12:12 PM        Discharge Medication List as of 10/9/2022 12:12 PM    CONTINUE these medications which have NOT CHANGED    valsartan (DIOVAN) 80 MG tablet  Take 80 mg by mouth daily  Historical Med          Discharge Medication List as of 10/9/2022 12:12 PM        Time Spent on Discharge:  minutes were spent in patient examination, evaluation, counseling as well as medication reconciliation, prescriptions for required medications, discharge plan, and follow up.     Electronically signed by SHANAE Benton CNP on 10/27/22 at 9:54 AM EDT

## 2022-10-28 DIAGNOSIS — T14.8XXA HEMATOMA: ICD-10-CM

## 2022-11-01 ENCOUNTER — CARE COORDINATION (OUTPATIENT)
Dept: CARE COORDINATION | Facility: CLINIC | Age: 62
End: 2022-11-01

## 2022-11-01 NOTE — CARE COORDINATION
Care Transitions Outreach Attempt    Call within 2 business days of discharge: Yes   Attempted to reach patient for transitions of care follow up. Unable to reach patient. Patient: Long Virgen Patient : 1960 MRN: [id number]    Last Discharge [address]       Date Complaint Diagnosis Description Type Department Provider    10/6/22  Prostate cancer [facility]) Admission (Discharged) [id number] Desirae Johnson MD              Was this an external facility discharge? No Discharge Facility: [facility]    Noted following upcoming appointments from discharge chart review:   [facility] follow up appointment(s):   Future Appointments   Date Time Provider Mary Jo Sherwood   2022 10:10 AM Desirae Johnson MD [id number] AMB   2022  3:00 PM Elisabeth Contreras MD [facility] AMB     Non-[facility] follow up appointment(s): na    [facility] outreach follow up call. Left message, identified self, reason for call, return call contact information, [place] [address].

## 2022-11-04 ENCOUNTER — OFFICE VISIT (OUTPATIENT)
Dept: UROLOGY | Age: 62
End: 2022-11-04
Payer: COMMERCIAL

## 2022-11-04 DIAGNOSIS — C61 PROSTATE CANCER (HCC): ICD-10-CM

## 2022-11-04 DIAGNOSIS — C61 PROSTATE CANCER (HCC): Primary | ICD-10-CM

## 2022-11-04 LAB
BILIRUBIN, URINE, POC: NEGATIVE
BLOOD URINE, POC: NORMAL
GLUCOSE URINE, POC: NEGATIVE
KETONES, URINE, POC: NEGATIVE
LEUKOCYTE ESTERASE, URINE, POC: NORMAL
NITRITE, URINE, POC: NEGATIVE
PH, URINE, POC: 6 (ref 4.6–8)
PROTEIN,URINE, POC: 30
PSA SERPL-MCNC: 0.4 NG/ML
SPECIFIC GRAVITY, URINE, POC: 1.03 (ref 1–1.03)
URINALYSIS CLARITY, POC: NORMAL
URINALYSIS COLOR, POC: NORMAL
UROBILINOGEN, POC: NORMAL

## 2022-11-04 PROCEDURE — 81003 URINALYSIS AUTO W/O SCOPE: CPT | Performed by: UROLOGY

## 2022-11-04 PROCEDURE — 99024 POSTOP FOLLOW-UP VISIT: CPT | Performed by: UROLOGY

## 2022-11-04 ASSESSMENT — ENCOUNTER SYMPTOMS: NAUSEA: 0

## 2022-11-04 NOTE — PROGRESS NOTES
[facility] Urology  [address] [place] [address] Way  [address] [facility], [address] [facility]  [phone]    John Moise  : 1960    No chief complaint on file. HPI     John Moise is a 63 y.o. male   Referred by Dr. Ramon Bell for evaluation and treatment of elevated PSA. PSA 7.5 in . No previous values noted. No voiding problems. No family hx of prostate cancer. No weight loss or bone pain . GARFIELD showed no nodules. Repeat PSA 8.5 in . MRI 22:   FINDINGS:   Last PSA: 7.5   Prostate Size: 4.0 x 4.2 x 2.7 cm with a calculated volume of  23.5 mL. PSA Density: 0.32 ng/mL       BPH: No significant BPH. Hemorrhage: None. Peripheral Zone: There is areas of T2 hyperintensity throughout both peripheral   zones. No suspicious lesions are described below. Transition/Central Zone: No suspicious transition zone lesion. Lesion # 1   -Size and location: A 1.5 x 1.3 x 1.2 cm T2 hypointense area within the right   peripheral zone of the gland base (series 4 image 15 and series 6 image 16). -DWI/ADC: Diffusion restriction. -DCE: The lesion demonstrates enhancement.   -Prostate Margin: No evidence of extracapsular extension. -PI-RADS Category: 5       Lesion # 2   -Size and location: A 0.9 x 0.7 cm T2 hypointense area within the left   peripheral zone of the mid gland (series 4 image 16). -DWI/ADC: Mild diffusion restriction. -DCE: No appreciable asymmetric enhancement.   -Prostate Margin: No evidence of extracapsular extension. -PI-RADS Category: 3       Seminal Vesicles: The seminal vesicles are unremarkable. Lymph Nodes: No appreciable pelvic lymphadenopathy. Other: Bilateral fat-containing inguinal hernias. Impression   1. PI-RADS 5 lesion within the right peripheral zone of the gland base,   suspicious for malignancy. 2.  PI-RADS 3 lesion within the left peripheral zone of the mid gland.    3.  No evidence fracture capsular extension or pelvic lymphadenopathy. Had MRI fusion prostate biopsy on 8-4-22: PROSTATE VOLUME:  32 gm PSA 8.5  PSAD 0.27  Path: DIAGNOSIS       A:  \"PROSTATE, REGION OF INTEREST #1 RIGHT BASE, BIOPSY\":               PROSTATIC ADENOCARCINOMA, BOB SCORE 4 + 3 = 7 (GRADE GROUP   3),                  INVOLVING <10% OF BIOPSY MATERIAL. B:  \"PROSTATE, REGION OF INTEREST #2 LEFT APEX, BIOPSY\":               FOCUS OF ATYPICAL GLANDS SUSPICIOUS FOR ADENOCARCINOMA. C:  \"PROSTATE, LEFT LATERAL BASE, BIOPSY\":               BENIGN PROSTATE TISSUE. D:  \"PROSTATE, LEFT LATERAL MID, BIOPSY\":               BENIGN PROSTATE TISSUE. E:  \"PROSTATE, LEFT LATERAL APEX, BIOPSY\":               BENIGN PROSTATE TISSUE. F:  \"PROSTATE, LEFT BASE, BIOPSY\":               BENIGN PROSTATE TISSUE. G:  \"PROSTATE, LEFT MID, BIOPSY\":               BENIGN PROSTATE TISSUE. H:  \"PROSTATE, LEFT APEX, BIOPSY\":               PROSTATIC ADENOCARCINOMA, BOB SCORE 3 + 3 = 6 (GRADE GROUP   1),                     INVOLVING LESS THAN 5% OF 1 CORE. I:  \"PROSTATE, RIGHT LATERAL BASE, BIOPSY\":               PROSTATIC ADENOCARCINOMA, BOB SCORE 4 + 3 = 7 (GRADE GROUP   3),                  INVOLVING 20% OF BIOPSY MATERIAL.             J:  \"PROSTATE, RIGHT LATERAL MID, BIOPSY\":               PROSTATIC ADENOCARCINOMA, BOB SCORE 4 + 4 = 8 (GRADE GROUP   4),                  DISCONTINUOUSLY INVOLVING 70% OF 1 CORE.                       K:  \"PROSTATE, RIGHT LATERAL APEX, BIOPSY\":               PROSTATIC ADENOCARCINOMA, BOB SCORE 4 + 3 = 7 (GRADE GROUP   3),                  DISCONTINUOUSLY INVOLVING 30% OF 1 CORE.          L:  \"PROSTATE, RIGHT BASE, BIOPSY\":               PROSTATIC ADENOCARCINOMA, BOB SCORE 3 + 3 = 6 (GRADE GROUP   1),                  INVOLVING <5 % OF 1 CORE.          M:  \"PROSTATE, RIGHT MID, BIOPSY\":             PROSTATIC ADENOCARCINOMA, BOB SCORE 4 + 3 = 7 C:  . Microscopic examination reveals prostate tissue negative for   malignant tumor. Dr. Dewey Scheuermann concurs   D:        PROCEDURE:  prostatectomy   PROSTATE SIZE:  approximate 6.2 x 4.4 x 3.1 cm   PROSTATE WEIGHT:  approximately 34 g   RIGHT POSTERIOR LOBE:  focally involved by adenocarcinoma   LEFT POSTERIOR LOBE:  focally involved by adenocarcinoma   HISTOLOGIC TYPE:  adenocarcinoma   HISTOLOGIC GRADE:          PRIMARY PATTERN: 4          SECONDARY PATTERN: 3          TERTIARY PATTERN: 5          TOTAL thGthRthAthDthEth:th th6th GRADE GROUP: 3   TUMOR QUANTITATION:  tumor involves approximately 25% of the right   posterior lobe submitted and less than 10% of left posterior lobe   submitted   SURGICAL MARGIN STATUS:          APEX:  negative          BLADDER NECK:  negative          RIGHT POSTERIOR PROSTATE MARGIN OF RESECTION:  negative          LEFT POSTERIOR PROSTATE MARGIN OF RESECTION:  tumor appears to   focally involve marked margin over an area of 1-2 mm on more than one   slide   RIGHT SEMINAL VESICLE INVOLVEMENT:  negative   LEFT SEMINAL VESICLE INVOLVEMENT:  negative   EXTENSION TO INVOLVE BLADDER NECK:  focally positive   EXTRACAPSULAR EXTENSION:  focally present on right side   LYMPHOVASCULAR SPACE INVASION:  focally present   TREATMENT EFFECT (RADIATION/HORMONAL):  history of prior treatment is not   received   HIGH GRADE PIN:  focally present   INVOLVEMENT OF PERINEURAL SPACES:  focally present   LYMPH NODE STATUS:         TOTAL NUMBER OF LYMPH NODES EXAMINED: 4         TOTAL NUMBER OF LYMPH NODES CONTAINING METASTATIC CARCINOMA:  0         LOCATION OF POSITIVE NODES:  does not apply   TNM CLASSIFICATION: pT3, pN0. Dr. Dewey Scheuermann conclulu     CT at [address] shows prepubic hematoma. He seems to be doing well.    Past Medical History:   Diagnosis Date    Bell's palsy 06/2019    Chronic back pain     Elevated PSA May 2022    Hypertension     medication    Personal history of COVID-19 2020    was hospitalized 1 week    Prostate CA ([facility] [address] 09/2022     Past Surgical History:   Procedure Laterality Date    BACK SURGERY      OTHER SURGICAL HISTORY      surgery for broken nose    PROSTATE SURGERY N/A 08/04/2022    MRI FUSION GUIDED BIOPSY performed by Marti Marquez MD at [facility] MAIN OR    PROSTATECTOMY N/A 10/6/2022    PROSTATECTOMY RADICAL RETROPUBIC, BILATERAL LYMPHADENECTOMY ERAS performed by Marti Marquez MD at [facility] MAIN OR     Current Outpatient Medications   Medication Sig Dispense Refill    valsartan (DIOVAN) 80 MG tablet Take 80 mg by mouth daily       No current facility-administered medications for this visit. No Known Allergies  Social History     Socioeconomic History    Marital status:      Spouse name: Not on file    Number of children: Not on file    Years of education: Not on file    Highest education level: Not on file   Occupational History    Not on file   Tobacco Use    Smoking status: Never    Smokeless tobacco: Never   Vaping Use    Vaping Use: Never used   Substance and Sexual Activity    Alcohol use: Never    Drug use: Never    Sexual activity: Not on file   Other Topics Concern    Not on file   Social History Narrative    Not on file     Social Determinants of Health     Financial Resource Strain: Not on file   Food Insecurity: Not on file   Transportation Needs: Not on file   Physical Activity: Not on file   Stress: Not on file   Social Connections: Not on file   Intimate Partner Violence: Not on file   Housing Stability: Not on file     Family History   Problem Relation Age of Onset    Hypertension Mother     Suicide Father     Cancer Child         testicular       Review of Systems  Constitutional:   Negative for fever. GI:  Negative for nausea. Genitourinary:  Negative for flank pain. There were no vitals taken for this visit. PE; small open area in incision.    Urinalysis  UA - Dipstick  Results for orders placed or performed in visit on 11/04/22   AMB POC URINALYSIS DIP STICK AUTO W/O MICRO   Result Value Ref Range    Color (UA POC)      Clarity (UA POC)      Glucose, Urine, POC Negative Negative    Bilirubin, Urine, POC Negative Negative    KETONES, Urine, POC Negative Negative    Specific Gravity, Urine, POC 1.030 1.001 - 1.035    Blood (UA POC) Small Negative    pH, Urine, POC 6.0 4.6 - 8.0    Protein, Urine, POC 30 Negative    Urobilinogen, POC 0.2 mg/dL     Nitrite, Urine, POC Negative Negative    Leukocyte Esterase, Urine, POC Trace Negative       UA - Micro  WBC - 0  RBC - 0  Bacteria - 0  Epith - 0    Physical Exam    Assessment and Plan    Diagnoses and all orders for this visit:    Prostate cancer ([facility] [address]  -     AMB POC URINALYSIS DIP STICK AUTO W/O MICRO  -     PSA, Diagnostic; Future     Discussed Yoon. PSA today. Call patient with results    Follow-up and Dispositions    Return in about 6 weeks (around 12/16/2022).

## 2022-11-15 ENCOUNTER — CARE COORDINATION (OUTPATIENT)
Dept: CARE COORDINATION | Facility: CLINIC | Age: 62
End: 2022-11-15

## 2022-11-15 NOTE — CARE COORDINATION
Patient has graduated from the Care Transitions program on 11/15/2022. Patient/family has the ability to self-manage at this time. Patient has no further care management needs, no referral to the [facility] team for further management. Patient has Care Transition Nurse's contact information for any further questions, concerns, or needs. Prabhusunday Leos [address] [phone].   Patients upcoming visits:    Future Appointments   Date Time Provider Mary Jo Sherwood   12/14/2022 11:30 AM PGU PATRICIA BLOOD DRAWS [facility] AMB   12/20/2022  3:00 PM Shay Mohr MD [facility] AMB   12/21/2022 10:40 AM MD SEBAS Lau [facility] AMB

## 2022-12-14 ENCOUNTER — NURSE ONLY (OUTPATIENT)
Dept: UROLOGY | Age: 62
End: 2022-12-14

## 2022-12-14 DIAGNOSIS — C61 PROSTATE CANCER (HCC): Primary | ICD-10-CM

## 2022-12-14 DIAGNOSIS — C61 PROSTATE CANCER (HCC): ICD-10-CM

## 2022-12-14 LAB — PSA SERPL-MCNC: 0.3 NG/ML

## 2022-12-21 ENCOUNTER — OFFICE VISIT (OUTPATIENT)
Dept: UROLOGY | Age: 62
End: 2022-12-21
Payer: COMMERCIAL

## 2022-12-21 DIAGNOSIS — C61 PROSTATE CANCER (HCC): Primary | ICD-10-CM

## 2022-12-21 LAB
BILIRUBIN, URINE, POC: NEGATIVE
BLOOD URINE, POC: NEGATIVE
GLUCOSE URINE, POC: NEGATIVE
KETONES, URINE, POC: NEGATIVE
LEUKOCYTE ESTERASE, URINE, POC: NEGATIVE
NITRITE, URINE, POC: NEGATIVE
PH, URINE, POC: 6 (ref 4.6–8)
PROTEIN,URINE, POC: NEGATIVE
SPECIFIC GRAVITY, URINE, POC: 1.02 (ref 1–1.03)
URINALYSIS CLARITY, POC: NORMAL
URINALYSIS COLOR, POC: NORMAL
UROBILINOGEN, POC: NORMAL

## 2022-12-21 PROCEDURE — 99214 OFFICE O/P EST MOD 30 MIN: CPT | Performed by: UROLOGY

## 2022-12-21 PROCEDURE — 81003 URINALYSIS AUTO W/O SCOPE: CPT | Performed by: UROLOGY

## 2022-12-21 ASSESSMENT — ENCOUNTER SYMPTOMS
RESPIRATORY NEGATIVE: 1
EYES NEGATIVE: 1

## 2022-12-21 NOTE — PROGRESS NOTES
[facility] Urology  [address] [address]  [phone]    Sallie Greene  : 1960    Chief Complaint   Patient presents with    Prostate Cancer        HPI     Sallie Greene is a 63 y.o. male   Hx of prostate cancer , s/p prostatectomy. TNM CLASSIFICATION: pT3, pN0. Presented with elevated PSA. PSA 7.5 in . No previous values noted. No voiding problems. No family hx of prostate cancer. No weight loss or bone pain . GARFIELD showed no nodules. Repeat PSA 8.5 in . MRI 22:   FINDINGS:   Last PSA: 7.5   Prostate Size: 4.0 x 4.2 x 2.7 cm with a calculated volume of  23.5 mL. PSA Density: 0.32 ng/mL       BPH: No significant BPH. Hemorrhage: None. Peripheral Zone: There is areas of T2 hyperintensity throughout both peripheral   zones. No suspicious lesions are described below. Transition/Central Zone: No suspicious transition zone lesion. Lesion # 1   -Size and location: A 1.5 x 1.3 x 1.2 cm T2 hypointense area within the right   peripheral zone of the gland base (series 4 image 15 and series 6 image 16). -DWI/ADC: Diffusion restriction. -DCE: The lesion demonstrates enhancement.   -Prostate Margin: No evidence of extracapsular extension. -PI-RADS Category: 5       Lesion # 2   -Size and location: A 0.9 x 0.7 cm T2 hypointense area within the left   peripheral zone of the mid gland (series 4 image 16). -DWI/ADC: Mild diffusion restriction. -DCE: No appreciable asymmetric enhancement.   -Prostate Margin: No evidence of extracapsular extension. -PI-RADS Category: 3       Seminal Vesicles: The seminal vesicles are unremarkable. Lymph Nodes: No appreciable pelvic lymphadenopathy. Other: Bilateral fat-containing inguinal hernias. Impression   1. PI-RADS 5 lesion within the right peripheral zone of the gland base,   suspicious for malignancy. 2.  PI-RADS 3 lesion within the left peripheral zone of the mid gland.    3. No evidence fracture capsular extension or pelvic lymphadenopathy. Had MRI fusion prostate biopsy on 8-4-22: PROSTATE VOLUME:  32 gm PSA 8.5  PSAD 0.27  Path: DIAGNOSIS       A:  \"PROSTATE, REGION OF INTEREST #1 RIGHT BASE, BIOPSY\":               PROSTATIC ADENOCARCINOMA, BOB SCORE 4 + 3 = 7 (GRADE GROUP   3),                  INVOLVING <10% OF BIOPSY MATERIAL. B:  \"PROSTATE, REGION OF INTEREST #2 LEFT APEX, BIOPSY\":               FOCUS OF ATYPICAL GLANDS SUSPICIOUS FOR ADENOCARCINOMA. C:  \"PROSTATE, LEFT LATERAL BASE, BIOPSY\":               BENIGN PROSTATE TISSUE. D:  \"PROSTATE, LEFT LATERAL MID, BIOPSY\":               BENIGN PROSTATE TISSUE. E:  \"PROSTATE, LEFT LATERAL APEX, BIOPSY\":               BENIGN PROSTATE TISSUE. F:  \"PROSTATE, LEFT BASE, BIOPSY\":               BENIGN PROSTATE TISSUE. G:  \"PROSTATE, LEFT MID, BIOPSY\":               BENIGN PROSTATE TISSUE. H:  \"PROSTATE, LEFT APEX, BIOPSY\":               PROSTATIC ADENOCARCINOMA, BOB SCORE 3 + 3 = 6 (GRADE GROUP   1),                     INVOLVING LESS THAN 5% OF 1 CORE. I:  \"PROSTATE, RIGHT LATERAL BASE, BIOPSY\":               PROSTATIC ADENOCARCINOMA, BOB SCORE 4 + 3 = 7 (GRADE GROUP   3),                  INVOLVING 20% OF BIOPSY MATERIAL.             J:  \"PROSTATE, RIGHT LATERAL MID, BIOPSY\":               PROSTATIC ADENOCARCINOMA, BOB SCORE 4 + 4 = 8 (GRADE GROUP   4),                  DISCONTINUOUSLY INVOLVING 70% OF 1 CORE.                       K:  \"PROSTATE, RIGHT LATERAL APEX, BIOPSY\":               PROSTATIC ADENOCARCINOMA, BOB SCORE 4 + 3 = 7 (GRADE GROUP   3),                  DISCONTINUOUSLY INVOLVING 30% OF 1 CORE.          L:  \"PROSTATE, RIGHT BASE, BIOPSY\":               PROSTATIC ADENOCARCINOMA, BOB SCORE 3 + 3 = 6 (GRADE GROUP   1),                  INVOLVING <5 % OF 1 CORE.          M:  \"PROSTATE, RIGHT MID, BIOPSY\":             PROSTATIC ADENOCARCINOMA, BOB SCORE 4 + 3 = 7 (GRADE GROUP   3),                  DISCONTINUOUSLY INVOLVING 50% OF 1 CORE.          N:  \"PROSTATE, RIGHT APEX, BIOPSY\":             PROSTATIC ADENOCARCINOMA, BOB SCORE 3 + 4 = 7 (GRADE GROUP   2;                  PERCENT PATTERN 4: <10%), DISCONTINUOUSLY INVOLVING 80% OF        1 CORE. Stage T 1c, Grade 4+4 =8 prostate cancer. Bone scan 8-31-22:   Normal whole body bone scan. No scintigraphic findings present to   suspect osseous metastatic disease. PSMA PET scan 9-2-22:   1. Prostate gland activity closely following findings by MRI with intense focal   activity in the right peripheral zone likely related to the PIRADS category 5   lesion at this level, and mild to moderate activity in the left peripheral zone   likely corresponding to the PIRADS category 3 lesion previously described. 2. No findings concerning for metastatic disease. S/p radical retropubic prostatectomy, bilateral pelvic LND on 10-6-22. Path: DIAGNOSIS        A:  \" RIGHT PELVIC LYMPH NODE\":         TWO LYMPH NODES NEGATIVE FOR METASTATIC TUMOR          B:  \" LEFT PELVIC LYMPH NODE\":         TWO LYMPH NODES NEGATIVE FOR METASTATIC TUMOR          C:  \" APICAL PROSTATE TISSUE\":         PROSTATE NEGATIVE FOR MALIGNANT TUMOR          D:  \" PROSTATE AND SEMINAL VESICLES\":         PROSTATIC ADENOCARCINOMA (BOB 4 + 3 = 7) INVOLVING RIGHT AND   LEFT POSTERIOR LOBES. EXTRACAPSULAR EXTENSION IS FOCALLY PRESENT ON RIGHT   SIDE. LEFT POSTERIOR PROSTATIC MARGIN OF RESECTION IS FOCALLY INVOLVED BY   ADENOCARCINOMA. PROXIMAL AND DISTAL PROSTATIC AND RIGHT POSTERIOR   PROSTATIC MARGINS OF RESECTION ARE NEGATIVE FOR MALIGNANT TUMOR. RIGHT AND   LEFT SEMINAL VESICLES ARE NEGATIVE FOR MALIGNANT TUMOR    Microscopic Description   A:  . Microscopic examination reveals sections of lymph node negative for   metastatic tumor.  Dr. Ade Tavarez concurs   B:  . Microscopic examination reveals changes similar to A. Dr. Lopez massey   C:  . Microscopic examination reveals prostate tissue negative for   malignant tumor. Dr. Aylin massey   D:        PROCEDURE:  prostatectomy   PROSTATE SIZE:  approximate 6.2 x 4.4 x 3.1 cm   PROSTATE WEIGHT:  approximately 34 g   RIGHT POSTERIOR LOBE:  focally involved by adenocarcinoma   LEFT POSTERIOR LOBE:  focally involved by adenocarcinoma   HISTOLOGIC TYPE:  adenocarcinoma   HISTOLOGIC GRADE:          PRIMARY PATTERN: 4          SECONDARY PATTERN: 3          TERTIARY PATTERN: 5          TOTAL thGthRthAthDthEth:th th8th GRADE GROUP: 3   TUMOR QUANTITATION:  tumor involves approximately 25% of the right   posterior lobe submitted and less than 10% of left posterior lobe   submitted   SURGICAL MARGIN STATUS:          APEX:  negative          BLADDER NECK:  negative          RIGHT POSTERIOR PROSTATE MARGIN OF RESECTION:  negative          LEFT POSTERIOR PROSTATE MARGIN OF RESECTION:  tumor appears to   focally involve marked margin over an area of 1-2 mm on more than one   slide   RIGHT SEMINAL VESICLE INVOLVEMENT:  negative   LEFT SEMINAL VESICLE INVOLVEMENT:  negative   EXTENSION TO INVOLVE BLADDER NECK:  focally positive   EXTRACAPSULAR EXTENSION:  focally present on right side   LYMPHOVASCULAR SPACE INVASION:  focally present   TREATMENT EFFECT (RADIATION/HORMONAL):  history of prior treatment is not   received   HIGH GRADE PIN:  focally present   INVOLVEMENT OF PERINEURAL SPACES:  focally present   LYMPH NODE STATUS:         TOTAL NUMBER OF LYMPH NODES EXAMINED: 4         TOTAL NUMBER OF LYMPH NODES CONTAINING METASTATIC CARCINOMA:  0         LOCATION OF POSITIVE NODES:  does not apply   TNM CLASSIFICATION: pT3, pN0. Dr. Aylin massey      CT at [address] shows prepubic hematoma. He seems to be doing well. he is doing Kegels, still has some KARLA. 2 pads/day. PSA 0.4 on 11-4-22, 0.3 on 12-14-22.     Past Medical History:   Diagnosis Date    Bell's palsy 06/2019    Chronic back pain     Elevated PSA May 2022    Hypertension     medication    Personal history of COVID-19 2020    was hospitalized 1 week    Prostate CA ([address] 09/2022     Past Surgical History:   Procedure Laterality Date    BACK SURGERY      OTHER SURGICAL HISTORY      surgery for broken nose    PROSTATE SURGERY N/A 08/04/2022    MRI FUSION GUIDED BIOPSY performed by Murali Zuñiga MD at [facility] MAIN OR    PROSTATECTOMY N/A 10/6/2022    PROSTATECTOMY RADICAL RETROPUBIC, BILATERAL LYMPHADENECTOMY ERAS performed by Murali Zuñiga MD at [facility] MAIN OR     Current Outpatient Medications   Medication Sig Dispense Refill    valsartan (DIOVAN) 80 MG tablet Take 80 mg by mouth daily       No current facility-administered medications for this visit.      No Known Allergies  Social History     Socioeconomic History    Marital status:      Spouse name: Not on file    Number of children: Not on file    Years of education: Not on file    Highest education level: Not on file   Occupational History    Not on file   Tobacco Use    Smoking status: Never    Smokeless tobacco: Never   Vaping Use    Vaping Use: Never used   Substance and Sexual Activity    Alcohol use: Never    Drug use: Never    Sexual activity: Not on file   Other Topics Concern    Not on file   Social History Narrative    Not on file     Social Determinants of Health     Financial Resource Strain: Not on file   Food Insecurity: Not on file   Transportation Needs: Not on file   Physical Activity: Not on file   Stress: Not on file   Social Connections: Not on file   Intimate Partner Violence: Not on file   Housing Stability: Not on file     Family History   Problem Relation Age of Onset    Hypertension Mother     Suicide Father     Cancer Child         testicular       Review of Systems  Constitutional: Negative  Skin: Negative skin ROS  Eyes: Eyes negative  ENT: HENT negative  Respiratory: Respiratory negative  Cardiovascular: Positive for hypertension. GI: Neg GI ROS  Genitourinary: Genitourinary negative  Musculoskeletal: Musculoskeletal negative  Neurological: Neg neuro ROS  Psychological: Neg psych ROS  Endocrine: Endocrine negative  Hem/Lymphatic: Hematologic/lymphatic negative    There were no vitals taken for this visit. Urinalysis  UA - Dipstick  Results for orders placed or performed in visit on 12/21/22   AMB POC URINALYSIS DIP STICK AUTO W/O MICRO   Result Value Ref Range    Color (UA POC)      Clarity (UA POC)      Glucose, Urine, POC Negative Negative    Bilirubin, Urine, POC Negative Negative    KETONES, Urine, POC Negative Negative    Specific Gravity, Urine, POC 1.020 1.001 - 1.035    Blood (UA POC) Negative Negative    pH, Urine, POC 6.0 4.6 - 8.0    Protein, Urine, POC Negative Negative    Urobilinogen, POC Normal     Nitrite, Urine, POC Negative Negative    Leukocyte Esterase, Urine, POC Negative Negative       UA - Micro  WBC - 0  RBC - 0  Bacteria - 0  Epith - 0    Physical Exam    Assessment and Plan    ICD-10-CM    1. Prostate cancer ([facility] [address]  C61 AMB POC URINALYSIS DIP STICK AUTO W/O MICRO     [address] Oncology      PSA is detectable. I recommend referral for salvage radiotherapy. Discussed possible need for ADT, possible side effects of ADT. Discussed possible effect of radiotherapy on his continence. He will continue Kegels. Orders Placed This Encounter   Procedures    [address] [facility] Radiation Oncology     Referral Priority:   Routine     Referral Type:   Eval and Treat     Referral Reason:   Specialty Services Required     Requested Specialty:   Radiation Oncology     Number of Visits Requested:   1    AMB POC URINALYSIS DIP STICK AUTO W/O MICRO       Follow-up and Dispositions    Return in about 3 months (around 3/21/2023).

## 2023-01-10 ENCOUNTER — TELEPHONE (OUTPATIENT)
Dept: UROLOGY | Age: 63
End: 2023-01-10

## 2023-01-10 NOTE — TELEPHONE ENCOUNTER
----- Message from Mendel Malay. Lamb sent at 1/10/2023  4:28 PM EST -----  Regarding: Cancer  Still waiting for an answer to my previous message. Please give me a call. 2 work days have gone by since it was viewed.     Jessika Humphries

## 2023-01-10 NOTE — TELEPHONE ENCOUNTER
Spoke to pt that we would get with Dr. Niko Aviles to see what is going on.  Magdalene Etienne will take messages to him at [facility]

## 2023-01-11 ENCOUNTER — TELEPHONE (OUTPATIENT)
Dept: UROLOGY | Age: 63
End: 2023-01-11

## 2023-01-11 DIAGNOSIS — C61 PROSTATE CANCER (HCC): Primary | ICD-10-CM

## 2023-01-11 NOTE — TELEPHONE ENCOUNTER
Couldn't get timely appt at radiation oncology because of physician turnover/absence. Will refer to Cherry Ryan or Gordon Ledbetter. Diagnosis Orders   1.  Prostate cancer [facility])  External Referral to Radiation Oncology

## 2023-01-17 ENCOUNTER — OFFICE VISIT (OUTPATIENT)
Dept: INTERNAL MEDICINE CLINIC | Facility: CLINIC | Age: 63
End: 2023-01-17
Payer: COMMERCIAL

## 2023-01-17 VITALS
HEIGHT: 70 IN | RESPIRATION RATE: 16 BRPM | BODY MASS INDEX: 32.9 KG/M2 | SYSTOLIC BLOOD PRESSURE: 126 MMHG | HEART RATE: 74 BPM | WEIGHT: 229.8 LBS | DIASTOLIC BLOOD PRESSURE: 84 MMHG | TEMPERATURE: 97.4 F

## 2023-01-17 DIAGNOSIS — C61 PROSTATE CANCER (HCC): ICD-10-CM

## 2023-01-17 DIAGNOSIS — R53.82 CHRONIC FATIGUE: ICD-10-CM

## 2023-01-17 DIAGNOSIS — I10 ESSENTIAL (PRIMARY) HYPERTENSION: Primary | ICD-10-CM

## 2023-01-17 PROCEDURE — 3074F SYST BP LT 130 MM HG: CPT | Performed by: INTERNAL MEDICINE

## 2023-01-17 PROCEDURE — 99213 OFFICE O/P EST LOW 20 MIN: CPT | Performed by: INTERNAL MEDICINE

## 2023-01-17 PROCEDURE — 3079F DIAST BP 80-89 MM HG: CPT | Performed by: INTERNAL MEDICINE

## 2023-01-17 SDOH — ECONOMIC STABILITY: FOOD INSECURITY: WITHIN THE PAST 12 MONTHS, THE FOOD YOU BOUGHT JUST DIDN'T LAST AND YOU DIDN'T HAVE MONEY TO GET MORE.: NEVER TRUE

## 2023-01-17 SDOH — ECONOMIC STABILITY: FOOD INSECURITY: WITHIN THE PAST 12 MONTHS, YOU WORRIED THAT YOUR FOOD WOULD RUN OUT BEFORE YOU GOT MONEY TO BUY MORE.: NEVER TRUE

## 2023-01-17 ASSESSMENT — PATIENT HEALTH QUESTIONNAIRE - PHQ9
1. LITTLE INTEREST OR PLEASURE IN DOING THINGS: 0
SUM OF ALL RESPONSES TO PHQ QUESTIONS 1-9: 0
SUM OF ALL RESPONSES TO PHQ QUESTIONS 1-9: 0
SUM OF ALL RESPONSES TO PHQ9 QUESTIONS 1 & 2: 0
SUM OF ALL RESPONSES TO PHQ QUESTIONS 1-9: 0
SUM OF ALL RESPONSES TO PHQ QUESTIONS 1-9: 0
2. FEELING DOWN, DEPRESSED OR HOPELESS: 0

## 2023-01-17 ASSESSMENT — SOCIAL DETERMINANTS OF HEALTH (SDOH): HOW HARD IS IT FOR YOU TO PAY FOR THE VERY BASICS LIKE FOOD, HOUSING, MEDICAL CARE, AND HEATING?: NOT HARD AT ALL

## 2023-01-17 NOTE — PROGRESS NOTES
1/17/2023 10:34 AM  Location:[facility] [address] [facility] MEDICINE  [place]  Patient #:  [id number]  YOB: 1960          YOUR LAST HEMOGLOBIN A1CS:   No results found for: HBA1C, [id number]    YOUR LAST LIPID PROFILE:   Lab Results   Component Value Date/Time    CHOL 133 04/27/2022 09:56 AM    HDL 42 04/27/2022 09:56 AM    VLDL 17 04/27/2022 09:56 AM         Lab Results   Component Value Date/Time    GFRAA 116 01/06/2021 09:43 AM    BUN 17 10/08/2022 05:47 AM     10/08/2022 05:47 AM    K 4.0 10/08/2022 05:47 AM     10/08/2022 05:47 AM    CO2 28 10/08/2022 05:47 AM           History of Present Illness     Chief Complaint   Patient presents with    6 Month Follow-Up     Pt presents to the office today for a 6 month follow-up       This patient is status post prostatectomy and has had evidence of recurrence. He is scheduled for evaluation for radiation therapy at the [facility] in [place].     Mr. Sally Navarro is a 63 y.o. male  who presents for office visit      No Known Allergies  Past Medical History:   Diagnosis Date    Bell's palsy 06/2019    Chronic back pain     Elevated PSA May 2022    Hypertension     medication    Personal history of COVID-19 2020    was hospitalized 1 week    Prostate CA ([address] 09/2022     Social History     Socioeconomic History    Marital status:      Spouse name: None    Number of children: None    Years of education: None    Highest education level: None   Tobacco Use    Smoking status: Never    Smokeless tobacco: Never   Vaping Use    Vaping Use: Never used   Substance and Sexual Activity    Alcohol use: Never    Drug use: Never     Social Determinants of Health     Financial Resource Strain: Low Risk     Difficulty of Paying Living Expenses: Not hard at all   Food Insecurity: No Food Insecurity    Worried About Running Out of Food in the Last Year: Never true    Ran Out of Food in the Last Year: Never true     Past Surgical History:   Procedure Laterality Date    BACK SURGERY      OTHER SURGICAL HISTORY      surgery for broken nose    PROSTATE SURGERY N/A 08/04/2022    MRI FUSION GUIDED BIOPSY performed by Estela Tolbert MD at [facility] MAIN OR    PROSTATECTOMY N/A 10/6/2022    PROSTATECTOMY RADICAL RETROPUBIC, BILATERAL LYMPHADENECTOMY ERAS performed by Estela Tolbert MD at [facility] MAIN OR     Current Outpatient Medications   Medication Sig Dispense Refill    valsartan (DIOVAN) 80 MG tablet Take 80 mg by mouth daily       No current facility-administered medications for this visit. Health Maintenance   Topic Date Due    HIV screen  Never done    Hepatitis C screen  Never done    Shingles vaccine (1 of 2) Never done    DTaP/Tdap/Td vaccine (1 - Tdap) 01/02/2017    Depression Screen  10/28/2022    Flu vaccine (1) 01/17/2024 (Originally 8/1/2022)    COVID-19 Vaccine (1) 01/17/2024 (Originally 1960)    Prostate Specific Antigen (PSA) Screening or Monitoring  12/14/2023    Colorectal Cancer Screen  06/03/2024    Lipids  04/27/2027    Hepatitis A vaccine  Aged Out    Hib vaccine  Aged Out    Meningococcal (ACWY) vaccine  Aged Out    Pneumococcal 0-64 years Vaccine  Aged Out     Family History   Problem Relation Age of Onset    Hypertension Mother     Suicide Father     Cancer Child         testicular             Review of Systems  Review of Systems    /84 (Site: Left Upper Arm, Position: Sitting, Cuff Size: Large Adult)   Pulse 74   Temp 97.4 °F (36.3 °C) (Temporal)   Resp 16   Ht 5' 10\" (1.778 m)   Wt 229 lb 12.8 oz (104.2 kg)   BMI 32.97 kg/m²       Physical Exam    Physical Exam  Constitutional:       General: He is not in acute distress. Appearance: Normal appearance. He is not ill-appearing. HENT:      Head: Normocephalic and atraumatic. Eyes:      Extraocular Movements: Extraocular movements intact. Pupils: Pupils are equal, round, and reactive to light.    Cardiovascular:      Rate and Rhythm: Normal rate and regular rhythm. Pulmonary:      Effort: Pulmonary effort is normal.      Breath sounds: Normal breath sounds. Skin:     General: Skin is warm. Neurological:      Mental Status: He is alert. Motor: No weakness. Psychiatric:         Mood and Affect: Mood normal.         Behavior: Behavior normal.         Thought Content: Thought content normal.         Judgment: Judgment normal.       Assessment & Plan    Encounter Diagnoses   Name Primary? Essential (primary) hypertension Yes    Prostate cancer (HCC)     Comment: 7 eleazar score, 0.3 post radiation     Chronic fatigue        Current Outpatient Medications   Medication Sig Dispense Refill    valsartan (DIOVAN) 80 MG tablet Take 80 mg by mouth daily       No current facility-administered medications for this visit. Orders Placed This Encounter   Procedures    Basic Metabolic Panel     Standing Status:   Future     Standing Expiration Date:   1/17/2024       There are no discontinued medications. 1. Essential (primary) hypertension  Controlled  - Basic Metabolic Panel; Future    2. Prostate cancer [facility])  Followed by urology and now radiation oncology expecting to begin radiation therapy    3. Chronic fatigue         No follow-up provider specified.         Kartik Alavrez MD

## 2023-01-18 LAB
ANION GAP SERPL CALC-SCNC: 5 MMOL/L (ref 2–11)
BUN SERPL-MCNC: 13 MG/DL (ref 8–23)
CALCIUM SERPL-MCNC: 9.2 MG/DL (ref 8.3–10.4)
CHLORIDE SERPL-SCNC: 108 MMOL/L (ref 101–110)
CO2 SERPL-SCNC: 28 MMOL/L (ref 21–32)
CREAT SERPL-MCNC: 1 MG/DL (ref 0.8–1.5)
GLUCOSE SERPL-MCNC: 99 MG/DL (ref 65–100)
POTASSIUM SERPL-SCNC: 5.1 MMOL/L (ref 3.5–5.1)
SODIUM SERPL-SCNC: 141 MMOL/L (ref 133–143)

## 2023-03-07 RX ORDER — VALSARTAN 80 MG/1
TABLET ORAL
Qty: 90 TABLET | Refills: 3 | Status: SHIPPED | OUTPATIENT
Start: 2023-03-07

## 2023-03-23 ENCOUNTER — TELEPHONE (OUTPATIENT)
Dept: UROLOGY | Age: 63
End: 2023-03-23

## 2023-10-13 ENCOUNTER — OFFICE VISIT (OUTPATIENT)
Dept: INTERNAL MEDICINE CLINIC | Facility: CLINIC | Age: 63
End: 2023-10-13
Payer: COMMERCIAL

## 2023-10-13 VITALS
TEMPERATURE: 97.9 F | SYSTOLIC BLOOD PRESSURE: 120 MMHG | RESPIRATION RATE: 16 BRPM | BODY MASS INDEX: 33.3 KG/M2 | DIASTOLIC BLOOD PRESSURE: 89 MMHG | HEIGHT: 70 IN | HEART RATE: 84 BPM | WEIGHT: 232.6 LBS

## 2023-10-13 DIAGNOSIS — C61 PROSTATE CANCER (HCC): ICD-10-CM

## 2023-10-13 DIAGNOSIS — I10 ESSENTIAL (PRIMARY) HYPERTENSION: Primary | ICD-10-CM

## 2023-10-13 DIAGNOSIS — R53.82 CHRONIC FATIGUE: ICD-10-CM

## 2023-10-13 DIAGNOSIS — M54.50 CHRONIC MIDLINE LOW BACK PAIN WITHOUT SCIATICA: ICD-10-CM

## 2023-10-13 DIAGNOSIS — G89.29 CHRONIC MIDLINE LOW BACK PAIN WITHOUT SCIATICA: ICD-10-CM

## 2023-10-13 PROCEDURE — 3074F SYST BP LT 130 MM HG: CPT | Performed by: INTERNAL MEDICINE

## 2023-10-13 PROCEDURE — 3079F DIAST BP 80-89 MM HG: CPT | Performed by: INTERNAL MEDICINE

## 2023-10-13 PROCEDURE — 99213 OFFICE O/P EST LOW 20 MIN: CPT | Performed by: INTERNAL MEDICINE

## 2023-10-13 RX ORDER — PREDNISONE 5 MG/1
5 TABLET ORAL DAILY
COMMUNITY
Start: 2023-10-07

## 2023-10-13 RX ORDER — ABIRATERONE 500 MG/1
1 TABLET ORAL DAILY
COMMUNITY

## 2023-10-13 RX ORDER — ACETAMINOPHEN 160 MG
1 TABLET,DISINTEGRATING ORAL DAILY
COMMUNITY

## 2023-10-13 RX ORDER — GRAPE SEED EXT/BIOFLAV,CITRUS 50MG-250MG
1 CAPSULE ORAL DAILY
COMMUNITY

## 2023-10-13 SDOH — ECONOMIC STABILITY: FOOD INSECURITY: WITHIN THE PAST 12 MONTHS, YOU WORRIED THAT YOUR FOOD WOULD RUN OUT BEFORE YOU GOT MONEY TO BUY MORE.: NEVER TRUE

## 2023-10-13 SDOH — ECONOMIC STABILITY: HOUSING INSECURITY
IN THE LAST 12 MONTHS, WAS THERE A TIME WHEN YOU DID NOT HAVE A STEADY PLACE TO SLEEP OR SLEPT IN A SHELTER (INCLUDING NOW)?: NO

## 2023-10-13 SDOH — ECONOMIC STABILITY: FOOD INSECURITY: WITHIN THE PAST 12 MONTHS, THE FOOD YOU BOUGHT JUST DIDN'T LAST AND YOU DIDN'T HAVE MONEY TO GET MORE.: NEVER TRUE

## 2023-10-13 SDOH — ECONOMIC STABILITY: INCOME INSECURITY: HOW HARD IS IT FOR YOU TO PAY FOR THE VERY BASICS LIKE FOOD, HOUSING, MEDICAL CARE, AND HEATING?: NOT HARD AT ALL

## 2024-03-11 ENCOUNTER — TELEPHONE (OUTPATIENT)
Dept: UROLOGY | Age: 64
End: 2024-03-11

## 2024-03-11 ENCOUNTER — OFFICE VISIT (OUTPATIENT)
Dept: UROLOGY | Age: 64
End: 2024-03-11
Payer: COMMERCIAL

## 2024-03-11 DIAGNOSIS — C61 PROSTATE CANCER (HCC): Primary | ICD-10-CM

## 2024-03-11 DIAGNOSIS — T14.8XXA HEMATOMA: ICD-10-CM

## 2024-03-11 DIAGNOSIS — N39.3 MALE STRESS INCONTINENCE: ICD-10-CM

## 2024-03-11 DIAGNOSIS — R97.20 ELEVATED PSA: ICD-10-CM

## 2024-03-11 LAB
BILIRUBIN, URINE, POC: NEGATIVE
BLOOD URINE, POC: NORMAL
GLUCOSE URINE, POC: NEGATIVE
KETONES, URINE, POC: NEGATIVE
LEUKOCYTE ESTERASE, URINE, POC: NEGATIVE
NITRITE, URINE, POC: NEGATIVE
PH, URINE, POC: 5.5 (ref 4.6–8)
PROTEIN,URINE, POC: NEGATIVE
SPECIFIC GRAVITY, URINE, POC: 1.03 (ref 1–1.03)
URINALYSIS CLARITY, POC: NORMAL
URINALYSIS COLOR, POC: NORMAL
UROBILINOGEN, POC: NORMAL

## 2024-03-11 PROCEDURE — 99214 OFFICE O/P EST MOD 30 MIN: CPT | Performed by: UROLOGY

## 2024-03-11 PROCEDURE — 81003 URINALYSIS AUTO W/O SCOPE: CPT | Performed by: UROLOGY

## 2024-03-11 ASSESSMENT — ENCOUNTER SYMPTOMS
NAUSEA: 0
BACK PAIN: 0

## 2024-03-11 NOTE — PROGRESS NOTES
changes similar to A. Dr. César massey   C:  . Microscopic examination reveals prostate tissue negative for   malignant tumor. Dr. César massey   D:        PROCEDURE:  prostatectomy   PROSTATE SIZE:  approximate 6.2 x 4.4 x 3.1 cm   PROSTATE WEIGHT:  approximately 34 g   RIGHT POSTERIOR LOBE:  focally involved by adenocarcinoma   LEFT POSTERIOR LOBE:  focally involved by adenocarcinoma   HISTOLOGIC TYPE:  adenocarcinoma   HISTOLOGIC GRADE:          PRIMARY PATTERN: 4          SECONDARY PATTERN: 3          TERTIARY PATTERN: 5          TOTAL thGthRthAthDthEth:th th6th GRADE GROUP: 3   TUMOR QUANTITATION:  tumor involves approximately 25% of the right   posterior lobe submitted and less than 10% of left posterior lobe   submitted   SURGICAL MARGIN STATUS:          APEX:  negative          BLADDER NECK:  negative          RIGHT POSTERIOR PROSTATE MARGIN OF RESECTION:  negative          LEFT POSTERIOR PROSTATE MARGIN OF RESECTION:  tumor appears to   focally involve marked margin over an area of 1-2 mm on more than one   slide   RIGHT SEMINAL VESICLE INVOLVEMENT:  negative   LEFT SEMINAL VESICLE INVOLVEMENT:  negative   EXTENSION TO INVOLVE BLADDER NECK:  focally positive   EXTRACAPSULAR EXTENSION:  focally present on right side   LYMPHOVASCULAR SPACE INVASION:  focally present   TREATMENT EFFECT (RADIATION/HORMONAL):  history of prior treatment is not   received   HIGH GRADE PIN:  focally present   INVOLVEMENT OF PERINEURAL SPACES:  focally present   LYMPH NODE STATUS:         TOTAL NUMBER OF LYMPH NODES EXAMINED: 4         TOTAL NUMBER OF LYMPH NODES CONTAINING METASTATIC CARCINOMA:  0         LOCATION OF POSITIVE NODES:  does not apply   TNM CLASSIFICATION: pT3, pN0.    Dr. César massey      CT at [facility] shows prepubic hematoma.   He seems to be doing well. he is doing Kegels, still has some KARLA. 2 pads/day.   PSA 0.4 on 11-4-22, 0.3 on 12-14-22.  Was referred to [facility] for salvage

## 2024-04-05 ENCOUNTER — PREP FOR PROCEDURE (OUTPATIENT)
Dept: UROLOGY | Age: 64
End: 2024-04-05

## 2024-04-05 ENCOUNTER — PROCEDURE VISIT (OUTPATIENT)
Dept: UROLOGY | Age: 64
End: 2024-04-05
Payer: COMMERCIAL

## 2024-04-05 ENCOUNTER — TELEPHONE (OUTPATIENT)
Dept: UROLOGY | Age: 64
End: 2024-04-05

## 2024-04-05 DIAGNOSIS — N39.3 MALE STRESS INCONTINENCE: Primary | ICD-10-CM

## 2024-04-05 DIAGNOSIS — N39.3 MALE URINARY STRESS INCONTINENCE: ICD-10-CM

## 2024-04-05 DIAGNOSIS — C61 PROSTATE CANCER (HCC): ICD-10-CM

## 2024-04-05 LAB
BILIRUBIN, URINE, POC: NORMAL
BLOOD URINE, POC: NORMAL
GLUCOSE URINE, POC: NEGATIVE
KETONES, URINE, POC: NORMAL
LEUKOCYTE ESTERASE, URINE, POC: NEGATIVE
NITRITE, URINE, POC: NEGATIVE
PH, URINE, POC: 6 (ref 4.6–8)
PROTEIN,URINE, POC: NEGATIVE
PVR, POC: 97 CC
SPECIFIC GRAVITY, URINE, POC: >=1.03 (ref 1–1.03)
URINALYSIS CLARITY, POC: NORMAL
URINALYSIS COLOR, POC: NORMAL
UROBILINOGEN, POC: NORMAL

## 2024-04-05 PROCEDURE — 51798 US URINE CAPACITY MEASURE: CPT | Performed by: UROLOGY

## 2024-04-05 PROCEDURE — 52000 CYSTOURETHROSCOPY: CPT | Performed by: UROLOGY

## 2024-04-05 PROCEDURE — 81003 URINALYSIS AUTO W/O SCOPE: CPT | Performed by: UROLOGY

## 2024-04-05 RX ORDER — SODIUM CHLORIDE 0.9 % (FLUSH) 0.9 %
5-40 SYRINGE (ML) INJECTION EVERY 12 HOURS SCHEDULED
Status: CANCELLED | OUTPATIENT
Start: 2024-04-05

## 2024-04-05 RX ORDER — SODIUM CHLORIDE 9 MG/ML
INJECTION, SOLUTION INTRAVENOUS PRN
Status: CANCELLED | OUTPATIENT
Start: 2024-04-05

## 2024-04-05 RX ORDER — SODIUM CHLORIDE 0.9 % (FLUSH) 0.9 %
5-40 SYRINGE (ML) INJECTION PRN
Status: CANCELLED | OUTPATIENT
Start: 2024-04-05

## 2024-04-05 RX ORDER — SULFAMETHOXAZOLE AND TRIMETHOPRIM 800; 160 MG/1; MG/1
1 TABLET ORAL 2 TIMES DAILY
Qty: 40 TABLET | Refills: 0 | Status: SHIPPED | OUTPATIENT
Start: 2024-04-05 | End: 2024-04-25

## 2024-04-05 RX ORDER — CIPROFLOXACIN 500 MG/1
500 TABLET, FILM COATED ORAL 2 TIMES DAILY
Qty: 40 TABLET | Refills: 0 | Status: SHIPPED | OUTPATIENT
Start: 2024-04-05 | End: 2024-04-25

## 2024-04-05 NOTE — TELEPHONE ENCOUNTER
----- Message from Minh Guido Jr., MD sent at 4/5/2024  9:14 AM EDT -----  Schedule placement of artificial urinary sphincter.   Need rep  Did orders

## 2024-04-05 NOTE — PROGRESS NOTES
ADENOCARCINOMA, BOB SCORE 4 + 3 = 7 (GRADE GROUP   3),                  DISCONTINUOUSLY INVOLVING 50% OF 1 CORE.          N:  \"PROSTATE, RIGHT APEX, BIOPSY\":             PROSTATIC ADENOCARCINOMA, BOB SCORE 3 + 4 = 7 (GRADE GROUP   2;                  PERCENT PATTERN 4: <10%), DISCONTINUOUSLY INVOLVING 80% OF        1 CORE.       Stage T 1c, Grade 4+4 =8 prostate cancer.   Bone scan 8-31-22:   Normal whole body bone scan. No scintigraphic findings present to   suspect osseous metastatic disease.   PSMA PET scan 9-2-22:   1. Prostate gland activity closely following findings by MRI with intense focal   activity in the right peripheral zone likely related to the PIRADS category 5   lesion at this level, and mild to moderate activity in the left peripheral zone   likely corresponding to the PIRADS category 3 lesion previously described.       2. No findings concerning for metastatic disease.          S/p radical retropubic prostatectomy, bilateral pelvic LND on 10-6-22. Path: DIAGNOSIS        A:  \" RIGHT PELVIC LYMPH NODE\":         TWO LYMPH NODES NEGATIVE FOR METASTATIC TUMOR          B:  \" LEFT PELVIC LYMPH NODE\":         TWO LYMPH NODES NEGATIVE FOR METASTATIC TUMOR          C:  \" APICAL PROSTATE TISSUE\":         PROSTATE NEGATIVE FOR MALIGNANT TUMOR          D:  \" PROSTATE AND SEMINAL VESICLES\":         PROSTATIC ADENOCARCINOMA (BOB 4 + 3 = 7) INVOLVING RIGHT AND   LEFT POSTERIOR LOBES. EXTRACAPSULAR EXTENSION IS FOCALLY PRESENT ON RIGHT   SIDE. LEFT POSTERIOR PROSTATIC MARGIN OF RESECTION IS FOCALLY INVOLVED BY   ADENOCARCINOMA. PROXIMAL AND DISTAL PROSTATIC AND RIGHT POSTERIOR   PROSTATIC MARGINS OF RESECTION ARE NEGATIVE FOR MALIGNANT TUMOR. RIGHT AND   LEFT SEMINAL VESICLES ARE NEGATIVE FOR MALIGNANT TUMOR    Microscopic Description   A:  . Microscopic examination reveals sections of lymph node negative for   metastatic tumor. Dr. César Liang concurs   B:  . Microscopic examination reveals changes

## 2024-04-10 ASSESSMENT — PATIENT HEALTH QUESTIONNAIRE - PHQ9
SUM OF ALL RESPONSES TO PHQ QUESTIONS 1-9: 0
SUM OF ALL RESPONSES TO PHQ QUESTIONS 1-9: 0
SUM OF ALL RESPONSES TO PHQ9 QUESTIONS 1 & 2: 0
2. FEELING DOWN, DEPRESSED OR HOPELESS: NOT AT ALL
SUM OF ALL RESPONSES TO PHQ QUESTIONS 1-9: 0
1. LITTLE INTEREST OR PLEASURE IN DOING THINGS: NOT AT ALL
2. FEELING DOWN, DEPRESSED OR HOPELESS: NOT AT ALL
SUM OF ALL RESPONSES TO PHQ QUESTIONS 1-9: 0
1. LITTLE INTEREST OR PLEASURE IN DOING THINGS: NOT AT ALL
SUM OF ALL RESPONSES TO PHQ9 QUESTIONS 1 & 2: 0

## 2024-04-11 ENCOUNTER — OFFICE VISIT (OUTPATIENT)
Dept: INTERNAL MEDICINE CLINIC | Facility: CLINIC | Age: 64
End: 2024-04-11
Payer: COMMERCIAL

## 2024-04-11 VITALS
DIASTOLIC BLOOD PRESSURE: 82 MMHG | HEIGHT: 70 IN | WEIGHT: 227.4 LBS | SYSTOLIC BLOOD PRESSURE: 132 MMHG | BODY MASS INDEX: 32.56 KG/M2 | TEMPERATURE: 97 F | RESPIRATION RATE: 16 BRPM | HEART RATE: 79 BPM

## 2024-04-11 DIAGNOSIS — I10 ESSENTIAL (PRIMARY) HYPERTENSION: Primary | ICD-10-CM

## 2024-04-11 DIAGNOSIS — C61 PROSTATE CANCER (HCC): ICD-10-CM

## 2024-04-11 DIAGNOSIS — R53.82 CHRONIC FATIGUE, UNSPECIFIED: ICD-10-CM

## 2024-04-11 DIAGNOSIS — D12.4 ADENOMATOUS POLYP OF DESCENDING COLON: ICD-10-CM

## 2024-04-11 PROCEDURE — 3079F DIAST BP 80-89 MM HG: CPT | Performed by: INTERNAL MEDICINE

## 2024-04-11 PROCEDURE — 99213 OFFICE O/P EST LOW 20 MIN: CPT | Performed by: INTERNAL MEDICINE

## 2024-04-11 PROCEDURE — 3075F SYST BP GE 130 - 139MM HG: CPT | Performed by: INTERNAL MEDICINE

## 2024-04-11 RX ORDER — VALSARTAN 80 MG/1
80 TABLET ORAL DAILY
Qty: 90 TABLET | Refills: 3 | Status: SHIPPED | OUTPATIENT
Start: 2024-04-11

## 2024-04-11 NOTE — PROGRESS NOTES
mouth daily 90 tablet 3   • predniSONE (DELTASONE) 5 MG tablet Take 1 tablet by mouth daily     • Abiraterone Acetate (ZYTIGA) 500 MG TABS Take 1 tablet by mouth daily     • Calcium Carbonate (CALCIUM 600 PO) Take 1 tablet by mouth daily     • Cholecalciferol (VITAMIN D3) 50 MCG (2000 UT) CAPS Take 1 capsule by mouth daily     • Black Cohosh 540 MG CAPS Take 1 capsule by mouth daily     • sulfamethoxazole-trimethoprim (BACTRIM DS) 800-160 MG per tablet Take 1 tablet by mouth 2 times daily for 20 days Begin taking 3 days before procedure (Patient not taking: Reported on 4/11/2024) 40 tablet 0   • ciprofloxacin (CIPRO) 500 MG tablet Take 1 tablet by mouth 2 times daily for 20 days Begin taking 3 days before procedure (Patient not taking: Reported on 4/11/2024) 40 tablet 0     No current facility-administered medications for this visit.       No orders of the defined types were placed in this encounter.      Medications Discontinued During This Encounter   Medication Reason   • valsartan (DIOVAN) 80 MG tablet REORDER       1. Essential (primary) hypertension  Controlled    2. Prostate cancer (HCC)  Still being treated hopefully will be taken off chemotherapy and steroids soon    3. Chronic fatigue, unspecified       4. Adenomatous polyp of descending colon  Due for colonoscopy       Return in about 6 months (around 10/11/2024).         Tj Coburn MD

## 2024-04-17 NOTE — TELEPHONE ENCOUNTER
Called and LVMM for the pt to return call to schedule.  Tentative hold placed on schedule for 5/16/24.

## 2024-04-18 PROBLEM — N39.3 MALE URINARY STRESS INCONTINENCE: Status: ACTIVE | Noted: 2024-04-05

## 2024-04-18 NOTE — TELEPHONE ENCOUNTER
Procedures: Procedure(s):   PLACEMENT OF ARTIFICIAL URINARY SPHINCTER   Date: 5/16/2024   Time: 1337   Location: [facility] MAIN OR 02     Scheduled.

## 2024-05-09 ENCOUNTER — HOSPITAL ENCOUNTER (OUTPATIENT)
Dept: SURGERY | Age: 64
Discharge: HOME | End: 2024-05-09
Payer: COMMERCIAL

## 2024-05-09 VITALS
DIASTOLIC BLOOD PRESSURE: 87 MMHG | SYSTOLIC BLOOD PRESSURE: 143 MMHG | HEIGHT: 70 IN | TEMPERATURE: 97.3 F | OXYGEN SATURATION: 97 % | HEART RATE: 94 BPM | WEIGHT: 226 LBS | BODY MASS INDEX: 32.35 KG/M2 | RESPIRATION RATE: 16 BRPM

## 2024-05-09 DIAGNOSIS — N39.3 MALE STRESS INCONTINENCE: ICD-10-CM

## 2024-05-09 LAB
ANION GAP SERPL CALC-SCNC: 11 MMOL/L (ref 9–18)
APPEARANCE UR: CLEAR
BACTERIA URNS QL MICRO: 0 /HPF
BILIRUB UR QL: NEGATIVE
BUN SERPL-MCNC: 12 MG/DL (ref 8–23)
CALCIUM SERPL-MCNC: 9.1 MG/DL (ref 8.8–10.2)
CASTS URNS QL MICRO: 0 /LPF
CHLORIDE SERPL-SCNC: 104 MMOL/L (ref 98–107)
CO2 SERPL-SCNC: 25 MMOL/L (ref 20–28)
COLOR UR: YELLOW
CREAT SERPL-MCNC: 0.83 MG/DL (ref 0.8–1.3)
CRYSTALS URNS QL MICRO: NORMAL /LPF
EPI CELLS #/AREA URNS HPF: 0 /HPF
ERYTHROCYTE [DISTWIDTH] IN BLOOD BY AUTOMATED COUNT: 13.2 % (ref 11.9–14.6)
GLUCOSE SERPL-MCNC: 113 MG/DL (ref 70–99)
HCT VFR BLD AUTO: 40.5 % (ref 41.1–50.3)
HGB BLD-MCNC: 13.6 G/DL (ref 13.6–17.2)
HGB UR QL STRIP: ABNORMAL
KETONES UR QL STRIP.AUTO: ABNORMAL MG/DL
LEUKOCYTE ESTERASE UR QL STRIP.AUTO: NEGATIVE
MCH RBC QN AUTO: 30.2 PG (ref 26.1–32.9)
MCHC RBC AUTO-ENTMCNC: 33.6 G/DL (ref 31.4–35)
MCV RBC AUTO: 89.8 FL (ref 82–102)
MUCOUS THREADS URNS QL MICRO: NORMAL /LPF
NITRITE UR QL STRIP.AUTO: NEGATIVE
NRBC # BLD: 0 K/UL (ref 0–0.2)
OTHER OBSERVATIONS: NORMAL
PH UR STRIP: 5 (ref 5–9)
PLATELET # BLD AUTO: 336 K/UL (ref 150–450)
PMV BLD AUTO: 8.9 FL (ref 9.4–12.3)
POTASSIUM SERPL-SCNC: 3.5 MMOL/L (ref 3.5–5.1)
PROT UR STRIP-MCNC: NEGATIVE MG/DL
RBC # BLD AUTO: 4.51 M/UL (ref 4.23–5.6)
RBC #/AREA URNS HPF: 0 /HPF
SODIUM SERPL-SCNC: 140 MMOL/L (ref 136–145)
SP GR UR REFRACTOMETRY: 1.02 (ref 1–1.02)
UROBILINOGEN UR QL STRIP.AUTO: 0.2 EU/DL (ref 0.2–1)
WBC # BLD AUTO: 5.1 K/UL (ref 4.3–11.1)
WBC URNS QL MICRO: NORMAL /HPF

## 2024-05-09 PROCEDURE — 85027 COMPLETE CBC AUTOMATED: CPT

## 2024-05-09 PROCEDURE — 80048 BASIC METABOLIC PNL TOTAL CA: CPT

## 2024-05-09 PROCEDURE — 87086 URINE CULTURE/COLONY COUNT: CPT

## 2024-05-09 PROCEDURE — 81001 URINALYSIS AUTO W/SCOPE: CPT

## 2024-05-09 RX ORDER — DULOXETIN HYDROCHLORIDE 30 MG/1
30 CAPSULE, DELAYED RELEASE ORAL DAILY
COMMUNITY
Start: 2024-03-15

## 2024-05-09 NOTE — PROGRESS NOTES
Patient verified name and     Order for consent found in EHR and matches case posting; patient verified.     Type 1B surgery, Walk-in assessment complete.    Labs per surgeon: U/A, UCX, CBC, BMP; results pending  Labs per anesthesia protocol: No additional.  EKG: Not required per anesthesia protocol        Patient provided with and instructed on educational handouts including Guide to Surgery, Preventing Surgical Site Infections, Pain Management, and [place] [facility] Brochure.    Patient answered medical/surgical history questions at their best of ability. All prior to admission medications documented in EPIC. Original medication prescription bottle visualized during patient appointment.     Patient instructed to hold all vitamins 7 days prior to surgery and NSAIDS 5 days prior to surgery, patient verbalized understanding.     Patient teach back successful and patient demonstrates knowledge of instructions.     PLEASE CONTINUE TAKING ALL PRESCRIPTION MEDICATIONS UP TO THE DAY OF SURGERY UNLESS OTHERWISE DIRECTED BELOW. You may take Tylenol, allergy,  and/or indigestion medications.     TAKE ONLY THESE MEDICATIONS ON THE DAY OF SURGERY   Duloxetine (Cymbalta)      Abiraterone (Zytega)   Prednisone      DISCONTINUE all vitamins and supplements 7 days prior to surgery. DISCONTINUE Non-Steroidal Anti-Inflammatory (NSAIDS) such as Advil and Aleve 5 days prior to surgery.     Home Medications to Hold- please continue all other medications except these.            Comments   Dorie-Hex shower the night before and morning of surgery.    On the day before surgery please take 2 Tylenol in the morning and then again before bed. You may use either regular or extra strength.           Please do not bring home medications with you on the day of surgery unless otherwise directed by your nurse.  If you are instructed to bring home medications, please give them to your nurse as they will be administered by the nursing

## 2024-05-11 LAB
BACTERIA SPEC CULT: NORMAL
SERVICE CMNT-IMP: NORMAL

## 2024-05-15 ENCOUNTER — ANESTHESIA EVENT (OUTPATIENT)
Dept: SURGERY | Age: 64
End: 2024-05-15
Payer: COMMERCIAL

## 2024-05-16 ENCOUNTER — ANESTHESIA (OUTPATIENT)
Dept: SURGERY | Age: 64
End: 2024-05-16
Payer: COMMERCIAL

## 2024-05-16 ENCOUNTER — HOSPITAL ENCOUNTER (OUTPATIENT)
Age: 64
Setting detail: OUTPATIENT SURGERY
Discharge: HOME | End: 2024-05-16
Attending: UROLOGY | Admitting: UROLOGY
Payer: COMMERCIAL

## 2024-05-16 VITALS
RESPIRATION RATE: 16 BRPM | WEIGHT: 221.8 LBS | HEIGHT: 70 IN | TEMPERATURE: 98 F | OXYGEN SATURATION: 93 % | DIASTOLIC BLOOD PRESSURE: 76 MMHG | SYSTOLIC BLOOD PRESSURE: 117 MMHG | HEART RATE: 103 BPM | BODY MASS INDEX: 31.75 KG/M2

## 2024-05-16 DIAGNOSIS — N39.3 MALE URINARY STRESS INCONTINENCE: Primary | ICD-10-CM

## 2024-05-16 PROCEDURE — 2500000003 HC RX 250 WO HCPCS

## 2024-05-16 PROCEDURE — 7100000011 HC PHASE II RECOVERY - ADDTL 15 MIN: Performed by: UROLOGY

## 2024-05-16 PROCEDURE — 7100000010 HC PHASE II RECOVERY - FIRST 15 MIN: Performed by: UROLOGY

## 2024-05-16 PROCEDURE — 6370000000 HC RX 637 (ALT 250 FOR IP): Performed by: ANESTHESIOLOGY

## 2024-05-16 PROCEDURE — 2580000003 HC RX 258: Performed by: UROLOGY

## 2024-05-16 PROCEDURE — 2709999900 HC NON-CHARGEABLE SUPPLY: Performed by: UROLOGY

## 2024-05-16 PROCEDURE — 3600000003 HC SURGERY LEVEL 3 BASE: Performed by: UROLOGY

## 2024-05-16 PROCEDURE — A4217 STERILE WATER/SALINE, 500 ML: HCPCS | Performed by: UROLOGY

## 2024-05-16 PROCEDURE — 7100000000 HC PACU RECOVERY - FIRST 15 MIN: Performed by: UROLOGY

## 2024-05-16 PROCEDURE — 3700000001 HC ADD 15 MINUTES (ANESTHESIA): Performed by: UROLOGY

## 2024-05-16 PROCEDURE — 6360000002 HC RX W HCPCS: Performed by: UROLOGY

## 2024-05-16 PROCEDURE — 2580000003 HC RX 258: Performed by: ANESTHESIOLOGY

## 2024-05-16 PROCEDURE — 3700000000 HC ANESTHESIA ATTENDED CARE: Performed by: UROLOGY

## 2024-05-16 PROCEDURE — 2500000003 HC RX 250 WO HCPCS: Performed by: NURSE ANESTHETIST, CERTIFIED REGISTERED

## 2024-05-16 PROCEDURE — 6360000002 HC RX W HCPCS

## 2024-05-16 PROCEDURE — 3600000013 HC SURGERY LEVEL 3 ADDTL 15MIN: Performed by: UROLOGY

## 2024-05-16 PROCEDURE — 7100000001 HC PACU RECOVERY - ADDTL 15 MIN: Performed by: UROLOGY

## 2024-05-16 RX ORDER — LIDOCAINE HYDROCHLORIDE 20 MG/ML
INJECTION, SOLUTION EPIDURAL; INFILTRATION; INTRACAUDAL; PERINEURAL PRN
Status: DISCONTINUED | OUTPATIENT
Start: 2024-05-16 | End: 2024-05-16 | Stop reason: SDUPTHER

## 2024-05-16 RX ORDER — DIPHENHYDRAMINE HYDROCHLORIDE 50 MG/ML
12.5 INJECTION INTRAMUSCULAR; INTRAVENOUS
Status: DISCONTINUED | OUTPATIENT
Start: 2024-05-16 | End: 2024-05-16 | Stop reason: HOSPADM

## 2024-05-16 RX ORDER — NALOXONE HYDROCHLORIDE 0.4 MG/ML
INJECTION, SOLUTION INTRAMUSCULAR; INTRAVENOUS; SUBCUTANEOUS PRN
Status: DISCONTINUED | OUTPATIENT
Start: 2024-05-16 | End: 2024-05-16 | Stop reason: HOSPADM

## 2024-05-16 RX ORDER — HYDROMORPHONE HYDROCHLORIDE 2 MG/ML
0.5 INJECTION, SOLUTION INTRAMUSCULAR; INTRAVENOUS; SUBCUTANEOUS EVERY 5 MIN PRN
Status: DISCONTINUED | OUTPATIENT
Start: 2024-05-16 | End: 2024-05-16 | Stop reason: HOSPADM

## 2024-05-16 RX ORDER — EPHEDRINE SULFATE 5 MG/ML
INJECTION INTRAVENOUS PRN
Status: DISCONTINUED | OUTPATIENT
Start: 2024-05-16 | End: 2024-05-16 | Stop reason: SDUPTHER

## 2024-05-16 RX ORDER — SODIUM CHLORIDE 9 MG/ML
INJECTION, SOLUTION INTRAVENOUS PRN
Status: DISCONTINUED | OUTPATIENT
Start: 2024-05-16 | End: 2024-05-16 | Stop reason: HOSPADM

## 2024-05-16 RX ORDER — FENTANYL CITRATE 50 UG/ML
INJECTION, SOLUTION INTRAMUSCULAR; INTRAVENOUS PRN
Status: DISCONTINUED | OUTPATIENT
Start: 2024-05-16 | End: 2024-05-16 | Stop reason: SDUPTHER

## 2024-05-16 RX ORDER — PROCHLORPERAZINE EDISYLATE 5 MG/ML
5 INJECTION INTRAMUSCULAR; INTRAVENOUS
Status: DISCONTINUED | OUTPATIENT
Start: 2024-05-16 | End: 2024-05-16 | Stop reason: HOSPADM

## 2024-05-16 RX ORDER — ACETAMINOPHEN 500 MG
1000 TABLET ORAL ONCE
Status: COMPLETED | OUTPATIENT
Start: 2024-05-16 | End: 2024-05-16

## 2024-05-16 RX ORDER — PROPOFOL 10 MG/ML
INJECTION, EMULSION INTRAVENOUS PRN
Status: DISCONTINUED | OUTPATIENT
Start: 2024-05-16 | End: 2024-05-16 | Stop reason: SDUPTHER

## 2024-05-16 RX ORDER — SULFAMETHOXAZOLE AND TRIMETHOPRIM 800; 160 MG/1; MG/1
1 TABLET ORAL 2 TIMES DAILY
Qty: 14 TABLET | Refills: 0 | Status: SHIPPED | OUTPATIENT
Start: 2024-05-16 | End: 2024-05-23

## 2024-05-16 RX ORDER — SODIUM CHLORIDE 0.9 % (FLUSH) 0.9 %
5-40 SYRINGE (ML) INJECTION PRN
Status: DISCONTINUED | OUTPATIENT
Start: 2024-05-16 | End: 2024-05-16 | Stop reason: HOSPADM

## 2024-05-16 RX ORDER — KETAMINE HCL IN NACL, ISO-OSM 20 MG/2 ML
SYRINGE (ML) INJECTION PRN
Status: DISCONTINUED | OUTPATIENT
Start: 2024-05-16 | End: 2024-05-16 | Stop reason: SDUPTHER

## 2024-05-16 RX ORDER — HYDROCODONE BITARTRATE AND ACETAMINOPHEN 5; 325 MG/1; MG/1
1 TABLET ORAL EVERY 6 HOURS PRN
Qty: 12 TABLET | Refills: 0 | Status: SHIPPED | OUTPATIENT
Start: 2024-05-16 | End: 2024-05-19

## 2024-05-16 RX ORDER — SODIUM CHLORIDE, SODIUM LACTATE, POTASSIUM CHLORIDE, CALCIUM CHLORIDE 600; 310; 30; 20 MG/100ML; MG/100ML; MG/100ML; MG/100ML
INJECTION, SOLUTION INTRAVENOUS CONTINUOUS
Status: DISCONTINUED | OUTPATIENT
Start: 2024-05-16 | End: 2024-05-16 | Stop reason: HOSPADM

## 2024-05-16 RX ORDER — SODIUM CHLORIDE 0.9 % (FLUSH) 0.9 %
5-40 SYRINGE (ML) INJECTION EVERY 12 HOURS SCHEDULED
Status: DISCONTINUED | OUTPATIENT
Start: 2024-05-16 | End: 2024-05-16 | Stop reason: HOSPADM

## 2024-05-16 RX ORDER — OXYCODONE HYDROCHLORIDE 5 MG/1
5 TABLET ORAL PRN
Status: COMPLETED | OUTPATIENT
Start: 2024-05-16 | End: 2024-05-16

## 2024-05-16 RX ORDER — OXYCODONE HYDROCHLORIDE 5 MG/1
10 TABLET ORAL PRN
Status: COMPLETED | OUTPATIENT
Start: 2024-05-16 | End: 2024-05-16

## 2024-05-16 RX ORDER — GENTAMICIN SULFATE 60 MG/50ML
120 INJECTION, SOLUTION INTRAVENOUS
Status: DISCONTINUED | OUTPATIENT
Start: 2024-05-16 | End: 2024-05-16 | Stop reason: SDUPTHER

## 2024-05-16 RX ORDER — ONDANSETRON 2 MG/ML
4 INJECTION INTRAMUSCULAR; INTRAVENOUS
Status: DISCONTINUED | OUTPATIENT
Start: 2024-05-16 | End: 2024-05-16 | Stop reason: HOSPADM

## 2024-05-16 RX ORDER — ROCURONIUM BROMIDE 10 MG/ML
INJECTION, SOLUTION INTRAVENOUS PRN
Status: DISCONTINUED | OUTPATIENT
Start: 2024-05-16 | End: 2024-05-16 | Stop reason: SDUPTHER

## 2024-05-16 RX ORDER — PHENYLEPHRINE HYDROCHLORIDE 10 MG/ML
INJECTION INTRAVENOUS PRN
Status: DISCONTINUED | OUTPATIENT
Start: 2024-05-16 | End: 2024-05-16 | Stop reason: SDUPTHER

## 2024-05-16 RX ORDER — MIDAZOLAM HYDROCHLORIDE 1 MG/ML
INJECTION INTRAMUSCULAR; INTRAVENOUS PRN
Status: DISCONTINUED | OUTPATIENT
Start: 2024-05-16 | End: 2024-05-16 | Stop reason: SDUPTHER

## 2024-05-16 RX ORDER — DEXAMETHASONE SODIUM PHOSPHATE 4 MG/ML
INJECTION, SOLUTION INTRA-ARTICULAR; INTRALESIONAL; INTRAMUSCULAR; INTRAVENOUS; SOFT TISSUE PRN
Status: DISCONTINUED | OUTPATIENT
Start: 2024-05-16 | End: 2024-05-16 | Stop reason: SDUPTHER

## 2024-05-16 RX ORDER — LIDOCAINE HYDROCHLORIDE 10 MG/ML
1 INJECTION, SOLUTION INFILTRATION; PERINEURAL
Status: DISCONTINUED | OUTPATIENT
Start: 2024-05-16 | End: 2024-05-16 | Stop reason: HOSPADM

## 2024-05-16 RX ORDER — ONDANSETRON 2 MG/ML
INJECTION INTRAMUSCULAR; INTRAVENOUS PRN
Status: DISCONTINUED | OUTPATIENT
Start: 2024-05-16 | End: 2024-05-16 | Stop reason: SDUPTHER

## 2024-05-16 RX ORDER — MIDAZOLAM HYDROCHLORIDE 2 MG/2ML
2 INJECTION, SOLUTION INTRAMUSCULAR; INTRAVENOUS
Status: DISCONTINUED | OUTPATIENT
Start: 2024-05-16 | End: 2024-05-16 | Stop reason: HOSPADM

## 2024-05-16 RX ADMIN — ROCURONIUM BROMIDE 40 MG: 10 INJECTION, SOLUTION INTRAVENOUS at 13:49

## 2024-05-16 RX ADMIN — PHENYLEPHRINE HYDROCHLORIDE 100 MCG: 10 INJECTION INTRAVENOUS at 14:03

## 2024-05-16 RX ADMIN — ACETAMINOPHEN 1000 MG: 500 TABLET, FILM COATED ORAL at 12:01

## 2024-05-16 RX ADMIN — ONDANSETRON 4 MG: 2 INJECTION INTRAMUSCULAR; INTRAVENOUS at 14:03

## 2024-05-16 RX ADMIN — PROPOFOL 200 MG: 10 INJECTION, EMULSION INTRAVENOUS at 13:49

## 2024-05-16 RX ADMIN — FENTANYL CITRATE 100 MCG: 50 INJECTION, SOLUTION INTRAMUSCULAR; INTRAVENOUS at 13:49

## 2024-05-16 RX ADMIN — GENTAMICIN SULFATE 120 MG: 40 INJECTION, SOLUTION INTRAMUSCULAR; INTRAVENOUS at 13:49

## 2024-05-16 RX ADMIN — EPHEDRINE SULFATE 10 MG: 5 INJECTION INTRAVENOUS at 14:17

## 2024-05-16 RX ADMIN — FENTANYL CITRATE 25 MCG: 50 INJECTION, SOLUTION INTRAMUSCULAR; INTRAVENOUS at 15:34

## 2024-05-16 RX ADMIN — PHENYLEPHRINE HYDROCHLORIDE 200 MCG: 10 INJECTION INTRAVENOUS at 14:16

## 2024-05-16 RX ADMIN — OXYCODONE 10 MG: 5 TABLET ORAL at 16:43

## 2024-05-16 RX ADMIN — Medication 1500 MG: at 13:49

## 2024-05-16 RX ADMIN — FENTANYL CITRATE 50 MCG: 50 INJECTION, SOLUTION INTRAMUSCULAR; INTRAVENOUS at 14:52

## 2024-05-16 RX ADMIN — Medication 20 MG: at 14:40

## 2024-05-16 RX ADMIN — LIDOCAINE HYDROCHLORIDE 100 MG: 20 INJECTION, SOLUTION EPIDURAL; INFILTRATION; INTRACAUDAL; PERINEURAL at 13:49

## 2024-05-16 RX ADMIN — MIDAZOLAM 2 MG: 1 INJECTION INTRAMUSCULAR; INTRAVENOUS at 13:44

## 2024-05-16 RX ADMIN — DEXAMETHASONE SODIUM PHOSPHATE 4 MG: 4 INJECTION INTRA-ARTICULAR; INTRALESIONAL; INTRAMUSCULAR; INTRAVENOUS; SOFT TISSUE at 14:03

## 2024-05-16 RX ADMIN — SODIUM CHLORIDE, POTASSIUM CHLORIDE, SODIUM LACTATE AND CALCIUM CHLORIDE: 600; 310; 30; 20 INJECTION, SOLUTION INTRAVENOUS at 11:58

## 2024-05-16 RX ADMIN — PHENYLEPHRINE HYDROCHLORIDE 200 MCG: 10 INJECTION INTRAVENOUS at 14:09

## 2024-05-16 RX ADMIN — LIDOCAINE HYDROCHLORIDE 100 MG: 20 INJECTION, SOLUTION EPIDURAL; INFILTRATION; INTRACAUDAL; PERINEURAL at 15:30

## 2024-05-16 RX ADMIN — FENTANYL CITRATE 25 MCG: 50 INJECTION, SOLUTION INTRAMUSCULAR; INTRAVENOUS at 15:43

## 2024-05-16 RX ADMIN — Medication 1500 MG: at 12:39

## 2024-05-16 ASSESSMENT — PAIN - FUNCTIONAL ASSESSMENT
PAIN_FUNCTIONAL_ASSESSMENT: 0-10
PAIN_FUNCTIONAL_ASSESSMENT: NONE - DENIES PAIN
PAIN_FUNCTIONAL_ASSESSMENT: NONE - DENIES PAIN
PAIN_FUNCTIONAL_ASSESSMENT: 0-10

## 2024-05-16 ASSESSMENT — PAIN SCALES - GENERAL: PAINLEVEL_OUTOF10: 7

## 2024-05-16 ASSESSMENT — PAIN DESCRIPTION - LOCATION: LOCATION: SCROTUM;PERINEUM

## 2024-05-16 NOTE — H&P
Dagoberto Echevarria  : 1960           HPI   63 y.o., male    Hx of prostate cancer , s/p prostatectomy in 10-22, Salvage radiation (completed 23) with 2 years ADT  . TNM CLASSIFICATION: pT3, pN0.   Presented with elevated PSA. PSA 7.5 in . No previous values noted.  No voiding problems. No family hx of prostate cancer. No weight loss or bone pain .GARFIELD showed no nodules.   Repeat PSA 8.5 in .  MRI 22:   FINDINGS:   Last PSA: 7.5   Prostate Size: 4.0 x 4.2 x 2.7 cm with a calculated volume of  23.5 mL.   PSA Density: 0.32 ng/mL       BPH: No significant BPH.       Hemorrhage: None.       Peripheral Zone: There is areas of T2 hyperintensity throughout both peripheral   zones. No suspicious lesions are described below.        Transition/Central Zone: No suspicious transition zone lesion.       Lesion # 1   -Size and location: A 1.5 x 1.3 x 1.2 cm T2 hypointense area within the right   peripheral zone of the gland base (series 4 image 15 and series 6 image 16).   -DWI/ADC: Diffusion restriction.   -DCE: The lesion demonstrates enhancement.   -Prostate Margin: No evidence of extracapsular extension.   -PI-RADS Category: 5       Lesion # 2   -Size and location: A 0.9 x 0.7 cm T2 hypointense area within the left   peripheral zone of the mid gland (series 4 image 16).   -DWI/ADC: Mild diffusion restriction.   -DCE: No appreciable asymmetric enhancement.   -Prostate Margin: No evidence of extracapsular extension.   -PI-RADS Category: 3       Seminal Vesicles: The seminal vesicles are unremarkable.       Lymph Nodes: No appreciable pelvic lymphadenopathy.       Other: Bilateral fat-containing inguinal hernias.           Impression   1.  PI-RADS 5 lesion within the right peripheral zone of the gland base,   suspicious for malignancy.   2.  PI-RADS 3 lesion within the left peripheral zone of the mid gland.   3.  No evidence fracture capsular extension or pelvic lymphadenopathy.      Had MRI fusion

## 2024-05-16 NOTE — BRIEF OP NOTE
Brief Postoperative Note      Patient: Dagoberto Echevarria  YOB: 1960  MRN: [id number]    Date of Procedure: 5/16/2024    Pre-Op Diagnosis Codes:     * Male urinary stress incontinence [N39.3]    Post-Op Diagnosis: Same       Procedure(s):  ATTEMPTED PLACEMENT OF ARTIFICIAL URINARY SPHINCTER    Surgeon(s):  Dwayne Morales Jr., MD    Assistant:  * No surgical staff found *    Anesthesia: General    Estimated Blood Loss (mL): less than 50     Complications: None    Specimens:   * No specimens in log *    Implants:  * No implants in log *      Drains:   Urinary Catheter 05/16/24 2 Way (Active)       Findings:  Infection Present At Time Of Surgery (PATOS) (choose all levels that have infection present):  No infection present  Other Findings: see op note    Electronically signed by DWAYNE MORALES JR, MD on 5/16/2024 at 3:35 PM

## 2024-05-16 NOTE — PERIOP NOTE
Patient using incentive spirometer, awake, alert, and ambulating at bedside, O2 reading 93% on room air after ambulation sitting up on side of bed.

## 2024-05-16 NOTE — ANESTHESIA PRE PROCEDURE
Department of Anesthesiology  Preprocedure Note       Name:  Dagoberto Echevarria   Age:  63 y.o.  :  1960                                          MRN:  [id number]         Date:  2024      Surgeon: Surgeon(s):  Minh Guido Jr., MD    Procedure: Procedure(s):  PLACEMENT OF ARTIFICIAL URINARY SPHINCTER    Medications prior to admission:   Prior to Admission medications    Medication Sig Start Date End Date Taking? Authorizing Provider   DULoxetine (CYMBALTA) 30 MG extended release capsule Take 1 capsule by mouth daily 3/15/24   Ron Bates MD   valsartan (DIOVAN) 80 MG tablet Take 1 tablet by mouth daily 24   Tj Coburn MD   predniSONE (DELTASONE) 5 MG tablet Take 1 tablet by mouth daily 10/7/23   Ron Bates MD   Abiraterone Acetate (ZYTIGA) 500 MG TABS Take 1 tablet by mouth daily    Ron Bates MD   Calcium Carbonate (CALCIUM 600 PO) Take 1 tablet by mouth daily    Ron Bates MD   Cholecalciferol (VITAMIN D3) 50 MCG (2000) CAPS Take 1 capsule by mouth daily    Ron Bates MD   Black Cohosh 540 MG CAPS Take 1 capsule by mouth daily    Ron Bates MD       Current medications:    Current Facility-Administered Medications   Medication Dose Route Frequency Provider Last Rate Last Admin    lidocaine 1 % injection 1 mL  1 mL IntraDERmal Once PRN Chucho Lynn MD        acetaminophen (TYLENOL) tablet 1,000 mg  1,000 mg Oral Once Chucho Lynn MD        lactated ringers IV soln infusion   IntraVENous Continuous Chucho Lynn MD        sodium chloride flush 0.9 % injection 5-40 mL  5-40 mL IntraVENous 2 times per day Chucho Lynn MD        sodium chloride flush 0.9 % injection 5-40 mL  5-40 mL IntraVENous PRN Chucho Lynn MD        0.9 % sodium chloride infusion   IntraVENous PRN Chucho Lynn MD        midazolam PF (VERSED) injection 2 mg  2 mg IntraVENous Once PRN Chucho Lynn MD

## 2024-05-16 NOTE — PERIOP NOTE
Discharge instructions reviewed with pt and family member who verbalize understanding of follow up care, Daley catheter care instructions given with leg bag and standard bag for exchange as instructed.

## 2024-05-16 NOTE — DISCHARGE INSTRUCTIONS
If you have had surgery in the past 7-10 days by one of our providers and are having fever, bleeding, or drainage from an incision, have an opening in an incision, or having issues urinating properly, please call [phone].    Monitor your incision site for signs and symptoms of infection  Warm, unexpected drainage from incision site, swelling increased pain, and fever.    After general anesthesia or intravenous sedation, for 24 hours or while taking prescription Narcotics:  Limit your activities  Some people will feel drowsy or dizzy for up to a few hours after waking up.  A responsible adult needs to be with you for the next 24 hours  Do not drive and operate hazardous machinery  Do not make important personal or business decisions  Do not drink alcoholic beverages  If you have not urinated within 8 hours after discharge, and you are experiencing discomfort from urinary retention, please go to the nearest ED.  If you have sleep apnea and have a CPAP machine, please use it for all naps and sleeping.  Please use caution when taking narcotics and any of your home medications that may cause drowsiness.  *  Please give a list of your current medications to your Primary Care Provider.  *  Please update this list whenever your medications are discontinued, doses are      changed, or new medications (including over-the-counter products) are added.  *  Please carry medication information at all times in case of emergency situations.    These are general instructions for a healthy lifestyle:  No smoking/ No tobacco products/ Avoid exposure to second hand smoke  Surgeon General's Warning:  Quitting smoking now greatly reduces serious risk to your health.  Obesity, smoking, and sedentary lifestyle greatly increases your risk for illness  A healthy diet, regular physical exercise & weight monitoring are important for maintaining a healthy lifestyle    You may be retaining fluid if you have a history of heart failure or if

## 2024-05-17 NOTE — OP NOTE
[address]  [address]                            OPERATIVE REPORT      PATIENT NAME: RUDY JUNIOR              : 1960  MED REC NO: [id number]                       ROOM: [facility] NO: [id number]                       ADMIT DATE: 2024  PROVIDER: Minh Guido Jr, MD    DATE OF SERVICE:  2024    PREOPERATIVE DIAGNOSES:  Male urinary stress incontinence.    POSTOPERATIVE DIAGNOSES:  Male urinary stress incontinence.    PROCEDURES PERFORMED:  Attempted placement of artificial urinary sphincter.    SURGEON:  Minh Guido Jr, MD    ASSISTANT:  None.    ANESTHESIA:  General.    ESTIMATED BLOOD LOSS:  Minimal.    SPECIMENS REMOVED:  None.    INTRAOPERATIVE FINDINGS:  Surgery was aborted due to an injury sustained to the bulbous urethra, which was recognized and repaired.     COMPLICATIONS:  None.    IMPLANTS:  None.    INDICATIONS:  The patient is status post radical prostatectomy for prostate cancer followed by salvage radiotherapy and has male urinary stress incontinence.  Brought in at this time for placement of an artificial urinary sphincter.    DESCRIPTION OF PROCEDURE:  The patient was given a general anesthetic, placed in the dorsal lithotomy position in [facility].  The lower abdomen and perineum were prepped and draped in a sterile fashion.  The 14-Nepalese Daley was placed and went into the bladder easily.  I sewn the scrotum to the lower abdominal skin using a silk suture.  A midline perineal incision was made.  The patient is obese and the visualization was somewhat difficult due to this.  We used self-retaining Lavon retractors as well as a prostatic retractor in the anterior portion of the incision.    I dissected down through a large amount of perineal fat.  We were eventually able to palpate the Daley catheter in the bulbous urethra.     I dissected the bulbospongiosus muscle off the urethra using

## 2024-05-17 NOTE — ANESTHESIA POSTPROCEDURE EVALUATION
Department of Anesthesiology  Postprocedure Note    Patient: Dagoberto Echevarria  MRN: [id number]  YOB: 1960  Date of evaluation: 5/17/2024    Procedure Summary       Date: 05/16/24 Room / Location: [facility] MAIN OR 02 / [facility] MAIN OR    Anesthesia Start: 1337 Anesthesia Stop: 1551    Procedure: ATTEMPTED PLACEMENT OF ARTIFICIAL URINARY SPHINCTER (Perineum) Diagnosis:       Male urinary stress incontinence      (Male urinary stress incontinence [N39.3])    Providers: Minh Guido Jr., MD Responsible Provider: Chucho Lynn MD    Anesthesia Type: General ASA Status: 2            Anesthesia Type: General    Leatha Phase I: Leatha Score: 8    Leatha Phase II: Leatha Score: 10    Anesthesia Post Evaluation    Patient location during evaluation: PACU  Patient participation: complete - patient participated  Level of consciousness: awake and alert  Airway patency: patent  Nausea & Vomiting: no nausea and no vomiting  Cardiovascular status: hemodynamically stable  Respiratory status: acceptable, nonlabored ventilation and spontaneous ventilation  Hydration status: euvolemic  Comments: /76   Pulse (!) 103   Temp 98 °F (36.7 °C) (Temporal)   Resp 16   Ht 1.778 m (5' 10\")   Wt 100.6 kg (221 lb 12.8 oz)   SpO2 93%   BMI 31.82 kg/m²     Multimodal analgesia pain management approach  Pain management: adequate and satisfactory to patient        No notable events documented.

## 2024-05-20 ENCOUNTER — TELEPHONE (OUTPATIENT)
Dept: UROLOGY | Age: 64
End: 2024-05-20

## 2024-05-20 NOTE — TELEPHONE ENCOUNTER
Called pt to discuss findings at surgery on 5-16-24.   He had a urethral injury at time of attempted AUS placement. Placement was aborted.   Needs to keep he for 10 days, can be seen by NP the for he removal. Make appt with me after that at first available appt

## 2024-05-24 ENCOUNTER — HOSPITAL ENCOUNTER (EMERGENCY)
Age: 64
Discharge: HOME | End: 2024-05-24
Attending: EMERGENCY MEDICINE
Payer: COMMERCIAL

## 2024-05-24 ENCOUNTER — APPOINTMENT (OUTPATIENT)
Dept: CT IMAGING | Age: 64
End: 2024-05-24
Payer: COMMERCIAL

## 2024-05-24 VITALS
DIASTOLIC BLOOD PRESSURE: 79 MMHG | HEART RATE: 73 BPM | BODY MASS INDEX: 31.5 KG/M2 | TEMPERATURE: 98.2 F | WEIGHT: 225 LBS | OXYGEN SATURATION: 94 % | SYSTOLIC BLOOD PRESSURE: 115 MMHG | RESPIRATION RATE: 18 BRPM | HEIGHT: 71 IN

## 2024-05-24 DIAGNOSIS — R30.1 PAINFUL BLADDER SPASM: Primary | ICD-10-CM

## 2024-05-24 LAB
ALBUMIN SERPL-MCNC: 4.1 G/DL (ref 3.2–4.6)
ALBUMIN/GLOB SERPL: 1.4 (ref 1–1.9)
ALP SERPL-CCNC: 93 U/L (ref 40–129)
ALT SERPL-CCNC: 17 U/L (ref 12–65)
ANION GAP SERPL CALC-SCNC: 11 MMOL/L (ref 9–18)
APPEARANCE UR: CLEAR
AST SERPL-CCNC: 20 U/L (ref 15–37)
BACTERIA URNS QL MICRO: NEGATIVE /HPF
BASOPHILS # BLD: 0.1 K/UL (ref 0–0.2)
BASOPHILS NFR BLD: 1 % (ref 0–2)
BILIRUB SERPL-MCNC: 0.4 MG/DL (ref 0–1.2)
BILIRUB UR QL: NEGATIVE
BUN SERPL-MCNC: 12 MG/DL (ref 8–23)
CALCIUM SERPL-MCNC: 9.3 MG/DL (ref 8.8–10.2)
CASTS URNS QL MICRO: ABNORMAL /LPF
CHLORIDE SERPL-SCNC: 101 MMOL/L (ref 98–107)
CO2 SERPL-SCNC: 27 MMOL/L (ref 20–28)
COLOR UR: ABNORMAL
CREAT SERPL-MCNC: 0.95 MG/DL (ref 0.8–1.3)
DIFFERENTIAL METHOD BLD: ABNORMAL
EOSINOPHIL # BLD: 0.2 K/UL (ref 0–0.8)
EOSINOPHIL NFR BLD: 2 % (ref 0.5–7.8)
EPI CELLS #/AREA URNS HPF: ABNORMAL /HPF
ERYTHROCYTE [DISTWIDTH] IN BLOOD BY AUTOMATED COUNT: 13.3 % (ref 11.9–14.6)
GLOBULIN SER CALC-MCNC: 2.9 G/DL (ref 2.3–3.5)
GLUCOSE SERPL-MCNC: 113 MG/DL (ref 70–99)
GLUCOSE UR STRIP.AUTO-MCNC: NEGATIVE MG/DL
HCT VFR BLD AUTO: 43.2 % (ref 41.1–50.3)
HGB BLD-MCNC: 14.3 G/DL (ref 13.6–17.2)
HGB UR QL STRIP: ABNORMAL
IMM GRANULOCYTES # BLD AUTO: 0.1 K/UL (ref 0–0.5)
IMM GRANULOCYTES NFR BLD AUTO: 1 % (ref 0–5)
KETONES UR QL STRIP.AUTO: NEGATIVE MG/DL
LEUKOCYTE ESTERASE UR QL STRIP.AUTO: NEGATIVE
LIPASE SERPL-CCNC: 25 U/L (ref 13–60)
LYMPHOCYTES # BLD: 0.9 K/UL (ref 0.5–4.6)
LYMPHOCYTES NFR BLD: 9 % (ref 13–44)
MCH RBC QN AUTO: 29.5 PG (ref 26.1–32.9)
MCHC RBC AUTO-ENTMCNC: 33.1 G/DL (ref 31.4–35)
MCV RBC AUTO: 89.3 FL (ref 82–102)
MONOCYTES # BLD: 0.7 K/UL (ref 0.1–1.3)
MONOCYTES NFR BLD: 7 % (ref 4–12)
MUCOUS THREADS URNS QL MICRO: 0 /LPF
NEUTS SEG # BLD: 7.8 K/UL (ref 1.7–8.2)
NEUTS SEG NFR BLD: 80 % (ref 43–78)
NITRITE UR QL STRIP.AUTO: NEGATIVE
NRBC # BLD: 0 K/UL (ref 0–0.2)
PH UR STRIP: 6 (ref 5–9)
PLATELET # BLD AUTO: 328 K/UL (ref 150–450)
PMV BLD AUTO: 8.6 FL (ref 9.4–12.3)
POTASSIUM SERPL-SCNC: 4.1 MMOL/L (ref 3.5–5.1)
PROT SERPL-MCNC: 7 G/DL (ref 6.3–8.2)
PROT UR STRIP-MCNC: NEGATIVE MG/DL
RBC # BLD AUTO: 4.84 M/UL (ref 4.23–5.6)
RBC #/AREA URNS HPF: ABNORMAL /HPF
SODIUM SERPL-SCNC: 139 MMOL/L (ref 136–145)
SP GR UR REFRACTOMETRY: >1.035 (ref 1–1.02)
URINE CULTURE IF INDICATED: ABNORMAL
UROBILINOGEN UR QL STRIP.AUTO: 0.2 EU/DL (ref 0.2–1)
WBC # BLD AUTO: 9.7 K/UL (ref 4.3–11.1)
WBC URNS QL MICRO: ABNORMAL /HPF

## 2024-05-24 PROCEDURE — 6360000002 HC RX W HCPCS: Performed by: EMERGENCY MEDICINE

## 2024-05-24 PROCEDURE — 96374 THER/PROPH/DIAG INJ IV PUSH: CPT

## 2024-05-24 PROCEDURE — 96361 HYDRATE IV INFUSION ADD-ON: CPT

## 2024-05-24 PROCEDURE — 99285 EMERGENCY DEPT VISIT HI MDM: CPT

## 2024-05-24 PROCEDURE — 81001 URINALYSIS AUTO W/SCOPE: CPT

## 2024-05-24 PROCEDURE — 6360000004 HC RX CONTRAST MEDICATION: Performed by: EMERGENCY MEDICINE

## 2024-05-24 PROCEDURE — 96375 TX/PRO/DX INJ NEW DRUG ADDON: CPT

## 2024-05-24 PROCEDURE — 85025 COMPLETE CBC W/AUTO DIFF WBC: CPT

## 2024-05-24 PROCEDURE — 74177 CT ABD & PELVIS W/CONTRAST: CPT

## 2024-05-24 PROCEDURE — 94762 N-INVAS EAR/PLS OXIMTRY CONT: CPT

## 2024-05-24 PROCEDURE — 83690 ASSAY OF LIPASE: CPT

## 2024-05-24 PROCEDURE — 93005 ELECTROCARDIOGRAM TRACING: CPT | Performed by: EMERGENCY MEDICINE

## 2024-05-24 PROCEDURE — 2580000003 HC RX 258: Performed by: EMERGENCY MEDICINE

## 2024-05-24 PROCEDURE — 80053 COMPREHEN METABOLIC PANEL: CPT

## 2024-05-24 RX ORDER — HYDROCODONE BITARTRATE AND ACETAMINOPHEN 7.5; 325 MG/1; MG/1
1 TABLET ORAL EVERY 6 HOURS PRN
Qty: 12 TABLET | Refills: 0 | Status: SHIPPED | OUTPATIENT
Start: 2024-05-24 | End: 2024-05-27

## 2024-05-24 RX ORDER — PHENAZOPYRIDINE HYDROCHLORIDE 100 MG/1
100 TABLET, FILM COATED ORAL 3 TIMES DAILY PRN
Qty: 9 TABLET | Refills: 0 | Status: SHIPPED | OUTPATIENT
Start: 2024-05-24 | End: 2024-05-27

## 2024-05-24 RX ORDER — MORPHINE SULFATE 4 MG/ML
4 INJECTION, SOLUTION INTRAMUSCULAR; INTRAVENOUS
Status: COMPLETED | OUTPATIENT
Start: 2024-05-24 | End: 2024-05-24

## 2024-05-24 RX ORDER — ONDANSETRON 2 MG/ML
4 INJECTION INTRAMUSCULAR; INTRAVENOUS ONCE
Status: COMPLETED | OUTPATIENT
Start: 2024-05-24 | End: 2024-05-24

## 2024-05-24 RX ORDER — ONDANSETRON 4 MG/1
4 TABLET, FILM COATED ORAL 3 TIMES DAILY PRN
Qty: 12 TABLET | Refills: 0 | Status: SHIPPED | OUTPATIENT
Start: 2024-05-24

## 2024-05-24 RX ORDER — 0.9 % SODIUM CHLORIDE 0.9 %
1000 INTRAVENOUS SOLUTION INTRAVENOUS ONCE
Status: COMPLETED | OUTPATIENT
Start: 2024-05-24 | End: 2024-05-24

## 2024-05-24 RX ORDER — HYOSCYAMINE SULFATE 0.12 MG/1
125 TABLET SUBLINGUAL EVERY 4 HOURS PRN
Qty: 30 TABLET | Refills: 0 | Status: SHIPPED | OUTPATIENT
Start: 2024-05-24 | End: 2024-05-29

## 2024-05-24 RX ADMIN — ONDANSETRON 4 MG: 2 INJECTION INTRAMUSCULAR; INTRAVENOUS at 11:30

## 2024-05-24 RX ADMIN — SODIUM CHLORIDE 1000 ML: 9 INJECTION, SOLUTION INTRAVENOUS at 11:27

## 2024-05-24 RX ADMIN — MORPHINE SULFATE 4 MG: 4 INJECTION INTRAVENOUS at 11:30

## 2024-05-24 RX ADMIN — IOPAMIDOL 100 ML: 755 INJECTION, SOLUTION INTRAVENOUS at 11:02

## 2024-05-24 ASSESSMENT — PAIN DESCRIPTION - ORIENTATION
ORIENTATION: MID;LOWER
ORIENTATION: LOWER

## 2024-05-24 ASSESSMENT — PAIN SCALES - GENERAL
PAINLEVEL_OUTOF10: 7
PAINLEVEL_OUTOF10: 5

## 2024-05-24 ASSESSMENT — PAIN DESCRIPTION - DESCRIPTORS
DESCRIPTORS: BURNING;CRAMPING
DESCRIPTORS: BURNING;CRAMPING

## 2024-05-24 ASSESSMENT — LIFESTYLE VARIABLES
HOW MANY STANDARD DRINKS CONTAINING ALCOHOL DO YOU HAVE ON A TYPICAL DAY: PATIENT DOES NOT DRINK
HOW OFTEN DO YOU HAVE A DRINK CONTAINING ALCOHOL: NEVER

## 2024-05-24 ASSESSMENT — ENCOUNTER SYMPTOMS
ABDOMINAL PAIN: 1
SHORTNESS OF BREATH: 0
DIARRHEA: 0
VOMITING: 1
BACK PAIN: 0
NAUSEA: 1

## 2024-05-24 ASSESSMENT — PAIN DESCRIPTION - LOCATION
LOCATION: ABDOMEN
LOCATION: ABDOMEN;PENIS

## 2024-05-24 ASSESSMENT — PAIN - FUNCTIONAL ASSESSMENT
PAIN_FUNCTIONAL_ASSESSMENT: 0-10
PAIN_FUNCTIONAL_ASSESSMENT: 0-10

## 2024-05-24 NOTE — ED NOTES
Patient mobility status  with no difficulty. Provider aware     I have reviewed discharge instructions with the patient.  The patient verbalized understanding.    Patient left ED via Discharge Method: wheelchair to Home with Spouse.    Opportunity for questions and clarification provided.     Patient given 4 scripts.            Joe Gonzalez RN  05/24/24 [id number]

## 2024-05-24 NOTE — ED TRIAGE NOTES
Pt to ED via [facility] for lower abdominal pain, groin pain, and urinary catheter burning, discoloration of urine that began last night. Pt states having bladder surgery last week. States being on prophylactic antibiotics, bactrim. Pt does meet sepsis with EMS but unable to establish IV access enroute.     4mg zogran IM left deltoid    122/76 BP  90s  92% RA put 2 L NC   Temp 98.4 axillary

## 2024-05-24 NOTE — ED PROVIDER NOTES
Chloride 101 98 - 107 mmol/L    CO2 27 20 - 28 mmol/L    Anion Gap 11 9 - 18 mmol/L    Glucose 113 (H) 70 - 99 mg/dL    BUN 12 8 - 23 MG/DL    Creatinine 0.95 0.80 - 1.30 MG/DL    Est, Glom Filt Rate >90 >60 ml/min/1.73m2    Calcium 9.3 8.8 - 10.2 MG/DL    Total Bilirubin 0.4 0.0 - 1.2 MG/DL    ALT 17 12 - 65 U/L    AST 20 15 - 37 U/L    Alk Phosphatase 93 40 - 129 U/L    Total Protein 7.0 6.3 - 8.2 g/dL    Albumin 4.1 3.2 - 4.6 g/dL    Globulin 2.9 2.3 - 3.5 g/dL    Albumin/Globulin Ratio 1.4 1.0 - 1.9     Lipase    Collection Time: 05/24/24  9:49 AM   Result Value Ref Range    Lipase 25 13 - 60 U/L   EKG 12 Lead    Collection Time: 05/24/24 10:38 AM   Result Value Ref Range    Ventricular Rate 80 BPM    Atrial Rate 80 BPM    P-R Interval 142 ms    QRS Duration 103 ms    Q-T Interval 372 ms    QTc Calculation (Bazett) 430 ms    P Axis 17 degrees    R Axis 28 degrees    T Axis 10 degrees    Diagnosis Sinus rhythm    Urinalysis with Reflex to Culture    Collection Time: 05/24/24 11:39 AM    Specimen: Urine   Result Value Ref Range    Color, UA YELLOW/STRAW      Appearance CLEAR      Specific Gravity, UA >1.035 (H) 1.001 - 1.023    pH, Urine 6.0 5.0 - 9.0      Protein, UA Negative NEG mg/dL    Glucose, Ur Negative mg/dL    Ketones, Urine Negative NEG mg/dL    Bilirubin, Urine Negative NEG      Blood, Urine MODERATE (A) NEG      Urobilinogen, Urine 0.2 0.2 - 1.0 EU/dL    Nitrite, Urine Negative NEG      Leukocyte Esterase, Urine Negative NEG      Urine Culture if Indicated CULTURE NOT INDICATED BY UA RESULT      WBC, UA 0-4 U4 /hpf    RBC, UA 20-50 (A) U5 /hpf    BACTERIA, URINE Negative NEG /hpf    Epithelial Cells, UA 0-5 U5 /hpf    Casts 0-2 U2 /lpf    Mucus, UA 0 0 /lpf          CT ABDOMEN PELVIS W IV CONTRAST Additional Contrast? None   Final Result   Daley catheter within urinary bladder with findings suspicious for   cystitis. Clinical relation suggested.      Small hepatic cyst.      Cholelithiasis without CT

## 2024-05-25 LAB
EKG ATRIAL RATE: 80 BPM
EKG DIAGNOSIS: NORMAL
EKG P AXIS: 17 DEGREES
EKG P-R INTERVAL: 142 MS
EKG Q-T INTERVAL: 372 MS
EKG QRS DURATION: 103 MS
EKG QTC CALCULATION (BAZETT): 430 MS
EKG R AXIS: 28 DEGREES
EKG T AXIS: 10 DEGREES
EKG VENTRICULAR RATE: 80 BPM

## 2024-05-25 PROCEDURE — 93010 ELECTROCARDIOGRAM REPORT: CPT | Performed by: INTERNAL MEDICINE

## 2024-05-28 ENCOUNTER — NURSE ONLY (OUTPATIENT)
Dept: UROLOGY | Age: 64
End: 2024-05-28

## 2024-05-28 NOTE — PROGRESS NOTES
Pt came in for catheter removal. Daley catheter removed without incident. Pt instructed to push fluids, 6-8 glasses of water and if he has not voided on his own by 3:00p to call us and come in this afternoon for reinsertion.    Patient also asked me to check his incisions, incisions clean, dry and intact.  No drainage or redness noted.

## 2024-06-24 ENCOUNTER — TELEPHONE (OUTPATIENT)
Dept: UROLOGY | Age: 64
End: 2024-06-24

## 2024-06-24 ENCOUNTER — OFFICE VISIT (OUTPATIENT)
Dept: UROLOGY | Age: 64
End: 2024-06-24
Payer: COMMERCIAL

## 2024-06-24 ENCOUNTER — PREP FOR PROCEDURE (OUTPATIENT)
Dept: UROLOGY | Age: 64
End: 2024-06-24

## 2024-06-24 DIAGNOSIS — T14.8XXA HEMATOMA: ICD-10-CM

## 2024-06-24 DIAGNOSIS — R97.20 ELEVATED PSA: ICD-10-CM

## 2024-06-24 DIAGNOSIS — N39.3 MALE STRESS INCONTINENCE: ICD-10-CM

## 2024-06-24 DIAGNOSIS — N39.3 STRESS INCONTINENCE, MALE: ICD-10-CM

## 2024-06-24 DIAGNOSIS — N39.3 MALE URINARY STRESS INCONTINENCE: Primary | ICD-10-CM

## 2024-06-24 DIAGNOSIS — C61 PROSTATE CANCER (HCC): ICD-10-CM

## 2024-06-24 DIAGNOSIS — N39.3 MALE STRESS INCONTINENCE: Primary | ICD-10-CM

## 2024-06-24 LAB
BILIRUBIN, URINE, POC: NEGATIVE
BLOOD URINE, POC: NORMAL
GLUCOSE URINE, POC: NEGATIVE
KETONES, URINE, POC: NEGATIVE
LEUKOCYTE ESTERASE, URINE, POC: NEGATIVE
NITRITE, URINE, POC: NEGATIVE
PH, URINE, POC: 5.5 (ref 4.6–8)
PROTEIN,URINE, POC: NEGATIVE
SPECIFIC GRAVITY, URINE, POC: 1.02 (ref 1–1.03)
URINALYSIS CLARITY, POC: NORMAL
URINALYSIS COLOR, POC: NORMAL
UROBILINOGEN, POC: NORMAL

## 2024-06-24 PROCEDURE — 99024 POSTOP FOLLOW-UP VISIT: CPT | Performed by: UROLOGY

## 2024-06-24 PROCEDURE — 81003 URINALYSIS AUTO W/O SCOPE: CPT | Performed by: UROLOGY

## 2024-06-24 RX ORDER — SODIUM CHLORIDE 0.9 % (FLUSH) 0.9 %
5-40 SYRINGE (ML) INJECTION EVERY 12 HOURS SCHEDULED
OUTPATIENT
Start: 2024-06-24

## 2024-06-24 RX ORDER — SODIUM CHLORIDE 9 MG/ML
INJECTION, SOLUTION INTRAVENOUS PRN
OUTPATIENT
Start: 2024-06-24

## 2024-06-24 RX ORDER — SODIUM CHLORIDE 0.9 % (FLUSH) 0.9 %
5-40 SYRINGE (ML) INJECTION PRN
OUTPATIENT
Start: 2024-06-24

## 2024-06-24 ASSESSMENT — ENCOUNTER SYMPTOMS: BACK PAIN: 0

## 2024-06-24 NOTE — PROGRESS NOTES
CORE.          M:  \"PROSTATE, RIGHT MID, BIOPSY\":             PROSTATIC ADENOCARCINOMA, BOB SCORE 4 + 3 = 7 (GRADE GROUP   3),                  DISCONTINUOUSLY INVOLVING 50% OF 1 CORE.          N:  \"PROSTATE, RIGHT APEX, BIOPSY\":             PROSTATIC ADENOCARCINOMA, BOB SCORE 3 + 4 = 7 (GRADE GROUP   2;                  PERCENT PATTERN 4: <10%), DISCONTINUOUSLY INVOLVING 80% OF        1 CORE.       Stage T 1c, Grade 4+4 =8 prostate cancer.   Bone scan 8-31-22:   Normal whole body bone scan. No scintigraphic findings present to   suspect osseous metastatic disease.   PSMA PET scan 9-2-22:   1. Prostate gland activity closely following findings by MRI with intense focal   activity in the right peripheral zone likely related to the PIRADS category 5   lesion at this level, and mild to moderate activity in the left peripheral zone   likely corresponding to the PIRADS category 3 lesion previously described.       2. No findings concerning for metastatic disease.          S/p radical retropubic prostatectomy, bilateral pelvic LND on 10-6-22. Path: DIAGNOSIS        A:  \" RIGHT PELVIC LYMPH NODE\":         TWO LYMPH NODES NEGATIVE FOR METASTATIC TUMOR          B:  \" LEFT PELVIC LYMPH NODE\":         TWO LYMPH NODES NEGATIVE FOR METASTATIC TUMOR          C:  \" APICAL PROSTATE TISSUE\":         PROSTATE NEGATIVE FOR MALIGNANT TUMOR          D:  \" PROSTATE AND SEMINAL VESICLES\":         PROSTATIC ADENOCARCINOMA (BOB 4 + 3 = 7) INVOLVING RIGHT AND   LEFT POSTERIOR LOBES. EXTRACAPSULAR EXTENSION IS FOCALLY PRESENT ON RIGHT   SIDE. LEFT POSTERIOR PROSTATIC MARGIN OF RESECTION IS FOCALLY INVOLVED BY   ADENOCARCINOMA. PROXIMAL AND DISTAL PROSTATIC AND RIGHT POSTERIOR   PROSTATIC MARGINS OF RESECTION ARE NEGATIVE FOR MALIGNANT TUMOR. RIGHT AND   LEFT SEMINAL VESICLES ARE NEGATIVE FOR MALIGNANT TUMOR    Microscopic Description   A:  . Microscopic examination reveals sections of lymph node negative for   metastatic tumor.

## 2024-06-24 NOTE — TELEPHONE ENCOUNTER
----- Message from Minh Guido Jr., MD sent at 6/24/2024 11:46 AM EDT -----  Schedule placement of artificial urinary sphincter in Sept 2024.   Did orders  Need rep

## 2024-07-05 ENCOUNTER — OFFICE VISIT (OUTPATIENT)
Dept: INTERNAL MEDICINE CLINIC | Facility: CLINIC | Age: 64
End: 2024-07-05
Payer: COMMERCIAL

## 2024-07-05 VITALS
TEMPERATURE: 97.5 F | DIASTOLIC BLOOD PRESSURE: 80 MMHG | HEIGHT: 71 IN | RESPIRATION RATE: 18 BRPM | SYSTOLIC BLOOD PRESSURE: 114 MMHG | WEIGHT: 223.6 LBS | OXYGEN SATURATION: 95 % | HEART RATE: 87 BPM | BODY MASS INDEX: 31.3 KG/M2

## 2024-07-05 DIAGNOSIS — C61 PROSTATE CANCER (HCC): Primary | ICD-10-CM

## 2024-07-05 DIAGNOSIS — R42 VERTIGO: ICD-10-CM

## 2024-07-05 DIAGNOSIS — R51.9 INTRACTABLE HEADACHE, UNSPECIFIED CHRONICITY PATTERN, UNSPECIFIED HEADACHE TYPE: ICD-10-CM

## 2024-07-05 DIAGNOSIS — I10 ESSENTIAL (PRIMARY) HYPERTENSION: ICD-10-CM

## 2024-07-05 PROCEDURE — 3079F DIAST BP 80-89 MM HG: CPT | Performed by: INTERNAL MEDICINE

## 2024-07-05 PROCEDURE — 99214 OFFICE O/P EST MOD 30 MIN: CPT | Performed by: INTERNAL MEDICINE

## 2024-07-05 PROCEDURE — 3074F SYST BP LT 130 MM HG: CPT | Performed by: INTERNAL MEDICINE

## 2024-07-05 RX ORDER — MECLIZINE HYDROCHLORIDE 25 MG/1
25 TABLET ORAL 3 TIMES DAILY PRN
Qty: 30 TABLET | Refills: 3 | Status: SHIPPED | OUTPATIENT
Start: 2024-07-05 | End: 2024-08-14

## 2024-07-05 NOTE — PROGRESS NOTES
regular rhythm.   Pulmonary:      Effort: Pulmonary effort is normal.      Breath sounds: Normal breath sounds.   Skin:     General: Skin is warm.   Neurological:      General: No focal deficit present.      Mental Status: He is alert and oriented to person, place, and time.      Cranial Nerves: Cranial nerves 2-12 are intact.      Motor: Motor function is intact. No weakness.      Coordination: Coordination is intact.      Gait: Gait normal.      Comments: No obvious nystagmus noted.  Symptoms are slightly reproduced by head movement lying flat and sitting upright quickly.  Finger-nose testing was normal   Psychiatric:         Mood and Affect: Mood normal.         Behavior: Behavior normal.         Thought Content: Thought content normal.         Judgment: Judgment normal.       Assessment & Plan    Encounter Diagnoses   Name Primary?   • Prostate cancer (HCC) Yes   • Essential (primary) hypertension    • Vertigo    • Intractable headache, unspecified chronicity pattern, unspecified headache type        Current Outpatient Medications   Medication Sig Dispense Refill   • meclizine (ANTIVERT) 25 MG tablet Take 1 tablet by mouth 3 times daily as needed for Dizziness 30 tablet 3   • DULoxetine (CYMBALTA) 30 MG extended release capsule Take 1 capsule by mouth daily     • valsartan (DIOVAN) 80 MG tablet Take 1 tablet by mouth daily 90 tablet 3   • predniSONE (DELTASONE) 5 MG tablet Take 1 tablet by mouth daily     • Abiraterone Acetate (ZYTIGA) 500 MG TABS Take 1 tablet by mouth daily     • Calcium Carbonate (CALCIUM 600 PO) Take 1 tablet by mouth daily     • Cholecalciferol (VITAMIN D3) 50 MCG (2000 UT) CAPS Take 1 capsule by mouth daily     • Black Cohosh 540 MG CAPS Take 1 capsule by mouth daily     • ondansetron (ZOFRAN) 4 MG tablet Take 1 tablet by mouth 3 times daily as needed for Nausea or Vomiting (Patient not taking: Reported on 7/5/2024) 12 tablet 0   • Hyoscyamine Sulfate SL (LEVSIN/SL) 0.125 MG SUBL Place 1

## 2024-07-10 PROBLEM — N39.3 STRESS INCONTINENCE, MALE: Status: ACTIVE | Noted: 2024-06-24

## 2024-07-10 NOTE — TELEPHONE ENCOUNTER
Procedures: Procedure(s):   PLACEMENT OF ARTIFIIAL URINARY SPHINCTER   Date: 9/12/2024   Time: 0800   Location: [facility] MAIN OR 03     Scheduled.  Post op appt scheduled.

## 2024-07-17 DIAGNOSIS — R42 VERTIGO: Primary | ICD-10-CM

## 2024-07-24 ENCOUNTER — OFFICE VISIT (OUTPATIENT)
Age: 64
End: 2024-07-24
Payer: COMMERCIAL

## 2024-07-24 ENCOUNTER — OFFICE VISIT (OUTPATIENT)
Dept: ENT CLINIC | Age: 64
End: 2024-07-24
Payer: COMMERCIAL

## 2024-07-24 VITALS
DIASTOLIC BLOOD PRESSURE: 78 MMHG | WEIGHT: 224 LBS | BODY MASS INDEX: 31.36 KG/M2 | SYSTOLIC BLOOD PRESSURE: 116 MMHG | HEIGHT: 71 IN

## 2024-07-24 DIAGNOSIS — H81.10 BENIGN PAROXYSMAL POSITIONAL VERTIGO, UNSPECIFIED LATERALITY: ICD-10-CM

## 2024-07-24 DIAGNOSIS — H90.3 SENSORINEURAL HEARING LOSS, BILATERAL: Primary | ICD-10-CM

## 2024-07-24 DIAGNOSIS — R42 DIZZINESS AND GIDDINESS: Primary | Chronic | ICD-10-CM

## 2024-07-24 PROCEDURE — 92557 COMPREHENSIVE HEARING TEST: CPT | Performed by: AUDIOLOGIST

## 2024-07-24 PROCEDURE — 92567 TYMPANOMETRY: CPT | Performed by: AUDIOLOGIST

## 2024-07-24 PROCEDURE — 99203 OFFICE O/P NEW LOW 30 MIN: CPT | Performed by: PHYSICIAN ASSISTANT

## 2024-07-24 ASSESSMENT — ENCOUNTER SYMPTOMS
EYES NEGATIVE: 1
ALLERGIC/IMMUNOLOGIC NEGATIVE: 1
RESPIRATORY NEGATIVE: 1
GASTROINTESTINAL NEGATIVE: 1

## 2024-07-24 NOTE — PROGRESS NOTES
Dagoberto Echevarria is a 64 y.o. male presents today with c/o dizziness for the past 2 month. On Antivert. It began with one spot on parietal area head ache. Some lightheadedness and room spinning. It never stops and gets different levels of intensity but never relieves. The headache is constant. Exacerbated by going from sitting to standing, bending over, moving quickly to the side. Has to stop and let it calm down. No history of migraine.     40 radiation tx and on medication for 16 mos chemotherapy for metastatic prostate cancer. Now going to stop everything and give his body a break. His blood pressure and anxiety medication.     He thinks it's because his body is done with the medications, that's why he was going to give his body a break from all of it. In may had surgery had bladder surgery for artificial sphincter, punctured the urethra, so couldn't finish the sx and now is scheduled for Sept.     Audiogram:     Left ear:  normal sloping to mild SNHL  Right ear: normal sloping to mild SNHL  Discrimination score: Right ear 100 % and Left ear 100 %   Tympanogram: Right ear Type A and Left ear Type A.       Chief Complaint   Patient presents with    New Patient    Dizziness     Vertigo, dizziness and headaches.  Has been having constant dizziness and headaches for 8 weeks now.  Did MRI already.  Saw oncology and they are going to stop medications and see if that helps.  Has mix of room spinning and imbalance.  If he bends over of more his head to fast it can cause the dizziness to be worse.  Feels the dizziness constantly.  Denies pain, drainage, and itching.         Patient Active Problem List   Diagnosis    Elevated PSA    Prostate cancer (HCC)    Adenomatous polyp of descending colon    Male urinary stress incontinence    Stress incontinence, male        Reviewed and updated this visit by provider:  Tobacco  Allergies  Meds  Problems  Med Hx  Surg Hx  Fam Hx         Review of Systems   Constitutional:

## 2024-07-24 NOTE — PROGRESS NOTES
AUDIOLOGY EVALUATION    Dagoberto Echevarria had Tympanometry and Audiometry performed today.    The patient reports dizziness.     Results as follows:    Tympanometry    Type A -  bilaterally    Audiometry    Test Performed - Comprehensive Audiogram    Type of Loss - Right Ear: abnormal hearing: degree of loss is normal to moderate sensorineural hearing loss                           Left Ear: abnormal hearing: degree of loss is normal to moderate sensorineural hearing loss     SRT   Measurement Right Ear Left Ear   Value 15 20   Unit dB dB     Discrimination  Measurement Right Ear Left Ear   Value 100% 100%   Unit dB dB     Recommend  Annual audios    [facility]  Audiologist

## 2024-09-05 ENCOUNTER — HOSPITAL ENCOUNTER (OUTPATIENT)
Dept: SURGERY | Age: 64
Discharge: HOME | End: 2024-09-08
Payer: COMMERCIAL

## 2024-09-05 VITALS
SYSTOLIC BLOOD PRESSURE: 133 MMHG | TEMPERATURE: 97.7 F | OXYGEN SATURATION: 97 % | DIASTOLIC BLOOD PRESSURE: 95 MMHG | HEIGHT: 71 IN | WEIGHT: 225 LBS | HEART RATE: 80 BPM | RESPIRATION RATE: 16 BRPM | BODY MASS INDEX: 31.5 KG/M2

## 2024-09-05 LAB
ANION GAP SERPL CALC-SCNC: 12 MMOL/L (ref 9–18)
APPEARANCE UR: CLEAR
BILIRUB UR QL: ABNORMAL
BUN SERPL-MCNC: 13 MG/DL (ref 8–23)
CALCIUM SERPL-MCNC: 9.4 MG/DL (ref 8.8–10.2)
CHLORIDE SERPL-SCNC: 99 MMOL/L (ref 98–107)
CO2 SERPL-SCNC: 28 MMOL/L (ref 20–28)
COLOR UR: ABNORMAL
CREAT SERPL-MCNC: 0.85 MG/DL (ref 0.8–1.3)
ERYTHROCYTE [DISTWIDTH] IN BLOOD BY AUTOMATED COUNT: 13.2 % (ref 11.9–14.6)
GLUCOSE SERPL-MCNC: 100 MG/DL (ref 70–99)
GLUCOSE UR STRIP.AUTO-MCNC: NEGATIVE MG/DL
HCT VFR BLD AUTO: 44.9 % (ref 41.1–50.3)
HGB BLD-MCNC: 14.7 G/DL (ref 13.6–17.2)
HGB UR QL STRIP: NEGATIVE
KETONES UR QL STRIP.AUTO: ABNORMAL MG/DL
LEUKOCYTE ESTERASE UR QL STRIP.AUTO: NEGATIVE
MCH RBC QN AUTO: 29.7 PG (ref 26.1–32.9)
MCHC RBC AUTO-ENTMCNC: 32.7 G/DL (ref 31.4–35)
MCV RBC AUTO: 90.7 FL (ref 82–102)
NITRITE UR QL STRIP.AUTO: NEGATIVE
NRBC # BLD: 0 K/UL (ref 0–0.2)
PH UR STRIP: 5.5 (ref 5–9)
PLATELET # BLD AUTO: 342 K/UL (ref 150–450)
PMV BLD AUTO: 9 FL (ref 9.4–12.3)
POTASSIUM SERPL-SCNC: 4.3 MMOL/L (ref 3.5–5.1)
PROT UR STRIP-MCNC: NEGATIVE MG/DL
RBC # BLD AUTO: 4.95 M/UL (ref 4.23–5.6)
SODIUM SERPL-SCNC: 139 MMOL/L (ref 136–145)
SP GR UR REFRACTOMETRY: 1.02 (ref 1–1.02)
UROBILINOGEN UR QL STRIP.AUTO: 1 EU/DL (ref 0.2–1)
WBC # BLD AUTO: 8.6 K/UL (ref 4.3–11.1)

## 2024-09-05 PROCEDURE — 87086 URINE CULTURE/COLONY COUNT: CPT

## 2024-09-05 PROCEDURE — 85027 COMPLETE CBC AUTOMATED: CPT

## 2024-09-05 PROCEDURE — 80048 BASIC METABOLIC PNL TOTAL CA: CPT

## 2024-09-05 PROCEDURE — 81003 URINALYSIS AUTO W/O SCOPE: CPT

## 2024-09-05 ASSESSMENT — PAIN SCALES - GENERAL: PAINLEVEL_OUTOF10: 0

## 2024-09-05 NOTE — PERIOP NOTE
PLEASE CONTINUE TAKING ALL PRESCRIPTION MEDICATIONS UP TO THE DAY OF SURGERY UNLESS OTHERWISE DIRECTED BELOW.   TAKE ONLY THE MEDICATIONS LISTED HERE ON THE DAY OF SURGERY DATE OF SURGERY: 9/12/24     Duloxetine (Cymbalta)             PRESCRIPTION MEDICATION TO HOLD- PLEASE DO NOT STOP ANY PRESCRIPTION MEDICATIONS UNLESS SPECIFIED BELOW:      Please stop all vitamins & supplements 7 days prior to surgery and stop all NSAIDS (Aspirin/Excedrin/BC & Goody Powder, ibuprofen/Motrin/Advil, naproxen/Aleve) 5 days before your surgery.  Should you have a surgery date that does not allow for the amount of time instructed above, please stop taking vitamins, supplements, and NSAIDS IMMEDIATELY.      PRESCRIPTION MEDICATIONS TO HOLD :    none     Comments   9/11/24 the day before surgery please take 2 Tylenol in the morning and then again before bed (DO NOT USE TYLENOL/ACETAMINOPHEN IF YOU HAVE A KNOWN HISTORY OF LIVER DISEASE). You may use either regular or extra strength.              Please do not bring home medications with you on the day of surgery unless otherwise directed by your nurse.  If you are instructed to bring home medications, please give them to your nurse as they will be administered by the nursing staff.    If you have any questions, please call [facility] [phone].    A copy of this note was provided to the patient for reference.       Please do not bring home medications with you on the day of surgery unless otherwise directed by your nurse.  If you are instructed to bring home medications, please give them to your nurse as they will be administered by the nursing staff.    If you have any questions, please call [facility] [phone] or [facility] [phone].    A copy of this note was provided to the patient for reference.

## 2024-09-05 NOTE — PERIOP NOTE
Patient confirms name and . Order to obtain consent found in EHR and matches case posting.    Type 1B surgery,  assessment complete.    Labs per surgeon: CBC, BMP, UA and UCx  Labs per anesthesia protocol: none  EKG: not indicated  Glucose:not indicated    Medication list updated today. Verbal medication list provided by the patient today.Pt answered medical and surgical history questions to the best of their ability.     Hibiclens/Dynahex antiseptic wash and instructions given per hospital policy.    Patient provided with and instructed on educational handouts including Guide to Surgery, Pain Management, Hand Hygiene, Blood Transfusion Education, and [facility] Brochure.    Patient answered medical/surgical history questions at their best of ability. All prior to admission medications documented in [facility].     Patient instructed on the following:  Arrive at [address] [place] [address] (enter at front entrance by statue regan Reyes). Check in on the left at the Admissions office.    Time of arrival to be called the day before by 1700  NPO after midnight including gum, mints, and ice chips & NO TOBACCO.  Responsible adult must drive patient to the hospital, stay during surgery, and patient will require supervision 24 hours after anesthesia.  Use antiseptic wash in shower the night before surgery and on the morning of surgery.  Leave all valuables (money and jewelry) at home but bring insurance card and ID on DOS.  Do not wear make-up, nail polish, lotions, cologne, perfumes, powders, or oil on skin. Artificial nails are not permitted.  You may be required to pay a deductible or co-pay on the day of your procedure. You can pre-pay by calling [phone] if your surgery is at the [facility] or [phone] if your surgery is at the [facility].  You will received a call from the pre-op nurse by 5 pm on the business day prior to the scheduled procedure. If you have not spoken with a nurse,

## 2024-09-07 LAB
BACTERIA SPEC CULT: NORMAL
BACTERIA SPEC CULT: NORMAL
SERVICE CMNT-IMP: NORMAL

## 2024-09-11 ENCOUNTER — ANESTHESIA EVENT (OUTPATIENT)
Dept: SURGERY | Age: 64
End: 2024-09-11
Payer: COMMERCIAL

## 2024-09-12 ENCOUNTER — ANESTHESIA (OUTPATIENT)
Dept: SURGERY | Age: 64
End: 2024-09-12
Payer: COMMERCIAL

## 2024-09-12 ENCOUNTER — HOSPITAL ENCOUNTER (OUTPATIENT)
Age: 64
Setting detail: OBSERVATION
Discharge: HOME | End: 2024-09-13
Attending: UROLOGY | Admitting: UROLOGY
Payer: COMMERCIAL

## 2024-09-12 DIAGNOSIS — N39.3 MALE STRESS INCONTINENCE: Primary | ICD-10-CM

## 2024-09-12 PROCEDURE — A4217 STERILE WATER/SALINE, 500 ML: HCPCS | Performed by: UROLOGY

## 2024-09-12 PROCEDURE — G0378 HOSPITAL OBSERVATION PER HR: HCPCS

## 2024-09-12 PROCEDURE — 53445 INSERT URO/VES NCK SPHINCTER: CPT | Performed by: UROLOGY

## 2024-09-12 PROCEDURE — 6370000000 HC RX 637 (ALT 250 FOR IP): Performed by: ANESTHESIOLOGY

## 2024-09-12 PROCEDURE — 2580000003 HC RX 258: Performed by: UROLOGY

## 2024-09-12 PROCEDURE — 6360000002 HC RX W HCPCS: Performed by: UROLOGY

## 2024-09-12 PROCEDURE — 3700000000 HC ANESTHESIA ATTENDED CARE: Performed by: UROLOGY

## 2024-09-12 PROCEDURE — 7100000000 HC PACU RECOVERY - FIRST 15 MIN: Performed by: UROLOGY

## 2024-09-12 PROCEDURE — 3600000003 HC SURGERY LEVEL 3 BASE: Performed by: UROLOGY

## 2024-09-12 PROCEDURE — 6360000002 HC RX W HCPCS

## 2024-09-12 PROCEDURE — 6370000000 HC RX 637 (ALT 250 FOR IP): Performed by: UROLOGY

## 2024-09-12 PROCEDURE — 2720000010 HC SURG SUPPLY STERILE: Performed by: UROLOGY

## 2024-09-12 PROCEDURE — 3600000013 HC SURGERY LEVEL 3 ADDTL 15MIN: Performed by: UROLOGY

## 2024-09-12 PROCEDURE — C1815 PROS, URINARY SPH, IMP: HCPCS | Performed by: UROLOGY

## 2024-09-12 PROCEDURE — 2580000003 HC RX 258: Performed by: ANESTHESIOLOGY

## 2024-09-12 PROCEDURE — 2500000003 HC RX 250 WO HCPCS

## 2024-09-12 PROCEDURE — 3700000001 HC ADD 15 MINUTES (ANESTHESIA): Performed by: UROLOGY

## 2024-09-12 PROCEDURE — 7100000001 HC PACU RECOVERY - ADDTL 15 MIN: Performed by: UROLOGY

## 2024-09-12 PROCEDURE — 2709999900 HC NON-CHARGEABLE SUPPLY: Performed by: UROLOGY

## 2024-09-12 DEVICE — OCCLUSIVE CUFF WITH INHIBIZONE
Type: IMPLANTABLE DEVICE | Site: URETHRA | Status: FUNCTIONAL
Brand: AMS 800 ARTIFICIAL URINARY SPHINCTER

## 2024-09-12 RX ORDER — SODIUM CHLORIDE 0.9 % (FLUSH) 0.9 %
5-40 SYRINGE (ML) INJECTION EVERY 12 HOURS SCHEDULED
Status: DISCONTINUED | OUTPATIENT
Start: 2024-09-12 | End: 2024-09-12 | Stop reason: HOSPADM

## 2024-09-12 RX ORDER — OXYCODONE HYDROCHLORIDE 5 MG/1
10 TABLET ORAL PRN
Status: COMPLETED | OUTPATIENT
Start: 2024-09-12 | End: 2024-09-12

## 2024-09-12 RX ORDER — SODIUM CHLORIDE 9 MG/ML
INJECTION, SOLUTION INTRAVENOUS CONTINUOUS
Status: DISCONTINUED | OUTPATIENT
Start: 2024-09-12 | End: 2024-09-13 | Stop reason: HOSPADM

## 2024-09-12 RX ORDER — SODIUM CHLORIDE 9 MG/ML
INJECTION, SOLUTION INTRAVENOUS PRN
Status: DISCONTINUED | OUTPATIENT
Start: 2024-09-12 | End: 2024-09-12 | Stop reason: HOSPADM

## 2024-09-12 RX ORDER — MIDAZOLAM HYDROCHLORIDE 2 MG/2ML
2 INJECTION, SOLUTION INTRAMUSCULAR; INTRAVENOUS
Status: DISCONTINUED | OUTPATIENT
Start: 2024-09-12 | End: 2024-09-12 | Stop reason: HOSPADM

## 2024-09-12 RX ORDER — OXYCODONE HYDROCHLORIDE 5 MG/1
5 TABLET ORAL PRN
Status: COMPLETED | OUTPATIENT
Start: 2024-09-12 | End: 2024-09-12

## 2024-09-12 RX ORDER — OXYCODONE HYDROCHLORIDE 5 MG/1
10 TABLET ORAL EVERY 4 HOURS PRN
Status: DISCONTINUED | OUTPATIENT
Start: 2024-09-12 | End: 2024-09-13 | Stop reason: HOSPADM

## 2024-09-12 RX ORDER — GLYCOPYRROLATE 0.2 MG/ML
INJECTION INTRAMUSCULAR; INTRAVENOUS
Status: DISCONTINUED | OUTPATIENT
Start: 2024-09-12 | End: 2024-09-12 | Stop reason: SDUPTHER

## 2024-09-12 RX ORDER — SODIUM CHLORIDE, SODIUM LACTATE, POTASSIUM CHLORIDE, CALCIUM CHLORIDE 600; 310; 30; 20 MG/100ML; MG/100ML; MG/100ML; MG/100ML
INJECTION, SOLUTION INTRAVENOUS CONTINUOUS
Status: DISCONTINUED | OUTPATIENT
Start: 2024-09-12 | End: 2024-09-12 | Stop reason: HOSPADM

## 2024-09-12 RX ORDER — HYDROMORPHONE HYDROCHLORIDE 2 MG/ML
0.5 INJECTION, SOLUTION INTRAMUSCULAR; INTRAVENOUS; SUBCUTANEOUS EVERY 5 MIN PRN
Status: DISCONTINUED | OUTPATIENT
Start: 2024-09-12 | End: 2024-09-12 | Stop reason: HOSPADM

## 2024-09-12 RX ORDER — NEOSTIGMINE METHYLSULFATE 1 MG/ML
INJECTION INTRAVENOUS
Status: DISCONTINUED | OUTPATIENT
Start: 2024-09-12 | End: 2024-09-12 | Stop reason: SDUPTHER

## 2024-09-12 RX ORDER — LIDOCAINE HYDROCHLORIDE 10 MG/ML
1 INJECTION, SOLUTION INFILTRATION; PERINEURAL
Status: DISCONTINUED | OUTPATIENT
Start: 2024-09-12 | End: 2024-09-12 | Stop reason: HOSPADM

## 2024-09-12 RX ORDER — ROCURONIUM BROMIDE 10 MG/ML
INJECTION, SOLUTION INTRAVENOUS
Status: DISCONTINUED | OUTPATIENT
Start: 2024-09-12 | End: 2024-09-12 | Stop reason: SDUPTHER

## 2024-09-12 RX ORDER — SODIUM CHLORIDE 0.9 % (FLUSH) 0.9 %
5-40 SYRINGE (ML) INJECTION PRN
Status: DISCONTINUED | OUTPATIENT
Start: 2024-09-12 | End: 2024-09-12 | Stop reason: HOSPADM

## 2024-09-12 RX ORDER — PHENYLEPHRINE HYDROCHLORIDE 10 MG/ML
INJECTION INTRAVENOUS
Status: DISCONTINUED | OUTPATIENT
Start: 2024-09-12 | End: 2024-09-12 | Stop reason: SDUPTHER

## 2024-09-12 RX ORDER — EPHEDRINE SULFATE 5 MG/ML
INJECTION INTRAVENOUS
Status: DISCONTINUED | OUTPATIENT
Start: 2024-09-12 | End: 2024-09-12 | Stop reason: SDUPTHER

## 2024-09-12 RX ORDER — MIDAZOLAM HYDROCHLORIDE 1 MG/ML
INJECTION INTRAMUSCULAR; INTRAVENOUS
Status: DISCONTINUED | OUTPATIENT
Start: 2024-09-12 | End: 2024-09-12 | Stop reason: SDUPTHER

## 2024-09-12 RX ORDER — LIDOCAINE HYDROCHLORIDE 20 MG/ML
INJECTION, SOLUTION EPIDURAL; INFILTRATION; INTRACAUDAL; PERINEURAL
Status: DISCONTINUED | OUTPATIENT
Start: 2024-09-12 | End: 2024-09-12 | Stop reason: SDUPTHER

## 2024-09-12 RX ORDER — DEXAMETHASONE SODIUM PHOSPHATE 4 MG/ML
INJECTION, SOLUTION INTRA-ARTICULAR; INTRALESIONAL; INTRAMUSCULAR; INTRAVENOUS; SOFT TISSUE
Status: DISCONTINUED | OUTPATIENT
Start: 2024-09-12 | End: 2024-09-12 | Stop reason: SDUPTHER

## 2024-09-12 RX ORDER — DIPHENHYDRAMINE HYDROCHLORIDE 50 MG/ML
12.5 INJECTION INTRAMUSCULAR; INTRAVENOUS
Status: DISCONTINUED | OUTPATIENT
Start: 2024-09-12 | End: 2024-09-12 | Stop reason: HOSPADM

## 2024-09-12 RX ORDER — ONDANSETRON 2 MG/ML
4 INJECTION INTRAMUSCULAR; INTRAVENOUS
Status: DISCONTINUED | OUTPATIENT
Start: 2024-09-12 | End: 2024-09-12 | Stop reason: HOSPADM

## 2024-09-12 RX ORDER — ONDANSETRON 2 MG/ML
INJECTION INTRAMUSCULAR; INTRAVENOUS
Status: DISCONTINUED | OUTPATIENT
Start: 2024-09-12 | End: 2024-09-12 | Stop reason: SDUPTHER

## 2024-09-12 RX ORDER — PROPOFOL 10 MG/ML
INJECTION, EMULSION INTRAVENOUS
Status: DISCONTINUED | OUTPATIENT
Start: 2024-09-12 | End: 2024-09-12 | Stop reason: SDUPTHER

## 2024-09-12 RX ORDER — OXYCODONE HYDROCHLORIDE 5 MG/1
5 TABLET ORAL EVERY 4 HOURS PRN
Status: DISCONTINUED | OUTPATIENT
Start: 2024-09-12 | End: 2024-09-13 | Stop reason: HOSPADM

## 2024-09-12 RX ORDER — NALOXONE HYDROCHLORIDE 0.4 MG/ML
INJECTION, SOLUTION INTRAMUSCULAR; INTRAVENOUS; SUBCUTANEOUS PRN
Status: DISCONTINUED | OUTPATIENT
Start: 2024-09-12 | End: 2024-09-12 | Stop reason: HOSPADM

## 2024-09-12 RX ORDER — PROCHLORPERAZINE EDISYLATE 5 MG/ML
5 INJECTION INTRAMUSCULAR; INTRAVENOUS
Status: DISCONTINUED | OUTPATIENT
Start: 2024-09-12 | End: 2024-09-12 | Stop reason: HOSPADM

## 2024-09-12 RX ORDER — GENTAMICIN SULFATE 80 MG/100ML
80 INJECTION, SOLUTION INTRAVENOUS EVERY 12 HOURS
Status: COMPLETED | OUTPATIENT
Start: 2024-09-12 | End: 2024-09-13

## 2024-09-12 RX ORDER — FENTANYL CITRATE 50 UG/ML
INJECTION, SOLUTION INTRAMUSCULAR; INTRAVENOUS
Status: DISCONTINUED | OUTPATIENT
Start: 2024-09-12 | End: 2024-09-12 | Stop reason: SDUPTHER

## 2024-09-12 RX ORDER — ONDANSETRON 2 MG/ML
4 INJECTION INTRAMUSCULAR; INTRAVENOUS EVERY 6 HOURS PRN
Status: DISCONTINUED | OUTPATIENT
Start: 2024-09-12 | End: 2024-09-13 | Stop reason: HOSPADM

## 2024-09-12 RX ORDER — ACETAMINOPHEN 500 MG
1000 TABLET ORAL ONCE
Status: COMPLETED | OUTPATIENT
Start: 2024-09-12 | End: 2024-09-12

## 2024-09-12 RX ORDER — ACETAMINOPHEN 160 MG/5ML
650 SUSPENSION ORAL EVERY 4 HOURS PRN
Status: DISCONTINUED | OUTPATIENT
Start: 2024-09-12 | End: 2024-09-13 | Stop reason: HOSPADM

## 2024-09-12 RX ORDER — HYDROMORPHONE HYDROCHLORIDE 1 MG/ML
0.5 INJECTION, SOLUTION INTRAMUSCULAR; INTRAVENOUS; SUBCUTANEOUS
Status: DISCONTINUED | OUTPATIENT
Start: 2024-09-12 | End: 2024-09-13 | Stop reason: HOSPADM

## 2024-09-12 RX ORDER — GENTAMICIN SULFATE 60 MG/50ML
120 INJECTION, SOLUTION INTRAVENOUS
Status: DISCONTINUED | OUTPATIENT
Start: 2024-09-12 | End: 2024-09-12 | Stop reason: SDUPTHER

## 2024-09-12 RX ADMIN — PHENYLEPHRINE HYDROCHLORIDE 100 MCG: 10 INJECTION INTRAVENOUS at 09:22

## 2024-09-12 RX ADMIN — OXYCODONE HYDROCHLORIDE 10 MG: 5 TABLET ORAL at 22:00

## 2024-09-12 RX ADMIN — PHENYLEPHRINE HYDROCHLORIDE 200 MCG: 10 INJECTION INTRAVENOUS at 08:43

## 2024-09-12 RX ADMIN — PHENYLEPHRINE HYDROCHLORIDE 200 MCG: 10 INJECTION INTRAVENOUS at 08:05

## 2024-09-12 RX ADMIN — SODIUM CHLORIDE: 9 INJECTION, SOLUTION INTRAVENOUS at 19:50

## 2024-09-12 RX ADMIN — GENTAMICIN SULFATE 120 MG: 40 INJECTION, SOLUTION INTRAMUSCULAR; INTRAVENOUS at 08:21

## 2024-09-12 RX ADMIN — PHENYLEPHRINE HYDROCHLORIDE 200 MCG: 10 INJECTION INTRAVENOUS at 08:12

## 2024-09-12 RX ADMIN — ONDANSETRON 4 MG: 2 INJECTION INTRAMUSCULAR; INTRAVENOUS at 08:07

## 2024-09-12 RX ADMIN — PHENYLEPHRINE HYDROCHLORIDE 200 MCG: 10 INJECTION INTRAVENOUS at 08:22

## 2024-09-12 RX ADMIN — VANCOMYCIN HYDROCHLORIDE 1000 MG: 1 INJECTION, POWDER, LYOPHILIZED, FOR SOLUTION INTRAVENOUS at 19:56

## 2024-09-12 RX ADMIN — SODIUM CHLORIDE, SODIUM LACTATE, POTASSIUM CHLORIDE, AND CALCIUM CHLORIDE: 600; 310; 30; 20 INJECTION, SOLUTION INTRAVENOUS at 08:47

## 2024-09-12 RX ADMIN — FENTANYL CITRATE 50 MCG: 50 INJECTION, SOLUTION INTRAMUSCULAR; INTRAVENOUS at 07:51

## 2024-09-12 RX ADMIN — PROPOFOL 160 MG: 10 INJECTION, EMULSION INTRAVENOUS at 07:51

## 2024-09-12 RX ADMIN — FENTANYL CITRATE 50 MCG: 50 INJECTION, SOLUTION INTRAMUSCULAR; INTRAVENOUS at 07:57

## 2024-09-12 RX ADMIN — ROCURONIUM BROMIDE 15 MG: 10 INJECTION, SOLUTION INTRAVENOUS at 08:51

## 2024-09-12 RX ADMIN — ROCURONIUM BROMIDE 10 MG: 10 INJECTION, SOLUTION INTRAVENOUS at 07:58

## 2024-09-12 RX ADMIN — NEOSTIGMINE METHYLSULFATE 5 MG: 1.02 INJECTION INTRAVENOUS at 10:13

## 2024-09-12 RX ADMIN — PHENYLEPHRINE HYDROCHLORIDE 100 MCG: 10 INJECTION INTRAVENOUS at 08:30

## 2024-09-12 RX ADMIN — OXYCODONE HYDROCHLORIDE 5 MG: 5 TABLET ORAL at 19:48

## 2024-09-12 RX ADMIN — EPHEDRINE SULFATE 5 MG: 5 INJECTION INTRAVENOUS at 08:40

## 2024-09-12 RX ADMIN — SODIUM CHLORIDE: 9 INJECTION, SOLUTION INTRAVENOUS at 13:21

## 2024-09-12 RX ADMIN — Medication 1500 MG: at 06:23

## 2024-09-12 RX ADMIN — GLYCOPYRROLATE 0.8 MG: 0.2 INJECTION INTRAMUSCULAR; INTRAVENOUS at 10:13

## 2024-09-12 RX ADMIN — GENTAMICIN SULFATE 80 MG: 80 INJECTION, SOLUTION INTRAVENOUS at 21:54

## 2024-09-12 RX ADMIN — LIDOCAINE HYDROCHLORIDE 100 MG: 20 INJECTION, SOLUTION EPIDURAL; INFILTRATION; INTRACAUDAL; PERINEURAL at 07:51

## 2024-09-12 RX ADMIN — ONDANSETRON 4 MG: 2 INJECTION INTRAMUSCULAR; INTRAVENOUS at 09:43

## 2024-09-12 RX ADMIN — SUGAMMADEX 200 MG: 100 INJECTION, SOLUTION INTRAVENOUS at 10:35

## 2024-09-12 RX ADMIN — Medication 1500 MG: at 08:02

## 2024-09-12 RX ADMIN — ROCURONIUM BROMIDE 40 MG: 10 INJECTION, SOLUTION INTRAVENOUS at 07:52

## 2024-09-12 RX ADMIN — PHENYLEPHRINE HYDROCHLORIDE 100 MCG: 10 INJECTION INTRAVENOUS at 08:02

## 2024-09-12 RX ADMIN — ACETAMINOPHEN 1000 MG: 500 TABLET, FILM COATED ORAL at 06:24

## 2024-09-12 RX ADMIN — SODIUM CHLORIDE, SODIUM LACTATE, POTASSIUM CHLORIDE, AND CALCIUM CHLORIDE: 600; 310; 30; 20 INJECTION, SOLUTION INTRAVENOUS at 06:24

## 2024-09-12 RX ADMIN — DEXAMETHASONE SODIUM PHOSPHATE 4 MG: 4 INJECTION INTRA-ARTICULAR; INTRALESIONAL; INTRAMUSCULAR; INTRAVENOUS; SOFT TISSUE at 08:07

## 2024-09-12 RX ADMIN — OXYCODONE HYDROCHLORIDE 5 MG: 5 TABLET ORAL at 11:51

## 2024-09-12 RX ADMIN — MIDAZOLAM 2 MG: 1 INJECTION INTRAMUSCULAR; INTRAVENOUS at 07:41

## 2024-09-12 RX ADMIN — EPHEDRINE SULFATE 5 MG: 5 INJECTION INTRAVENOUS at 09:22

## 2024-09-12 ASSESSMENT — PAIN - FUNCTIONAL ASSESSMENT
PAIN_FUNCTIONAL_ASSESSMENT: ACTIVITIES ARE NOT PREVENTED
PAIN_FUNCTIONAL_ASSESSMENT: 0-10
PAIN_FUNCTIONAL_ASSESSMENT: PREVENTS OR INTERFERES SOME ACTIVE ACTIVITIES AND ADLS
PAIN_FUNCTIONAL_ASSESSMENT: NONE - DENIES PAIN

## 2024-09-12 ASSESSMENT — PAIN DESCRIPTION - DESCRIPTORS
DESCRIPTORS: ACHING;DISCOMFORT;SORE
DESCRIPTORS: ACHING;DISCOMFORT;SORE

## 2024-09-12 ASSESSMENT — PAIN SCALES - GENERAL
PAINLEVEL_OUTOF10: 7
PAINLEVEL_OUTOF10: 5
PAINLEVEL_OUTOF10: 3
PAINLEVEL_OUTOF10: 5

## 2024-09-12 ASSESSMENT — PAIN DESCRIPTION - LOCATION
LOCATION: ABDOMEN;PENIS
LOCATION: INCISION

## 2024-09-13 VITALS
HEART RATE: 94 BPM | HEIGHT: 71 IN | SYSTOLIC BLOOD PRESSURE: 123 MMHG | OXYGEN SATURATION: 90 % | WEIGHT: 223.8 LBS | RESPIRATION RATE: 20 BRPM | TEMPERATURE: 97.5 F | BODY MASS INDEX: 31.33 KG/M2 | DIASTOLIC BLOOD PRESSURE: 80 MMHG

## 2024-09-13 PROCEDURE — 6370000000 HC RX 637 (ALT 250 FOR IP): Performed by: UROLOGY

## 2024-09-13 PROCEDURE — G0378 HOSPITAL OBSERVATION PER HR: HCPCS

## 2024-09-13 PROCEDURE — 99024 POSTOP FOLLOW-UP VISIT: CPT | Performed by: PHYSICIAN ASSISTANT

## 2024-09-13 PROCEDURE — 2580000003 HC RX 258: Performed by: UROLOGY

## 2024-09-13 RX ORDER — OXYCODONE AND ACETAMINOPHEN 10; 325 MG/1; MG/1
0.5 TABLET ORAL EVERY 6 HOURS PRN
Qty: 6 TABLET | Refills: 0 | Status: SHIPPED | OUTPATIENT
Start: 2024-09-13 | End: 2024-09-16

## 2024-09-13 RX ORDER — SULFAMETHOXAZOLE/TRIMETHOPRIM 800-160 MG
1 TABLET ORAL 2 TIMES DAILY
Qty: 14 TABLET | Refills: 0 | Status: SHIPPED | OUTPATIENT
Start: 2024-09-13 | End: 2024-09-20

## 2024-09-13 RX ADMIN — SODIUM CHLORIDE: 9 INJECTION, SOLUTION INTRAVENOUS at 03:22

## 2024-09-13 RX ADMIN — ACETAMINOPHEN 650 MG: 325 SUSPENSION ORAL at 00:33

## 2024-09-13 ASSESSMENT — PAIN DESCRIPTION - DESCRIPTORS: DESCRIPTORS: ACHING;DISCOMFORT;SORE

## 2024-09-13 ASSESSMENT — PAIN SCALES - GENERAL
PAINLEVEL_OUTOF10: 2
PAINLEVEL_OUTOF10: 4

## 2024-09-13 ASSESSMENT — PAIN - FUNCTIONAL ASSESSMENT: PAIN_FUNCTIONAL_ASSESSMENT: ACTIVITIES ARE NOT PREVENTED

## 2024-09-13 ASSESSMENT — PAIN DESCRIPTION - LOCATION: LOCATION: HEAD

## 2024-09-16 ENCOUNTER — CARE COORDINATION (OUTPATIENT)
Dept: CARE COORDINATION | Facility: CLINIC | Age: 64
End: 2024-09-16

## 2024-09-17 ENCOUNTER — CARE COORDINATION (OUTPATIENT)
Dept: CARE COORDINATION | Facility: CLINIC | Age: 64
End: 2024-09-17

## 2024-09-25 ENCOUNTER — OFFICE VISIT (OUTPATIENT)
Dept: INTERNAL MEDICINE CLINIC | Facility: CLINIC | Age: 64
End: 2024-09-25
Payer: COMMERCIAL

## 2024-09-25 VITALS
DIASTOLIC BLOOD PRESSURE: 89 MMHG | TEMPERATURE: 97.2 F | WEIGHT: 224.2 LBS | HEIGHT: 71 IN | SYSTOLIC BLOOD PRESSURE: 115 MMHG | HEART RATE: 91 BPM | RESPIRATION RATE: 16 BRPM | BODY MASS INDEX: 31.39 KG/M2

## 2024-09-25 DIAGNOSIS — D12.4 ADENOMATOUS POLYP OF DESCENDING COLON: ICD-10-CM

## 2024-09-25 DIAGNOSIS — N39.3 MALE URINARY STRESS INCONTINENCE: ICD-10-CM

## 2024-09-25 DIAGNOSIS — I10 PRIMARY HYPERTENSION: ICD-10-CM

## 2024-09-25 DIAGNOSIS — C61 PROSTATE CANCER (HCC): Primary | ICD-10-CM

## 2024-09-25 DIAGNOSIS — I10 ESSENTIAL (PRIMARY) HYPERTENSION: ICD-10-CM

## 2024-09-25 PROCEDURE — 99214 OFFICE O/P EST MOD 30 MIN: CPT | Performed by: INTERNAL MEDICINE

## 2024-09-25 PROCEDURE — 3074F SYST BP LT 130 MM HG: CPT | Performed by: INTERNAL MEDICINE

## 2024-09-25 PROCEDURE — 3079F DIAST BP 80-89 MM HG: CPT | Performed by: INTERNAL MEDICINE

## 2024-09-26 ENCOUNTER — TELEPHONE (OUTPATIENT)
Dept: UROLOGY | Age: 64
End: 2024-09-26

## 2024-09-26 ENCOUNTER — OFFICE VISIT (OUTPATIENT)
Dept: UROLOGY | Age: 64
End: 2024-09-26
Payer: COMMERCIAL

## 2024-09-26 DIAGNOSIS — C61 PROSTATE CANCER (HCC): ICD-10-CM

## 2024-09-26 DIAGNOSIS — N39.3 STRESS INCONTINENCE, MALE: Primary | ICD-10-CM

## 2024-09-26 PROCEDURE — 81003 URINALYSIS AUTO W/O SCOPE: CPT | Performed by: NURSE PRACTITIONER

## 2024-09-26 NOTE — TELEPHONE ENCOUNTER
Return for carter 2-4 weeks gaetano or Richard.   Pt needs a 2 week appt per carter can you find a slot and call pt Thanks

## 2024-10-25 ENCOUNTER — OFFICE VISIT (OUTPATIENT)
Dept: UROLOGY | Age: 64
End: 2024-10-25
Payer: COMMERCIAL

## 2024-10-25 DIAGNOSIS — R97.20 ELEVATED PSA: ICD-10-CM

## 2024-10-25 DIAGNOSIS — C61 PROSTATE CANCER (HCC): ICD-10-CM

## 2024-10-25 DIAGNOSIS — N39.3 STRESS INCONTINENCE, MALE: Primary | ICD-10-CM

## 2024-10-25 LAB
BILIRUBIN, URINE, POC: NEGATIVE
BLOOD URINE, POC: NORMAL
GLUCOSE URINE, POC: NEGATIVE MG/DL
KETONES, URINE, POC: NEGATIVE MG/DL
LEUKOCYTE ESTERASE, URINE, POC: NEGATIVE
NITRITE, URINE, POC: NEGATIVE
PH, URINE, POC: 5.5 (ref 4.6–8)
PROTEIN,URINE, POC: NEGATIVE MG/DL
SPECIFIC GRAVITY, URINE, POC: 1.03 (ref 1–1.03)
URINALYSIS CLARITY, POC: NORMAL
URINALYSIS COLOR, POC: NORMAL
UROBILINOGEN, POC: NORMAL MG/DL

## 2024-10-25 PROCEDURE — 99024 POSTOP FOLLOW-UP VISIT: CPT | Performed by: UROLOGY

## 2024-10-25 PROCEDURE — 81003 URINALYSIS AUTO W/O SCOPE: CPT | Performed by: UROLOGY

## 2024-10-25 ASSESSMENT — ENCOUNTER SYMPTOMS
BACK PAIN: 0
NAUSEA: 0

## 2024-10-25 NOTE — PROGRESS NOTES
[facility]rology  [address] [place] [address]  [phone]    Dagoberto Echevarria  : 1960    Chief Complaint   Patient presents with    Follow-up        HPI     Dagoberto Echevarria is a 64 y.o. male   Hx of prostate cancer , s/p prostatectomy in 10-22, Salvage radiation (completed 23) with 2 years ADT  . TNM CLASSIFICATION: pT3, pN0.   Presented with elevated PSA. PSA 7.5 in . No previous values noted.  No voiding problems. No family hx of prostate cancer. No weight loss or bone pain .GARFIELD showed no nodules.   Repeat PSA 8.5 in .  MRI 22:   FINDINGS:   Last PSA: 7.5   Prostate Size: 4.0 x 4.2 x 2.7 cm with a calculated volume of  23.5 mL.   PSA Density: 0.32 ng/mL       BPH: No significant BPH.       Hemorrhage: None.       Peripheral Zone: There is areas of T2 hyperintensity throughout both peripheral   zones. No suspicious lesions are described below.        Transition/Central Zone: No suspicious transition zone lesion.       Lesion # 1   -Size and location: A 1.5 x 1.3 x 1.2 cm T2 hypointense area within the right   peripheral zone of the gland base (series 4 image 15 and series 6 image 16).   -DWI/ADC: Diffusion restriction.   -DCE: The lesion demonstrates enhancement.   -Prostate Margin: No evidence of extracapsular extension.   -PI-RADS Category: 5       Lesion # 2   -Size and location: A 0.9 x 0.7 cm T2 hypointense area within the left   peripheral zone of the mid gland (series 4 image 16).   -DWI/ADC: Mild diffusion restriction.   -DCE: No appreciable asymmetric enhancement.   -Prostate Margin: No evidence of extracapsular extension.   -PI-RADS Category: 3       Seminal Vesicles: The seminal vesicles are unremarkable.       Lymph Nodes: No appreciable pelvic lymphadenopathy.       Other: Bilateral fat-containing inguinal hernias.           Impression   1.  PI-RADS 5 lesion within the right peripheral zone of the gland base,   suspicious for malignancy.   2.

## 2025-03-25 ENCOUNTER — OFFICE VISIT (OUTPATIENT)
Dept: INTERNAL MEDICINE CLINIC | Facility: CLINIC | Age: 65
End: 2025-03-25
Payer: COMMERCIAL

## 2025-03-25 VITALS
SYSTOLIC BLOOD PRESSURE: 120 MMHG | HEART RATE: 78 BPM | RESPIRATION RATE: 16 BRPM | TEMPERATURE: 97.2 F | HEIGHT: 71 IN | BODY MASS INDEX: 32.37 KG/M2 | WEIGHT: 231.2 LBS | DIASTOLIC BLOOD PRESSURE: 85 MMHG

## 2025-03-25 DIAGNOSIS — G89.29 CHRONIC MIDLINE LOW BACK PAIN WITHOUT SCIATICA: ICD-10-CM

## 2025-03-25 DIAGNOSIS — I10 PRIMARY HYPERTENSION: Primary | ICD-10-CM

## 2025-03-25 DIAGNOSIS — R53.83 OTHER FATIGUE: ICD-10-CM

## 2025-03-25 DIAGNOSIS — I10 PRIMARY HYPERTENSION: ICD-10-CM

## 2025-03-25 DIAGNOSIS — D12.4 ADENOMATOUS POLYP OF DESCENDING COLON: ICD-10-CM

## 2025-03-25 DIAGNOSIS — M54.50 CHRONIC MIDLINE LOW BACK PAIN WITHOUT SCIATICA: ICD-10-CM

## 2025-03-25 DIAGNOSIS — C61 PROSTATE CANCER (HCC): ICD-10-CM

## 2025-03-25 DIAGNOSIS — N39.3 STRESS INCONTINENCE, MALE: ICD-10-CM

## 2025-03-25 PROBLEM — R97.20 ELEVATED PSA: Status: RESOLVED | Noted: 2022-07-12 | Resolved: 2025-03-25

## 2025-03-25 LAB
ALBUMIN SERPL-MCNC: 3.9 G/DL (ref 3.2–4.6)
ALBUMIN/GLOB SERPL: 1.2 (ref 1–1.9)
ALP SERPL-CCNC: 86 U/L (ref 40–129)
ALT SERPL-CCNC: 33 U/L (ref 8–55)
ANION GAP SERPL CALC-SCNC: 9 MMOL/L (ref 7–16)
AST SERPL-CCNC: 34 U/L (ref 15–37)
BILIRUB SERPL-MCNC: 0.5 MG/DL (ref 0–1.2)
BUN SERPL-MCNC: 14 MG/DL (ref 8–23)
CALCIUM SERPL-MCNC: 9.2 MG/DL (ref 8.8–10.2)
CHLORIDE SERPL-SCNC: 104 MMOL/L (ref 98–107)
CHOLEST SERPL-MCNC: 136 MG/DL (ref 0–200)
CO2 SERPL-SCNC: 26 MMOL/L (ref 20–29)
CREAT SERPL-MCNC: 0.95 MG/DL (ref 0.8–1.3)
GLOBULIN SER CALC-MCNC: 3.1 G/DL (ref 2.3–3.5)
GLUCOSE SERPL-MCNC: 97 MG/DL (ref 70–99)
HDLC SERPL-MCNC: 34 MG/DL (ref 40–60)
HDLC SERPL: 4.1 (ref 0–5)
LDLC SERPL CALC-MCNC: 79 MG/DL (ref 0–100)
POTASSIUM SERPL-SCNC: 4.2 MMOL/L (ref 3.5–5.1)
PROT SERPL-MCNC: 7 G/DL (ref 6.3–8.2)
SODIUM SERPL-SCNC: 139 MMOL/L (ref 136–145)
TRIGL SERPL-MCNC: 118 MG/DL (ref 0–150)
VLDLC SERPL CALC-MCNC: 24 MG/DL (ref 6–23)

## 2025-03-25 PROCEDURE — 3079F DIAST BP 80-89 MM HG: CPT | Performed by: INTERNAL MEDICINE

## 2025-03-25 PROCEDURE — 99214 OFFICE O/P EST MOD 30 MIN: CPT | Performed by: INTERNAL MEDICINE

## 2025-03-25 PROCEDURE — 3074F SYST BP LT 130 MM HG: CPT | Performed by: INTERNAL MEDICINE

## 2025-03-25 RX ORDER — VALSARTAN 80 MG/1
80 TABLET ORAL DAILY
Qty: 90 TABLET | Refills: 3 | Status: SHIPPED | OUTPATIENT
Start: 2025-03-25

## 2025-03-25 ASSESSMENT — PATIENT HEALTH QUESTIONNAIRE - PHQ9
SUM OF ALL RESPONSES TO PHQ QUESTIONS 1-9: 0
1. LITTLE INTEREST OR PLEASURE IN DOING THINGS: NOT AT ALL
2. FEELING DOWN, DEPRESSED OR HOPELESS: NOT AT ALL
1. LITTLE INTEREST OR PLEASURE IN DOING THINGS: NOT AT ALL
SUM OF ALL RESPONSES TO PHQ9 QUESTIONS 1 & 2: 0
SUM OF ALL RESPONSES TO PHQ QUESTIONS 1-9: 0
SUM OF ALL RESPONSES TO PHQ QUESTIONS 1-9: 0
2. FEELING DOWN, DEPRESSED OR HOPELESS: NOT AT ALL
SUM OF ALL RESPONSES TO PHQ QUESTIONS 1-9: 0

## 2025-03-25 NOTE — PROGRESS NOTES
3/25/2025 10:05 AM  Location:[facility] PHYSICIAN [facility]  [place] [facility]  [place]  Patient #:  [id number]  YOB: 1960          YOUR LAST HEMOGLOBIN A1CS:   No results found for: \"HBA1C\", \"[id number]\"    YOUR LAST LIPID PROFILE:   Lab Results   Component Value Date/Time    CHOL 133 04/27/2022 09:56 AM    HDL 42 04/27/2022 09:56 AM    LDL 74 04/27/2022 09:56 AM    VLDL 17 04/27/2022 09:56 AM         Lab Results   Component Value Date/Time    GFRAA 116 01/06/2021 09:43 AM    BUN 13 09/05/2024 02:21 PM     09/05/2024 02:21 PM    K 4.3 09/05/2024 02:21 PM    CL 99 09/05/2024 02:21 PM    CO2 28 09/05/2024 02:21 PM           History of Present Illness     Chief Complaint   Patient presents with    6 Month Check-up     Pt presents to the office today for a 6 month check-up.       Overall this patient is improved with more energy since his testosterone level has increased.  PSA level was 0.  He is no longer having significant incontinence of urine.  His back pain is tolerable he states  Mr. Echevarria is a 64 y.o. male  who presents for office visit      No Known Allergies  Past Medical History:   Diagnosis Date    Anxiety     Bell's palsy 06/2019    Chronic back pain     Elevated PSA May 2022    Hypertension     medication    Melanoma (HCC)     left forarm, removed    Personal history of COVID-19 2020    was hospitalized 1 week    Prolonged emergence from general anesthesia     Prostate CA (HCC) 09/2022    prostatectomy and radiation    Restless legs syndrome     Suspected sleep apnea     witnessed in hospital, has not been tested     Social History     Socioeconomic History    Marital status:      Spouse name: None    Number of children: None    Years of education: None    Highest education level: None   Tobacco Use    Smoking status: Never    Smokeless tobacco: Never   Vaping Use    Vaping status: Never Used   Substance and Sexual Activity    Alcohol use: Never    Drug use: Never

## 2025-03-26 ENCOUNTER — RESULTS FOLLOW-UP (OUTPATIENT)
Dept: INTERNAL MEDICINE CLINIC | Facility: CLINIC | Age: 65
End: 2025-03-26

## 2025-05-05 ENCOUNTER — CLINICAL SUPPORT (OUTPATIENT)
Dept: UROLOGY | Age: 65
End: 2025-05-05
Payer: COMMERCIAL

## 2025-05-05 DIAGNOSIS — R31.0 GROSS HEMATURIA: Primary | ICD-10-CM

## 2025-05-05 LAB
BILIRUBIN, URINE, POC: ABNORMAL
BLOOD URINE, POC: ABNORMAL
GLUCOSE URINE, POC: NEGATIVE MG/DL
KETONES, URINE, POC: 15 MG/DL
LEUKOCYTE ESTERASE, URINE, POC: ABNORMAL
NITRITE, URINE, POC: NEGATIVE
PH, URINE, POC: 5 (ref 4.6–8)
PROTEIN,URINE, POC: >=300 MG/DL
SPECIFIC GRAVITY, URINE, POC: 1.01 (ref 1–1.03)
URINALYSIS CLARITY, POC: ABNORMAL
URINALYSIS COLOR, POC: ABNORMAL
UROBILINOGEN, POC: ABNORMAL MG/DL

## 2025-05-05 PROCEDURE — 81003 URINALYSIS AUTO W/O SCOPE: CPT | Performed by: UROLOGY

## 2025-05-05 RX ORDER — SULFAMETHOXAZOLE AND TRIMETHOPRIM 800; 160 MG/1; MG/1
1 TABLET ORAL 2 TIMES DAILY
Qty: 14 TABLET | Refills: 0 | Status: SHIPPED | OUTPATIENT
Start: 2025-05-05 | End: 2025-05-12

## 2025-05-05 NOTE — PROGRESS NOTES
Reason for collection: hematuria with clots, increased frequency of urination, burning with urination  Patient #: [id number]     Results for orders placed or performed in visit on 05/05/25   AMB POC URINALYSIS DIP STICK AUTO W/O MICRO    Collection Time: 05/05/25  4:03 PM   Result Value Ref Range    Color (UA POC) Red     Clarity (UA POC) Turbid     Glucose, Urine, POC Negative Negative mg/dL    Bilirubin, Urine, POC Large Negative    KETONES, Urine, POC 15 Negative mg/dL    Specific Gravity, Urine, POC 1.015 1.001 - 1.035    Blood (UA POC) Large     pH, Urine, POC 5 4.6 - 8.0    Protein, Urine, POC >=300 Negative mg/dL    Urobilinogen, POC 4 mg/dL <1.1 mg/dL    Nitrite, Urine, POC Negative Negative    Leukocyte Esterase, Urine, POC Large Negative     UA - Dipstick  Results for orders placed or performed in visit on 05/05/25   AMB POC URINALYSIS DIP STICK AUTO W/O MICRO    Collection Time: 05/05/25  4:03 PM   Result Value Ref Range    Color (UA POC) Red     Clarity (UA POC) Turbid     Glucose, Urine, POC Negative Negative mg/dL    Bilirubin, Urine, POC Large Negative    KETONES, Urine, POC 15 Negative mg/dL    Specific Gravity, Urine, POC 1.015 1.001 - 1.035    Blood (UA POC) Large     pH, Urine, POC 5 4.6 - 8.0    Protein, Urine, POC >=300 Negative mg/dL    Urobilinogen, POC 4 mg/dL <1.1 mg/dL    Nitrite, Urine, POC Negative Negative    Leukocyte Esterase, Urine, POC Large Negative       UA - Micro  WBC - 0  RBC - tntc  Bacteria - 0  Epith - 0      Requested Prescriptions     Signed Prescriptions Disp Refills    sulfamethoxazole-trimethoprim (BACTRIM DS) 800-160 MG per tablet 14 tablet 0     Sig: Take 1 tablet by mouth 2 times daily for 7 days     Plan    Diagnosis Orders   1. Gross hematuria  AMB POC URINALYSIS DIP STICK AUTO W/O MICRO    Culture, Urine    Culture, Urine    sulfamethoxazole-trimethoprim (BACTRIM DS) 800-160 MG per tablet      Called pt, he has hx of RRP, salvage radiotherapy, AUS.  Started

## 2025-05-06 ENCOUNTER — OFFICE VISIT (OUTPATIENT)
Dept: UROLOGY | Age: 65
End: 2025-05-06
Payer: COMMERCIAL

## 2025-05-06 DIAGNOSIS — C61 PROSTATE CANCER (HCC): ICD-10-CM

## 2025-05-06 DIAGNOSIS — R31.0 GROSS HEMATURIA: Primary | ICD-10-CM

## 2025-05-06 PROCEDURE — 99214 OFFICE O/P EST MOD 30 MIN: CPT | Performed by: NURSE PRACTITIONER

## 2025-05-06 ASSESSMENT — ENCOUNTER SYMPTOMS: BACK PAIN: 0

## 2025-05-06 NOTE — PROGRESS NOTES
[facility] Urology  [address] [place] [id number]  [phone]          Dagoberto Echevarria  : 1960    Chief Complaint   Patient presents with    Follow-up     Stress incontinence, male          HPI     Dagoberto Echevarria is a 64 y.o. male Hx of prostate cancer , s/p prostatectomy in 10-22, Salvage radiation (completed 23) with 2 years ADT  . TNM CLASSIFICATION: pT3, pN0.   Presented with elevated PSA. PSA 7.5 in . No previous values noted.  No voiding problems. No family hx of prostate cancer. No weight loss or bone pain .GARFIELD showed no nodules.   Repeat PSA 8.5 in .  MRI 22:   FINDINGS:   Last PSA: 7.5   Prostate Size: 4.0 x 4.2 x 2.7 cm with a calculated volume of  23.5 mL.   PSA Density: 0.32 ng/mL       BPH: No significant BPH.       Hemorrhage: None.       Peripheral Zone: There is areas of T2 hyperintensity throughout both peripheral   zones. No suspicious lesions are described below.        Transition/Central Zone: No suspicious transition zone lesion.       Lesion # 1   -Size and location: A 1.5 x 1.3 x 1.2 cm T2 hypointense area within the right   peripheral zone of the gland base (series 4 image 15 and series 6 image 16).   -DWI/ADC: Diffusion restriction.   -DCE: The lesion demonstrates enhancement.   -Prostate Margin: No evidence of extracapsular extension.   -PI-RADS Category: 5       Lesion # 2   -Size and location: A 0.9 x 0.7 cm T2 hypointense area within the left   peripheral zone of the mid gland (series 4 image 16).   -DWI/ADC: Mild diffusion restriction.   -DCE: No appreciable asymmetric enhancement.   -Prostate Margin: No evidence of extracapsular extension.   -PI-RADS Category: 3       Seminal Vesicles: The seminal vesicles are unremarkable.       Lymph Nodes: No appreciable pelvic lymphadenopathy.       Other: Bilateral fat-containing inguinal hernias.           Impression   1.  PI-RADS 5 lesion within the right peripheral zone of the gland

## 2025-05-07 LAB
BACTERIA SPEC CULT: NORMAL
SERVICE CMNT-IMP: NORMAL

## 2025-05-09 ENCOUNTER — PATIENT MESSAGE (OUTPATIENT)
Dept: UROLOGY | Age: 65
End: 2025-05-09

## 2025-05-09 DIAGNOSIS — R31.0 GROSS HEMATURIA: Primary | ICD-10-CM

## 2025-05-12 ENCOUNTER — TELEPHONE (OUTPATIENT)
Dept: UROLOGY | Age: 65
End: 2025-05-12

## 2025-05-12 NOTE — TELEPHONE ENCOUNTER
[facility]  Dr Guido have a 1040 appt . If he is available. Will cancel appt if not able to make.  -------------------------------------------------  Pt confirmed can make shmuel.

## 2025-05-27 ENCOUNTER — HOSPITAL ENCOUNTER (OUTPATIENT)
Dept: CT IMAGING | Age: 65
Discharge: HOME | End: 2025-05-29
Payer: COMMERCIAL

## 2025-05-27 DIAGNOSIS — R31.0 GROSS HEMATURIA: ICD-10-CM

## 2025-05-27 LAB — CREAT BLD-MCNC: 0.89 MG/DL (ref 0.8–1.5)

## 2025-05-27 PROCEDURE — 82565 ASSAY OF CREATININE: CPT

## 2025-05-27 PROCEDURE — 6360000004 HC RX CONTRAST MEDICATION: Performed by: NURSE PRACTITIONER

## 2025-05-27 PROCEDURE — 74178 CT ABD&PLV WO CNTR FLWD CNTR: CPT

## 2025-05-27 RX ORDER — IOPAMIDOL 755 MG/ML
100 INJECTION, SOLUTION INTRAVASCULAR
Status: COMPLETED | OUTPATIENT
Start: 2025-05-27 | End: 2025-05-27

## 2025-05-27 RX ADMIN — IOPAMIDOL 100 ML: 755 INJECTION, SOLUTION INTRAVENOUS at 11:30

## 2025-06-01 ENCOUNTER — RESULTS FOLLOW-UP (OUTPATIENT)
Dept: UROLOGY | Age: 65
End: 2025-06-01

## 2025-06-04 ENCOUNTER — PROCEDURE VISIT (OUTPATIENT)
Dept: UROLOGY | Age: 65
End: 2025-06-04
Payer: COMMERCIAL

## 2025-06-04 DIAGNOSIS — N39.3 STRESS INCONTINENCE, MALE: ICD-10-CM

## 2025-06-04 DIAGNOSIS — N30.40 RADIATION CYSTITIS: ICD-10-CM

## 2025-06-04 DIAGNOSIS — C61 PROSTATE CANCER (HCC): ICD-10-CM

## 2025-06-04 DIAGNOSIS — R31.0 GROSS HEMATURIA: Primary | ICD-10-CM

## 2025-06-04 LAB
BILIRUBIN, URINE, POC: NEGATIVE
BLOOD URINE, POC: NEGATIVE
GLUCOSE URINE, POC: NEGATIVE MG/DL
KETONES, URINE, POC: NEGATIVE MG/DL
LEUKOCYTE ESTERASE, URINE, POC: NEGATIVE
NITRITE, URINE, POC: NEGATIVE
PH, URINE, POC: 6 (ref 4.6–8)
PROTEIN,URINE, POC: NEGATIVE MG/DL
SPECIFIC GRAVITY, URINE, POC: 1.01 (ref 1–1.03)
URINALYSIS CLARITY, POC: NORMAL
URINALYSIS COLOR, POC: NORMAL
UROBILINOGEN, POC: NORMAL MG/DL

## 2025-06-04 PROCEDURE — 52000 CYSTOURETHROSCOPY: CPT | Performed by: UROLOGY

## 2025-06-04 PROCEDURE — 99213 OFFICE O/P EST LOW 20 MIN: CPT | Performed by: UROLOGY

## 2025-06-04 PROCEDURE — 81003 URINALYSIS AUTO W/O SCOPE: CPT | Performed by: UROLOGY

## 2025-06-04 NOTE — PROGRESS NOTES
radiotherapy.   Current treatment: Salvage radiation (completed 5/25/23) with 2 years ADT  Lupron 2/2/23 (started)  Abiraterone + Prednisone - Started 3/9/23   ???Biochemical recurrent prostate cancer  - 8/4/22 PSA 8.5, T1C, nicole 4+4=8 prostate adenocarcinoma  - s/p RP surgical path with prostate adenoarcinoma, nicole 4+4=9, + margin, + MARIAELENA, PSA mara 0.3  - BCR PSA 0.36 (1/3/23), PSMA with + new R ext ilac node  - Started ADT+zytiga (for 2 years) and RT to pelvic node and prostate bed.    - treatment-related toxicity reviewed today: significant hot flashes, severe fatigue, back pain, paresthesia  - Labs reviewed today: CBC, CMP, PSA  - Imaging reviewed: CT CAP with no evidence of disease recurrence  - Plan:  # restart zytiga at 500mg daily and re-evaluate in 4 weeks with labs  PSA <0.01 in 1-24.   He is on Lupron,  Zytiga and Prednisone until 9-24.   He is having incontinence with minimal activity. No better with PT. No enuresis, no hematuria. Sits to void. Wearing 2 Depends/day. doesn't leak at night.       He would like treatment of his incontinence. Discussed male sling and AUS. He was given info about this.   on 5-16-24 had attempted placement of AUS. Procedure was aborted due to injury of bulbar urethra. He is doing well but leaking more urine.   S/p placement of AUS on 9-12-24. 5.0 cm cuff around the bulbar urethra.   He has no pain.   Began to have gross hematuria on 5--25, urine culture showed no growth.   CT w/wo on 5-27-25: IMPRESSION:     No obstructive uropathy or suspicious solid renal lesion. Simple left renal and  hepatic cysts, no specific follow-up needed     Persistent diffuse circumferential bladder wall thickening with pericystic  inflammation consistent with chronic cystitis. This is unchanged from the  previous study     No free intraperitoneal fluid, air, or suspicious adenopathy, normal appendix  visualized     Retained stool in the colon with scattered diverticula, no CT evidence of

## 2025-06-12 ENCOUNTER — HOSPITAL ENCOUNTER (OUTPATIENT)
Dept: WOUND CARE | Age: 65
Discharge: HOME | End: 2025-06-12
Payer: COMMERCIAL

## 2025-06-12 VITALS
SYSTOLIC BLOOD PRESSURE: 134 MMHG | HEART RATE: 67 BPM | TEMPERATURE: 97.4 F | RESPIRATION RATE: 16 BRPM | DIASTOLIC BLOOD PRESSURE: 89 MMHG

## 2025-06-12 DIAGNOSIS — T66.XXXS LATE EFFECT OF RADIATION: ICD-10-CM

## 2025-06-12 DIAGNOSIS — N30.41 IRRADIATION CYSTITIS WITH HEMATURIA: Primary | ICD-10-CM

## 2025-06-12 PROCEDURE — 99213 OFFICE O/P EST LOW 20 MIN: CPT

## 2025-06-12 PROCEDURE — 99203 OFFICE O/P NEW LOW 30 MIN: CPT | Performed by: FAMILY MEDICINE

## 2025-06-12 NOTE — PROGRESS NOTES
Hyperbaric Oxygen Therapy Consultation  History and Physical    NAME: Dagoberto Echevarria  MEDICAL RECORD NUMBER:  [id number]  AGE: 64 y.o.   GENDER: male  : 1960  EPISODE DATE:  2025  REFERRING PROVIDER:      Minh Guido Jr., MD       Reason for Consult: Assess need for Hyperbaric Oxygen Therapy     HISTORY of PRESENT ILLNESS:  This patient is being evaluated at our [facility] regarding the use of hyperbaric oxygen as adjunctive therapy in the treatment of Soft Tissue Radionecrosis (STRN).  Bladder with radiation cystitis with hematuria.  Patient underwent prostatectomy and postradiation due to prostate cancer.  Patient has developed significant hematuria.  It was confirmed with cystoscopy through urology of radiation cystitis.  Cancer treatment was over a year ago.  Patient was referred to us for possible hyperbaric oxygen treatments by urology    Mr. Echevarria has a past medical history of Anxiety, Bell's palsy, Chronic back pain, Elevated PSA, Hypertension, Melanoma (HCC), Personal history of COVID-19, Prolonged emergence from general anesthesia, Prostate CA ([facility]), Restless legs syndrome, and Suspected sleep apnea.    He has a past surgical history that includes other surgical history; Prostate surgery (N/A, 2022); Prostatectomy (N/A, 10/06/2022); Urological Surgery (N/A, 2024); Lumbar disc surgery (); and Urological Surgery (N/A, 2024).    His family history includes Cancer in his child; Hypertension in his mother; Suicide in his father.     Mr. Echevarria reports that he has never smoked. He has never used smokeless tobacco. He reports that he does not drink alcohol and does not use drugs.     His current medication list consists of     Current Outpatient Medications on File Prior to Encounter   Medication Sig Dispense Refill    valsartan (DIOVAN) 80 MG tablet Take 1 tablet by mouth daily 90 tablet 3     Current Facility-Administered Medications on File Prior to Encounter

## 2025-06-12 NOTE — WOUND CARE
RN HYPERBARIC OXYGEN THERAPY RISK ASSESSMENT TOOL   [facility]     Dagoberto Echevarria  MEDICAL RECORD NUMBER:  [id number]  AGE: 64 y.o.   GENDER: male  : 1960  EPISODE DATE:  2025       PAST MEDICAL HISTORY      Diagnosis Date    Anxiety     Bell's palsy 2019    Chronic back pain     Elevated PSA May 2022    Hypertension     medication    Melanoma (HCC)     left forarm, removed    Personal history of COVID-19     was hospitalized 1 week    Prolonged emergence from general anesthesia     Prostate CA (HCC) 2022    prostatectomy and radiation    Restless legs syndrome     Suspected sleep apnea     witnessed in hospital, has not been tested       PAST SURGICAL HISTORY  Past Surgical History:   Procedure Laterality Date    LUMBAR DISC SURGERY      OTHER SURGICAL HISTORY      surgery for broken nose    PROSTATE SURGERY N/A 2022    MRI FUSION GUIDED BIOPSY performed by Minh Guido Jr., MD at [facility] OR    PROSTATECTOMY N/A 10/06/2022    PROSTATECTOMY RADICAL RETROPUBIC, BILATERAL LYMPHADENECTOMY ERAS performed by Minh Guido Jr., MD at [facility] OR    UROLOGICAL SURGERY N/A 2024    ATTEMPTED PLACEMENT OF ARTIFICIAL URINARY SPHINCTER performed by Minh Guido Jr., MD at [facility] OR    UROLOGICAL SURGERY N/A 2024    PLACEMENT OF ARTIFIIAL URINARY SPHINCTER performed by Minh Guido Jr., MD at [facility] OR       FAMILY HISTORY  Family History   Problem Relation Age of Onset    Hypertension Mother     Suicide Father     Cancer Child         testicular       SOCIAL HISTORY  Social History     Tobacco Use    Smoking status: Never    Smokeless tobacco: Never   Vaping Use    Vaping status: Never Used   Substance Use Topics    Alcohol use: Never    Drug use: Never       ALLERGIES  No Known Allergies    MEDICATIONS  Current Outpatient Medications on File Prior to Encounter   Medication Sig Dispense Refill    valsartan (DIOVAN) 80 MG tablet Take 1 
  Smoking has a negative effect on treatment and may diminish the benefits you may receive.  Smoking within 2 hours prior to your treatment poses additional risk and should be avoided completely.    Drinking alcohol or using illicit drugs may also have a negative effect on your treatment and should be avoided.    Drinking carbonated beverages such as soda pop within 1 hour of your treatment could cause pains in the stomach during the treatment.  Caffeinated beverages or foods containing caffeine should be avoided before your treatment.  Please let us know if we can assist you with seeking help to stop smoking, drinking or using recreational drugs.      PROHIBITED ITEMS INFORMATION REVIEWED: Yes   No wigs, hair spray, hair oils, hair ornaments, creams, lotions, make-up, after shave, perfumes, colognes, chap stick, lip balms, mustache waxes, petroleum products (e.g. Vaseline), baby oil, deodorant or ointments  No nail polish    No synthetic clothing  No nylon hose, underwear, panty hose or bra  No food, gum or candy  No jewelry  No smoking materials such as lighter, cigarettes or matches  No reading material  No hearing aids, non-fixed dentures  No Hard (non-gas permeable) contact lenses  Most dressings are approved to wear into the hyperbaric chamber. Please advise the hyperbaric staff of any new dressings applied to your wound to ensure the new dressings are compatible with hyperbaric oxygen treatments.  No alcohol preps  No heat packs  No street clothes or shoes  No cell phones or other electronics  If it was not a part of you when you were born into this world, if it was not provided by the chamber , then it cannot go into the chamber with you.        Electronically signed by Yahaira Gamboa RN on 6/12/2025 at 8:32 AM

## 2025-06-30 ENCOUNTER — HOSPITAL ENCOUNTER (OUTPATIENT)
Dept: WOUND CARE | Age: 65
Discharge: HOME | End: 2025-06-30
Payer: COMMERCIAL

## 2025-06-30 VITALS
OXYGEN SATURATION: 98 % | RESPIRATION RATE: 18 BRPM | TEMPERATURE: 97.3 F | SYSTOLIC BLOOD PRESSURE: 125 MMHG | DIASTOLIC BLOOD PRESSURE: 85 MMHG | HEART RATE: 70 BPM

## 2025-06-30 DIAGNOSIS — T66.XXXS LATE EFFECT OF RADIATION: ICD-10-CM

## 2025-06-30 DIAGNOSIS — N30.41 IRRADIATION CYSTITIS WITH HEMATURIA: Primary | ICD-10-CM

## 2025-06-30 PROCEDURE — G0277 HBOT, FULL BODY CHAMBER, 30M: HCPCS

## 2025-06-30 PROCEDURE — 99183 HYPERBARIC OXYGEN THERAPY: CPT | Performed by: FAMILY MEDICINE

## 2025-06-30 ASSESSMENT — PAIN SCALES - GENERAL
PAINLEVEL_OUTOF10: 0
PAINLEVEL_OUTOF10: 0

## 2025-06-30 NOTE — PROGRESS NOTES
HBO PROGRESS NOTE      NAME: Dagoberto Echevarrai  MEDICAL RECORD NUMBER:  [id number]  AGE: 65 y.o.   GENDER: male  : 1960  EPISODE DATE:  2025     Subjective     HBO Treatment Number: 1 out of Total Treatments: 40    HBO Diagnosis:             Indications: Radiation Cystitis w/Hematuria    Safety checks performed prior to treatment.  See doc flowsheets for documentation.    Objective        No results for input(s): \"POCGLU\" in the last 72 hours.  Pre treatment Vital Signs       Temp: 97.3 °F (36.3 °C)     Pulse: 80     Respirations: 18     BP: (!) 136/95       Post treatment Vital Signs  Temp: 97.3 °F (36.3 °C)  Pulse: 70  Respirations: 18  BP: 125/85    Assessment        HBO Diagnosis:   Problem List Items Addressed This Visit          Genitourinary    Irradiation cystitis with hematuria - Primary (Chronic)    Relevant Orders    NURSING COMMUNICATION 1    Hyperbaric Oxygen Treatment       Other    * (Principal) Late effect of radiation (Chronic)    Relevant Orders    NURSING COMMUNICATION 1    Hyperbaric Oxygen Treatment       Physical Exam        General Appearance:  alert and oriented to person, place and time, well-developed and well-nourished, in no acute distress    Pre Tympanic Membrane Assessment:  Right: Normal  Left: Normal    Post Tympanic Membrane Assessment:  Left: Grade I  Right: Grade I    Pulmonary/Chest:  clear to auscultation bilaterally- no wheezes, rales or rhonchi, normal air movement, no respiratory distress    Cardiovascular:  normal    Plan        Patient placed in a full body Monoplace Chamber #: [id number]  Treatment Start Time: 0908     Pressure Reached Time: 0917  EMILY : 2  Number of Air Breaks:        Decompression Time: 1049   Treatment End Time: 1055  Length of Treatment: 90 Minutes  Symptoms Noted During Treatment: None  Total Treatment Time (min): 107    Treatment Completion Status: Treatment completed without issue (D/C instructions Reviewed no questions)    I was present on

## 2025-07-01 ENCOUNTER — HOSPITAL ENCOUNTER (OUTPATIENT)
Dept: WOUND CARE | Age: 65
Discharge: HOME | End: 2025-07-01
Payer: COMMERCIAL

## 2025-07-01 VITALS
TEMPERATURE: 98.1 F | DIASTOLIC BLOOD PRESSURE: 98 MMHG | RESPIRATION RATE: 18 BRPM | HEART RATE: 86 BPM | OXYGEN SATURATION: 100 % | SYSTOLIC BLOOD PRESSURE: 133 MMHG

## 2025-07-01 DIAGNOSIS — N30.41 IRRADIATION CYSTITIS WITH HEMATURIA: Primary | ICD-10-CM

## 2025-07-01 DIAGNOSIS — T66.XXXS LATE EFFECT OF RADIATION: ICD-10-CM

## 2025-07-01 PROCEDURE — G0277 HBOT, FULL BODY CHAMBER, 30M: HCPCS

## 2025-07-01 PROCEDURE — 99183 HYPERBARIC OXYGEN THERAPY: CPT | Performed by: FAMILY MEDICINE

## 2025-07-01 ASSESSMENT — PAIN SCALES - GENERAL: PAINLEVEL_OUTOF10: 0

## 2025-07-02 ENCOUNTER — OFFICE VISIT (OUTPATIENT)
Dept: ENT CLINIC | Age: 65
End: 2025-07-02
Payer: COMMERCIAL

## 2025-07-02 ENCOUNTER — HOSPITAL ENCOUNTER (OUTPATIENT)
Dept: WOUND CARE | Age: 65
Discharge: HOME | End: 2025-07-02
Payer: COMMERCIAL

## 2025-07-02 VITALS
HEART RATE: 87 BPM | HEIGHT: 71 IN | BODY MASS INDEX: 32.2 KG/M2 | OXYGEN SATURATION: 97 % | RESPIRATION RATE: 17 BRPM | WEIGHT: 230 LBS

## 2025-07-02 VITALS
DIASTOLIC BLOOD PRESSURE: 90 MMHG | RESPIRATION RATE: 18 BRPM | OXYGEN SATURATION: 99 % | HEART RATE: 77 BPM | TEMPERATURE: 98.1 F | SYSTOLIC BLOOD PRESSURE: 127 MMHG

## 2025-07-02 DIAGNOSIS — H92.03 OTALGIA, BILATERAL: Primary | ICD-10-CM

## 2025-07-02 DIAGNOSIS — T70.29XA BAROTRAUMA, INITIAL ENCOUNTER: ICD-10-CM

## 2025-07-02 DIAGNOSIS — N30.41 IRRADIATION CYSTITIS WITH HEMATURIA: ICD-10-CM

## 2025-07-02 DIAGNOSIS — H69.93 ETD (EUSTACHIAN TUBE DYSFUNCTION), BILATERAL: Primary | ICD-10-CM

## 2025-07-02 DIAGNOSIS — T66.XXXS LATE EFFECT OF RADIATION: Chronic | ICD-10-CM

## 2025-07-02 PROCEDURE — 99183 HYPERBARIC OXYGEN THERAPY: CPT | Performed by: FAMILY MEDICINE

## 2025-07-02 PROCEDURE — 69433 CREATE EARDRUM OPENING: CPT | Performed by: STUDENT IN AN ORGANIZED HEALTH CARE EDUCATION/TRAINING PROGRAM

## 2025-07-02 PROCEDURE — 99244 OFF/OP CNSLTJ NEW/EST MOD 40: CPT | Performed by: STUDENT IN AN ORGANIZED HEALTH CARE EDUCATION/TRAINING PROGRAM

## 2025-07-02 PROCEDURE — G0277 HBOT, FULL BODY CHAMBER, 30M: HCPCS

## 2025-07-02 RX ORDER — OFLOXACIN 3 MG/ML
5 SOLUTION AURICULAR (OTIC) DAILY
Qty: 10 ML | Refills: 0 | Status: SHIPPED | OUTPATIENT
Start: 2025-07-02 | End: 2025-07-05

## 2025-07-02 ASSESSMENT — ENCOUNTER SYMPTOMS
EYE DISCHARGE: 0
NAUSEA: 0
EYE ITCHING: 0
STRIDOR: 0
FACIAL SWELLING: 0
WHEEZING: 0
APNEA: 0
SHORTNESS OF BREATH: 0
DIARRHEA: 0
COUGH: 0
CHOKING: 0
CONSTIPATION: 0
EYE PAIN: 0
SINUS PRESSURE: 0
SINUS PAIN: 0

## 2025-07-02 ASSESSMENT — PAIN SCALES - GENERAL
PAINLEVEL_OUTOF10: 0
PAINLEVEL_OUTOF10: 0

## 2025-07-02 NOTE — PROGRESS NOTES
HPI:    Dagoberto Echevarria is a 65 y.o. male seen New    Chief Complaint   Patient presents with    Ear Problem     Patient presents today with c/o ear pain following HBO treatment . He will need a tube placement .        History of Present Illness  65-year-old male presents for follow-up with new otalgia, eustachian tube dysfunction, and intolerance to hyperbaric oxygen therapy. Here for tube placement assessment, accompanied by wife.    Diagnosed with prostate cancer, leading to radiation-induced bladder damage and hematuria. Undergoing hyperbaric oxygen therapy at [facility]. Experienced ear pain during last visit, no prior ear issues. Hearing impaired due to fluid in ears. Valsalva maneuver ineffective. Pain started in one ear on 06/30/2025, shifted to other ear on 07/01/2025, now bilateral. Next appointment on 07/03/2025.        Past Medical History, Past Surgical History, Family history, Social History, and Medications were all reviewed with the patient today and updated as necessary.     No Known Allergies    Patient Active Problem List   Diagnosis    Prostate cancer (HCC)    Adenomatous polyp of descending colon    Male urinary stress incontinence    Stress incontinence, male    Male stress incontinence    Primary hypertension    Irradiation cystitis with hematuria    Late effect of radiation    Otalgia, bilateral       Current Outpatient Medications   Medication Sig    valsartan (DIOVAN) 80 MG tablet Take 1 tablet by mouth daily     Current Facility-Administered Medications   Medication Dose Route Frequency    sodium chloride flush 0.9 % injection 10 mL  10 mL IntraVENous BID       Past Medical History:   Diagnosis Date    Anxiety     Bell's palsy 06/2019    Chronic back pain     Elevated PSA May 2022    Hypertension     medication    Melanoma (HCC)     left forarm, removed    Personal history of COVID-19 2020    was hospitalized 1 week    Prolonged emergence from general anesthesia     Prostate CA (HCC)

## 2025-07-02 NOTE — PROGRESS NOTES
HBO PROGRESS NOTE      NAME: Dagoberto Echevarria  MEDICAL RECORD NUMBER:  [id number]  AGE: 65 y.o.   GENDER: male  : 1960  EPISODE DATE:  2025     Subjective     HBO Treatment Number: 2 out of Total Treatments: 40    HBO Diagnosis:             Indications: Radiation Cystitis w/Hematuria    Safety checks performed prior to treatment.  See doc flowsheets for documentation.    Objective        No results for input(s): \"POCGLU\" in the last 72 hours.  Pre treatment Vital Signs       Temp: 98.1 °F (36.7 °C)     Pulse: 92     Respirations: 18     BP: 131/85       Post treatment Vital Signs  Temp: 98.1 °F (36.7 °C)  Pulse: 86  Respirations: 18  BP: (!) 133/98    Assessment        HBO Diagnosis:   Problem List Items Addressed This Visit          Genitourinary    Irradiation cystitis with hematuria - Primary (Chronic)       Other    * (Principal) Late effect of radiation (Chronic)       Physical Exam        General Appearance:  alert and oriented to person, place and time, well-developed and well-nourished, in no acute distress    Pre Tympanic Membrane Assessment:  Right: Grade 0  Left: Grade 0    Post Tympanic Membrane Assessment:  Left: Grade I  Right: Grade I    Pulmonary/Chest:  clear to auscultation bilaterally- no wheezes, rales or rhonchi, normal air movement, no respiratory distress    Cardiovascular:  normal    Plan        Patient placed in a full body Monoplace Chamber #: [id number]  Treatment Start Time: 0903     Pressure Reached Time: 0912  EMILY : 2  Number of Air Breaks:        Decompression Time: 1043   Treatment End Time: 1049  Length of Treatment: 90 Minutes  Symptoms Noted During Treatment: None  Total Treatment Time (min): 106    Treatment Completion Status: Treatment completed without issue (d/c instructions reviewed, no questions)    I was present on these premises and immediately available to furnish assistance & direction throughout the HBO Treatment.     Dagoberto Echevarria is a 65 y.o. male  did  46y M pmh herniated lumbar disc presents for eval of back pain. Pt was outside shoveling snow x1mo ago when he rotated his back suddenly and felt a sharp sever pain in his lower back radiating down his RLE, since developing numbness, weakness and swelling. Associated inability to raise his R foot. Denies fever, incontinence, ha, cp, sob, n/v/d/c

## 2025-07-03 ENCOUNTER — HOSPITAL ENCOUNTER (OUTPATIENT)
Dept: WOUND CARE | Age: 65
Discharge: HOME | End: 2025-07-03
Payer: COMMERCIAL

## 2025-07-03 VITALS
TEMPERATURE: 97.5 F | RESPIRATION RATE: 18 BRPM | DIASTOLIC BLOOD PRESSURE: 84 MMHG | OXYGEN SATURATION: 100 % | SYSTOLIC BLOOD PRESSURE: 124 MMHG | HEART RATE: 69 BPM

## 2025-07-03 DIAGNOSIS — N30.41 IRRADIATION CYSTITIS WITH HEMATURIA: Primary | ICD-10-CM

## 2025-07-03 DIAGNOSIS — T66.XXXS LATE EFFECT OF RADIATION: ICD-10-CM

## 2025-07-03 DIAGNOSIS — H92.03 OTALGIA, BILATERAL: ICD-10-CM

## 2025-07-03 PROCEDURE — G0277 HBOT, FULL BODY CHAMBER, 30M: HCPCS

## 2025-07-03 ASSESSMENT — PAIN SCALES - GENERAL
PAINLEVEL_OUTOF10: 0
PAINLEVEL_OUTOF10: 0

## 2025-07-03 NOTE — PROGRESS NOTES
HBO PROGRESS NOTE      NAME: Dagoberto Echevarria  MEDICAL RECORD NUMBER:  [id number]  AGE: 65 y.o.   GENDER: male  : 1960  EPISODE DATE:  2025     Subjective     HBO Treatment Number: 3 out of Total Treatments: 40    HBO Diagnosis:             Indications: Radiation Cystitis w/Hematuria    Safety checks performed prior to treatment.  See doc flowsheets for documentation.    Objective        No results for input(s): \"POCGLU\" in the last 72 hours.  Pre treatment Vital Signs       Temp: 98.1 °F (36.7 °C)     Pulse: 88     Respirations: 18     BP: 120/84       Post treatment Vital Signs  Temp: 98.1 °F (36.7 °C)  Pulse: 77  Respirations: 18  BP: (!) 127/90    Assessment        HBO Diagnosis:   Problem List Items Addressed This Visit          Genitourinary    Irradiation cystitis with hematuria (Chronic)       Other    * (Principal) Late effect of radiation (Chronic)    Relevant Orders    Ambulatory referral to ENT    Otalgia, bilateral - Primary (Chronic)    Relevant Orders    Ambulatory referral to ENT       Physical Exam        General Appearance:  alert and oriented to person, place and time, well-developed and well-nourished, in no acute distress    Pre Tympanic Membrane Assessment:  Right: Grade I  Left: Grade I    Post Tympanic Membrane Assessment:  Left: Grade I  Right: Grade I    Pulmonary/Chest:  clear to auscultation bilaterally- no wheezes, rales or rhonchi, normal air movement, no respiratory distress    Cardiovascular:  normal    Plan        Patient placed in a full body Monoplace Chamber #: [id number]  Treatment Start Time: 0903     Pressure Reached Time: 0912  EMILY : 2  Number of Air Breaks:        Decompression Time: 1043   Treatment End Time: 1049  Length of Treatment: 90 Minutes  Symptoms Noted During Treatment: None  Total Treatment Time (min): 106    Treatment Completion Status: Treatment completed without issue (d/c instructions reviewed, no questions)    I was present on these premises and

## 2025-07-07 ENCOUNTER — HOSPITAL ENCOUNTER (OUTPATIENT)
Dept: WOUND CARE | Age: 65
Discharge: HOME | End: 2025-07-07
Payer: COMMERCIAL

## 2025-07-07 DIAGNOSIS — H92.03 OTALGIA, BILATERAL: ICD-10-CM

## 2025-07-07 DIAGNOSIS — T66.XXXS LATE EFFECT OF RADIATION: ICD-10-CM

## 2025-07-07 DIAGNOSIS — N30.41 IRRADIATION CYSTITIS WITH HEMATURIA: Primary | ICD-10-CM

## 2025-07-07 PROCEDURE — G0277 HBOT, FULL BODY CHAMBER, 30M: HCPCS

## 2025-07-07 PROCEDURE — 99183 HYPERBARIC OXYGEN THERAPY: CPT

## 2025-07-07 ASSESSMENT — VISUAL ACUITY: OU: 1

## 2025-07-07 ASSESSMENT — PAIN SCALES - GENERAL
PAINLEVEL_OUTOF10: 0
PAINLEVEL_OUTOF10: 0

## 2025-07-07 NOTE — PROGRESS NOTES
[facility]  Hyperbaric Oxygen Therapy Progress Note    NAME: Dagoberto Echevarria  MEDICAL RECORD NUMBER: [id number]  AGE: 65 y.o.     GENDER: male    : 1960  EPISODE DATE: 2025   Subjective     HBO Treatment Number: 5 out of Total Treatments: 40.    HBO Diagnosis:   Present on Admission:   Irradiation cystitis with hematuria   Late effect of radiation    Indications: Radiation Cystitis w/Hematuria    Safety checks performed prior to treatment. See documentation flowsheet for details.  Objective        No results for input(s): \"POCGLU\" in the last 72 hours.  Pre treatment Vital Signs       Temp: 97.6 °F (36.4 °C)     Pulse: 68     Respirations: 18     BP: 129/87       Post treatment Vital Signs  Temp: 97.6 °F (36.4 °C)  Pulse: 66  Respirations: 18  BP: 123/88    Physical Exam        Physical Exam  Vitals and nursing note reviewed.   Constitutional:       General: He is not in acute distress.     Appearance: Normal appearance. He is not toxic-appearing.   HENT:      Head: Normocephalic.      Right Ear: Hearing and tympanic membrane normal.      Left Ear: Hearing and tympanic membrane normal.   Eyes:      General: Vision grossly intact.   Cardiovascular:      Rate and Rhythm: Normal rate.      Pulses: Normal pulses.   Pulmonary:      Effort: Pulmonary effort is normal.      Breath sounds: Normal breath sounds.   Skin:     General: Skin is warm and dry.   Neurological:      Mental Status: He is alert. Mental status is at baseline.   Psychiatric:         Mood and Affect: Mood normal.         Behavior: Behavior normal.       Assessment        HBO Diagnosis:   Problem List Items Addressed This Visit          Genitourinary    * (Principal) Irradiation cystitis with hematuria - Primary (Chronic)    Relevant Orders    NURSING COMMUNICATION 1    Hyperbaric Oxygen Treatment       Other    Late effect of radiation (Chronic)    Relevant Orders    NURSING COMMUNICATION 1

## 2025-07-08 ENCOUNTER — HOSPITAL ENCOUNTER (OUTPATIENT)
Dept: WOUND CARE | Age: 65
Discharge: HOME | End: 2025-07-08
Payer: COMMERCIAL

## 2025-07-08 VITALS
DIASTOLIC BLOOD PRESSURE: 83 MMHG | RESPIRATION RATE: 18 BRPM | HEART RATE: 69 BPM | SYSTOLIC BLOOD PRESSURE: 127 MMHG | OXYGEN SATURATION: 96 % | TEMPERATURE: 97.3 F

## 2025-07-08 VITALS
TEMPERATURE: 97.6 F | RESPIRATION RATE: 18 BRPM | HEART RATE: 66 BPM | SYSTOLIC BLOOD PRESSURE: 123 MMHG | DIASTOLIC BLOOD PRESSURE: 88 MMHG | OXYGEN SATURATION: 98 %

## 2025-07-08 DIAGNOSIS — N30.41 IRRADIATION CYSTITIS WITH HEMATURIA: Primary | ICD-10-CM

## 2025-07-08 DIAGNOSIS — H92.03 OTALGIA, BILATERAL: ICD-10-CM

## 2025-07-08 DIAGNOSIS — T66.XXXS LATE EFFECT OF RADIATION: ICD-10-CM

## 2025-07-08 PROCEDURE — 99183 HYPERBARIC OXYGEN THERAPY: CPT | Performed by: FAMILY MEDICINE

## 2025-07-08 PROCEDURE — G0277 HBOT, FULL BODY CHAMBER, 30M: HCPCS

## 2025-07-08 ASSESSMENT — PAIN SCALES - GENERAL
PAINLEVEL_OUTOF10: 0
PAINLEVEL_OUTOF10: 0

## 2025-07-08 NOTE — PROGRESS NOTES
Status: Treatment completed without issue (d/c instructions reviewed, no questions)    I was present on these premises and immediately available to furnish assistance & direction throughout the HBO Treatment.     Dagoberto Echevarria is a 65 y.o. male  did successfully complete today's hyperbaric oxygen treatment at [facility] and HBO therapy.    In my clinical judgement, ongoing HBO therapy is  necessary at this time to assist with wound healing, preservation of limb, life, or function.    Supervision and attendance of Hyperbaric Oxygen Therapy provided. Continue HBO treatment as outlined in the treatment plan.    Hyperbaric Oxygen: Mr. Echevarria tolerated: Treatment Number: 6 without  issue.    Discharge Instructions were explained and given to Mr. Echevarria     Electronically signed by Luca Rodriguez DO on 7/8/2025 at 3:09 PM

## 2025-07-09 ENCOUNTER — HOSPITAL ENCOUNTER (OUTPATIENT)
Dept: WOUND CARE | Age: 65
Discharge: HOME | End: 2025-07-09
Payer: COMMERCIAL

## 2025-07-09 VITALS
RESPIRATION RATE: 18 BRPM | SYSTOLIC BLOOD PRESSURE: 136 MMHG | TEMPERATURE: 97.3 F | DIASTOLIC BLOOD PRESSURE: 89 MMHG | OXYGEN SATURATION: 97 % | HEART RATE: 72 BPM

## 2025-07-09 DIAGNOSIS — N30.41 IRRADIATION CYSTITIS WITH HEMATURIA: Primary | ICD-10-CM

## 2025-07-09 DIAGNOSIS — H92.03 OTALGIA, BILATERAL: ICD-10-CM

## 2025-07-09 DIAGNOSIS — T66.XXXS LATE EFFECT OF RADIATION: ICD-10-CM

## 2025-07-09 PROCEDURE — G0277 HBOT, FULL BODY CHAMBER, 30M: HCPCS

## 2025-07-09 PROCEDURE — 99183 HYPERBARIC OXYGEN THERAPY: CPT | Performed by: FAMILY MEDICINE

## 2025-07-09 ASSESSMENT — PAIN SCALES - GENERAL
PAINLEVEL_OUTOF10: 0
PAINLEVEL_OUTOF10: 0

## 2025-07-10 ENCOUNTER — HOSPITAL ENCOUNTER (OUTPATIENT)
Dept: WOUND CARE | Age: 65
Discharge: HOME | End: 2025-07-10
Payer: COMMERCIAL

## 2025-07-10 VITALS
HEART RATE: 71 BPM | OXYGEN SATURATION: 99 % | RESPIRATION RATE: 18 BRPM | TEMPERATURE: 97.3 F | SYSTOLIC BLOOD PRESSURE: 127 MMHG | DIASTOLIC BLOOD PRESSURE: 89 MMHG

## 2025-07-10 DIAGNOSIS — T66.XXXS LATE EFFECT OF RADIATION: ICD-10-CM

## 2025-07-10 DIAGNOSIS — H92.03 OTALGIA, BILATERAL: ICD-10-CM

## 2025-07-10 DIAGNOSIS — N30.41 IRRADIATION CYSTITIS WITH HEMATURIA: Primary | ICD-10-CM

## 2025-07-10 PROCEDURE — 99183 HYPERBARIC OXYGEN THERAPY: CPT | Performed by: FAMILY MEDICINE

## 2025-07-10 PROCEDURE — G0277 HBOT, FULL BODY CHAMBER, 30M: HCPCS

## 2025-07-10 NOTE — PROGRESS NOTES
65 y.o. male  did successfully complete today's hyperbaric oxygen treatment at [facility] and HBO therapy.    In my clinical judgement, ongoing HBO therapy is  necessary at this time to assist with wound healing, preservation of limb, life, or function.    Supervision and attendance of Hyperbaric Oxygen Therapy provided. Continue HBO treatment as outlined in the treatment plan.    Hyperbaric Oxygen: Mr. Echevarria tolerated: Treatment Number: 7 without  issue.    Discharge Instructions were explained and given to Mr. Echevarria     Electronically signed by Luca Rodriguez DO on 7/9/2025 at 7:10 AM

## 2025-07-11 ENCOUNTER — HOSPITAL ENCOUNTER (OUTPATIENT)
Dept: WOUND CARE | Age: 65
Discharge: HOME | End: 2025-07-11
Payer: COMMERCIAL

## 2025-07-11 VITALS
DIASTOLIC BLOOD PRESSURE: 72 MMHG | OXYGEN SATURATION: 97 % | HEART RATE: 70 BPM | RESPIRATION RATE: 18 BRPM | SYSTOLIC BLOOD PRESSURE: 168 MMHG | TEMPERATURE: 97.9 F

## 2025-07-11 DIAGNOSIS — N30.41 IRRADIATION CYSTITIS WITH HEMATURIA: Primary | ICD-10-CM

## 2025-07-11 DIAGNOSIS — T66.XXXS LATE EFFECT OF RADIATION: ICD-10-CM

## 2025-07-11 DIAGNOSIS — H92.03 OTALGIA, BILATERAL: ICD-10-CM

## 2025-07-11 PROCEDURE — G0277 HBOT, FULL BODY CHAMBER, 30M: HCPCS

## 2025-07-11 PROCEDURE — 99183 HYPERBARIC OXYGEN THERAPY: CPT | Performed by: FAMILY MEDICINE

## 2025-07-11 ASSESSMENT — PAIN SCALES - GENERAL
PAINLEVEL_OUTOF10: 0
PAINLEVEL_OUTOF10: 0

## 2025-07-11 NOTE — PROGRESS NOTES
HBO PROGRESS NOTE      NAME: Dagoberto Echevarria  MEDICAL RECORD NUMBER:  [id number]  AGE: 65 y.o.   GENDER: male  : 1960  EPISODE DATE:  7/10/2025     Subjective     HBO Treatment Number: 8 out of Total Treatments: 40    HBO Diagnosis:             Indications: Radiation Cystitis w/Hematuria    Safety checks performed prior to treatment.  See doc flowsheets for documentation.    Objective        No results for input(s): \"POCGLU\" in the last 72 hours.  Pre treatment Vital Signs       Temp: 97.3 °F (36.3 °C)     Pulse: 80     Respirations: 18     BP: 130/89       Post treatment Vital Signs  Temp: 97.3 °F (36.3 °C)  Pulse: 71  Respirations: 18  BP: 127/89    Assessment        HBO Diagnosis:   Problem List Items Addressed This Visit          Genitourinary    Irradiation cystitis with hematuria - Primary (Chronic)       Other    * (Principal) Late effect of radiation (Chronic)    Otalgia, bilateral (Chronic)       Physical Exam        General Appearance:  alert and oriented to person, place and time, well-developed and well-nourished, in no acute distress    Pre Tympanic Membrane Assessment:  Right: Normal  Left: Normal    Post Tympanic Membrane Assessment:  Left: Normal  Right: Normal    Pulmonary/Chest:  clear to auscultation bilaterally- no wheezes, rales or rhonchi, normal air movement, no respiratory distress    Cardiovascular:  normal    Plan        Patient placed in a full body Monoplace Chamber #: [id number]  Treatment Start Time: 0902     Pressure Reached Time: 0911  EMILY : 2  Number of Air Breaks:        Decompression Time: 1042   Treatment End Time: 1048  Length of Treatment: 90 Minutes  Symptoms Noted During Treatment: None  Total Treatment Time (min): 106    Treatment Completion Status: Treatment completed without issue (d/c instructions reviewed, no questions)    I was present on these premises and immediately available to furnish assistance & direction throughout the HBO Treatment.     Dagoberto Echevarria is

## 2025-07-14 ENCOUNTER — HOSPITAL ENCOUNTER (OUTPATIENT)
Dept: WOUND CARE | Age: 65
Discharge: HOME | End: 2025-07-14
Payer: COMMERCIAL

## 2025-07-14 VITALS
HEART RATE: 61 BPM | DIASTOLIC BLOOD PRESSURE: 78 MMHG | TEMPERATURE: 97.3 F | RESPIRATION RATE: 18 BRPM | SYSTOLIC BLOOD PRESSURE: 110 MMHG | OXYGEN SATURATION: 97 %

## 2025-07-14 DIAGNOSIS — N30.41 IRRADIATION CYSTITIS WITH HEMATURIA: Primary | ICD-10-CM

## 2025-07-14 DIAGNOSIS — H92.03 OTALGIA, BILATERAL: ICD-10-CM

## 2025-07-14 DIAGNOSIS — T66.XXXS LATE EFFECT OF RADIATION: ICD-10-CM

## 2025-07-14 PROCEDURE — 99183 HYPERBARIC OXYGEN THERAPY: CPT

## 2025-07-14 PROCEDURE — G0277 HBOT, FULL BODY CHAMBER, 30M: HCPCS

## 2025-07-14 ASSESSMENT — PAIN SCALES - GENERAL
PAINLEVEL_OUTOF10: 0
PAINLEVEL_OUTOF10: 0

## 2025-07-14 ASSESSMENT — VISUAL ACUITY: OU: 1

## 2025-07-14 NOTE — PROGRESS NOTES
[facility]  Hyperbaric Oxygen Therapy Progress Note    NAME: Dagoberto Echevarria  MEDICAL RECORD NUMBER: [id number]  AGE: 65 y.o.     GENDER: male    : 1960  EPISODE DATE: 2025   Subjective     HBO Treatment Number: 10 out of Total Treatments: 40.    HBO Diagnosis:   Present on Admission:   Irradiation cystitis with hematuria    Indications: Radiation Cystitis w/Hematuria    Safety checks performed prior to treatment. See documentation flowsheet for details.  Objective        No results for input(s): \"POCGLU\" in the last 72 hours.  Pre treatment Vital Signs       Temp: 97.3 °F (36.3 °C)     Pulse: 78     Respirations: 17     BP: 123/82       Post treatment Vital Signs  Temp: 97.3 °F (36.3 °C)  Pulse: 61  Respirations: 18  BP: 110/78    Physical Exam        Physical Exam  Vitals and nursing note reviewed.   Constitutional:       General: He is not in acute distress.     Appearance: Normal appearance. He is not toxic-appearing.   HENT:      Head: Normocephalic.      Right Ear: Hearing and tympanic membrane normal.      Left Ear: Hearing and tympanic membrane normal.   Eyes:      General: Vision grossly intact.   Cardiovascular:      Rate and Rhythm: Normal rate.      Pulses: Normal pulses.   Pulmonary:      Effort: Pulmonary effort is normal.      Breath sounds: Normal breath sounds.   Skin:     General: Skin is warm and dry.   Neurological:      Mental Status: He is alert. Mental status is at baseline.   Psychiatric:         Mood and Affect: Mood normal.         Behavior: Behavior normal.       Assessment        HBO Diagnosis:   Problem List Items Addressed This Visit          Genitourinary    Irradiation cystitis with hematuria - Primary (Chronic)    Relevant Orders    NURSING COMMUNICATION 1    Hyperbaric Oxygen Treatment       Other    Late effect of radiation (Chronic)    Relevant Orders    NURSING COMMUNICATION 1    Hyperbaric Oxygen Treatment

## 2025-07-15 ENCOUNTER — HOSPITAL ENCOUNTER (OUTPATIENT)
Dept: WOUND CARE | Age: 65
Discharge: HOME | End: 2025-07-15
Payer: COMMERCIAL

## 2025-07-15 VITALS
DIASTOLIC BLOOD PRESSURE: 90 MMHG | OXYGEN SATURATION: 93 % | SYSTOLIC BLOOD PRESSURE: 120 MMHG | RESPIRATION RATE: 18 BRPM | TEMPERATURE: 97.6 F | HEART RATE: 58 BPM

## 2025-07-15 DIAGNOSIS — T66.XXXS LATE EFFECT OF RADIATION: ICD-10-CM

## 2025-07-15 DIAGNOSIS — H92.03 OTALGIA, BILATERAL: ICD-10-CM

## 2025-07-15 DIAGNOSIS — N30.41 IRRADIATION CYSTITIS WITH HEMATURIA: Primary | ICD-10-CM

## 2025-07-15 PROCEDURE — 99183 HYPERBARIC OXYGEN THERAPY: CPT | Performed by: FAMILY MEDICINE

## 2025-07-15 PROCEDURE — G0277 HBOT, FULL BODY CHAMBER, 30M: HCPCS

## 2025-07-15 ASSESSMENT — PAIN SCALES - GENERAL
PAINLEVEL_OUTOF10: 0
PAINLEVEL_OUTOF10: 0

## 2025-07-16 ENCOUNTER — HOSPITAL ENCOUNTER (OUTPATIENT)
Dept: WOUND CARE | Age: 65
Discharge: HOME | End: 2025-07-16
Payer: COMMERCIAL

## 2025-07-16 VITALS
HEART RATE: 65 BPM | SYSTOLIC BLOOD PRESSURE: 121 MMHG | TEMPERATURE: 97.5 F | DIASTOLIC BLOOD PRESSURE: 78 MMHG | RESPIRATION RATE: 17 BRPM | OXYGEN SATURATION: 99 %

## 2025-07-16 DIAGNOSIS — H92.03 OTALGIA, BILATERAL: ICD-10-CM

## 2025-07-16 DIAGNOSIS — T66.XXXS LATE EFFECT OF RADIATION: ICD-10-CM

## 2025-07-16 DIAGNOSIS — N30.41 IRRADIATION CYSTITIS WITH HEMATURIA: Primary | ICD-10-CM

## 2025-07-16 PROCEDURE — G0277 HBOT, FULL BODY CHAMBER, 30M: HCPCS

## 2025-07-16 PROCEDURE — 99183 HYPERBARIC OXYGEN THERAPY: CPT | Performed by: FAMILY MEDICINE

## 2025-07-16 ASSESSMENT — PAIN SCALES - GENERAL: PAINLEVEL_OUTOF10: 0

## 2025-07-17 ENCOUNTER — HOSPITAL ENCOUNTER (OUTPATIENT)
Dept: WOUND CARE | Age: 65
Discharge: HOME | End: 2025-07-17
Payer: COMMERCIAL

## 2025-07-17 VITALS
RESPIRATION RATE: 18 BRPM | DIASTOLIC BLOOD PRESSURE: 78 MMHG | SYSTOLIC BLOOD PRESSURE: 121 MMHG | HEART RATE: 58 BPM | TEMPERATURE: 97.6 F | OXYGEN SATURATION: 99 %

## 2025-07-17 DIAGNOSIS — N30.41 IRRADIATION CYSTITIS WITH HEMATURIA: Primary | ICD-10-CM

## 2025-07-17 DIAGNOSIS — H92.03 OTALGIA, BILATERAL: ICD-10-CM

## 2025-07-17 DIAGNOSIS — T66.XXXS LATE EFFECT OF RADIATION: ICD-10-CM

## 2025-07-17 PROCEDURE — 99183 HYPERBARIC OXYGEN THERAPY: CPT | Performed by: FAMILY MEDICINE

## 2025-07-17 PROCEDURE — G0277 HBOT, FULL BODY CHAMBER, 30M: HCPCS

## 2025-07-17 ASSESSMENT — PAIN SCALES - GENERAL
PAINLEVEL_OUTOF10: 0
PAINLEVEL_OUTOF10: 0

## 2025-07-17 NOTE — PROGRESS NOTES
HBO PROGRESS NOTE      NAME: Dagoberto Echevarria  MEDICAL RECORD NUMBER:  [id number]  AGE: 65 y.o.   GENDER: male  : 1960  EPISODE DATE:  2025     Subjective     HBO Treatment Number: 12 out of Total Treatments: 40    HBO Diagnosis:             Indications: Radiation Cystitis w/Hematuria    Safety checks performed prior to treatment.  See doc flowsheets for documentation.    Objective        No results for input(s): \"POCGLU\" in the last 72 hours.  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)       Other    Late effect of radiation (Chronic)    Otalgia, bilateral       Physical Exam        General Appearance:  alert and oriented to person, place and time, well-developed and well-nourished, in no acute distress    Pre Tympanic Membrane Assessment:  Right: Normal  Left: Normal    Post Tympanic Membrane Assessment:  Left: Normal  Right: Normal    Pulmonary/Chest:  clear to auscultation bilaterally- no wheezes, rales or rhonchi, normal air movement, no respiratory distress    Cardiovascular:  normal    Plan        Patient placed in a full body Monoplace Chamber #: [id number]  Treatment Start Time: 0859     Pressure Reached Time: 0908  EMILY : 2  Number of Air Breaks:        Decompression Time: 1039   Treatment End Time: 1045  Length of Treatment: 90 Minutes  Symptoms Noted During Treatment: None  Total Treatment Time (min): 106    Treatment Completion Status: Treatment completed without issue (d/c instructions reviewed, no questions)    I was present on these premises and immediately available to furnish assistance & direction throughout the HBO Treatment.     Dagoberto Echevarria is a 65 y.o. male  did

## 2025-07-17 NOTE — PROGRESS NOTES
a 65 y.o. male  did successfully complete today's hyperbaric oxygen treatment at [facility] and HBO therapy.    In my clinical judgement, ongoing HBO therapy is  necessary at this time to assist with wound healing, preservation of limb, life, or function.    Supervision and attendance of Hyperbaric Oxygen Therapy provided. Continue HBO treatment as outlined in the treatment plan.    Hyperbaric Oxygen: Mr. Echevarria tolerated: Treatment Number: 11 without  issue.    Discharge Instructions were explained and given to Mr. Echevarria     Electronically signed by Luca Rodriguez DO on 7/15/2025 at 7:19 AM

## 2025-07-18 ENCOUNTER — HOSPITAL ENCOUNTER (OUTPATIENT)
Dept: WOUND CARE | Age: 65
Discharge: HOME | End: 2025-07-18
Payer: COMMERCIAL

## 2025-07-18 VITALS
TEMPERATURE: 97.6 F | HEART RATE: 67 BPM | OXYGEN SATURATION: 96 % | SYSTOLIC BLOOD PRESSURE: 120 MMHG | DIASTOLIC BLOOD PRESSURE: 81 MMHG | RESPIRATION RATE: 18 BRPM

## 2025-07-18 DIAGNOSIS — T66.XXXS LATE EFFECT OF RADIATION: ICD-10-CM

## 2025-07-18 DIAGNOSIS — H92.03 OTALGIA, BILATERAL: ICD-10-CM

## 2025-07-18 DIAGNOSIS — N30.41 IRRADIATION CYSTITIS WITH HEMATURIA: Primary | ICD-10-CM

## 2025-07-18 PROCEDURE — G0277 HBOT, FULL BODY CHAMBER, 30M: HCPCS

## 2025-07-18 ASSESSMENT — PAIN SCALES - GENERAL
PAINLEVEL_OUTOF10: 0
PAINLEVEL_OUTOF10: 0

## 2025-07-18 NOTE — PROGRESS NOTES
HBO PROGRESS NOTE      NAME: Dagoberto Echevarria  MEDICAL RECORD NUMBER:  [id number]  AGE: 65 y.o.   GENDER: male  : 1960  EPISODE DATE:  2025     Subjective     HBO Treatment Number: 13 out of Total Treatments: 40    HBO Diagnosis:             Indications: Radiation Cystitis w/Hematuria    Safety checks performed prior to treatment.  See doc flowsheets for documentation.    Objective        No results for input(s): \"POCGLU\" in the last 72 hours.  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)       Other    Late effect of radiation (Chronic)    Otalgia, bilateral       Physical Exam        General Appearance:  alert and oriented to person, place and time, well-developed and well-nourished, in no acute distress    Pre Tympanic Membrane Assessment:  Right: Normal  Left: Normal    Post Tympanic Membrane Assessment:  Left: Normal  Right: Normal    Pulmonary/Chest:  clear to auscultation bilaterally- no wheezes, rales or rhonchi, normal air movement, no respiratory distress    Cardiovascular:  normal    Plan        Patient placed in a full body Monoplace Chamber #: [id number]  Treatment Start Time: 0857     Pressure Reached Time: 0906  EMILY : 2  Number of Air Breaks:        Decompression Time: 1037   Treatment End Time: 1043  Length of Treatment: 90 Minutes  Symptoms Noted During Treatment: None  Total Treatment Time (min): 106    Treatment Completion Status: Treatment completed without issue (d/c instructions reviewed, no questions)    I was present on these premises and immediately available to furnish assistance & direction throughout the HBO Treatment.     Dagoberto Ehcevarria is a 65 y.o. male  did

## 2025-07-21 ENCOUNTER — HOSPITAL ENCOUNTER (OUTPATIENT)
Dept: WOUND CARE | Age: 65
Discharge: HOME | End: 2025-07-21
Payer: COMMERCIAL

## 2025-07-21 VITALS
TEMPERATURE: 97.7 F | RESPIRATION RATE: 18 BRPM | DIASTOLIC BLOOD PRESSURE: 86 MMHG | SYSTOLIC BLOOD PRESSURE: 128 MMHG | OXYGEN SATURATION: 99 % | HEART RATE: 66 BPM

## 2025-07-21 DIAGNOSIS — N30.41 IRRADIATION CYSTITIS WITH HEMATURIA: Primary | ICD-10-CM

## 2025-07-21 DIAGNOSIS — T66.XXXS LATE EFFECT OF RADIATION: ICD-10-CM

## 2025-07-21 DIAGNOSIS — H92.03 OTALGIA, BILATERAL: ICD-10-CM

## 2025-07-21 PROCEDURE — 99183 HYPERBARIC OXYGEN THERAPY: CPT | Performed by: FAMILY MEDICINE

## 2025-07-21 PROCEDURE — G0277 HBOT, FULL BODY CHAMBER, 30M: HCPCS

## 2025-07-21 ASSESSMENT — PAIN SCALES - GENERAL
PAINLEVEL_OUTOF10: 0
PAINLEVEL_OUTOF10: 0

## 2025-07-22 ENCOUNTER — HOSPITAL ENCOUNTER (OUTPATIENT)
Dept: WOUND CARE | Age: 65
Discharge: HOME | End: 2025-07-22
Payer: COMMERCIAL

## 2025-07-22 VITALS
OXYGEN SATURATION: 97 % | HEART RATE: 65 BPM | DIASTOLIC BLOOD PRESSURE: 80 MMHG | TEMPERATURE: 97.6 F | SYSTOLIC BLOOD PRESSURE: 130 MMHG | RESPIRATION RATE: 18 BRPM

## 2025-07-22 DIAGNOSIS — H92.03 OTALGIA, BILATERAL: ICD-10-CM

## 2025-07-22 DIAGNOSIS — T66.XXXS LATE EFFECT OF RADIATION: ICD-10-CM

## 2025-07-22 DIAGNOSIS — N30.41 IRRADIATION CYSTITIS WITH HEMATURIA: Primary | ICD-10-CM

## 2025-07-22 PROCEDURE — G0277 HBOT, FULL BODY CHAMBER, 30M: HCPCS

## 2025-07-22 PROCEDURE — 99183 HYPERBARIC OXYGEN THERAPY: CPT | Performed by: FAMILY MEDICINE

## 2025-07-22 ASSESSMENT — PAIN SCALES - GENERAL
PAINLEVEL_OUTOF10: 0
PAINLEVEL_OUTOF10: 0

## 2025-07-22 NOTE — PROGRESS NOTES
HBO PROGRESS NOTE      NAME: Dagoberto Echevarria  MEDICAL RECORD NUMBER:  [id number]  AGE: 65 y.o.   GENDER: male  : 1960  EPISODE DATE:  2025     Subjective     HBO Treatment Number: 15 out of Total Treatments: 40    HBO Diagnosis:             Indications: Radiation Cystitis w/Hematuria    Safety checks performed prior to treatment.  See doc flowsheets for documentation.    Objective        No results for input(s): \"POCGLU\" in the last 72 hours.  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)       Other    Late effect of radiation (Chronic)    Otalgia, bilateral       Physical Exam        General Appearance:  alert and oriented to person, place and time, well-developed and well-nourished, in no acute distress    Pre Tympanic Membrane Assessment:  Right: Normal  Left: Normal    Post Tympanic Membrane Assessment:  Left: Normal  Right: Normal    Pulmonary/Chest:  clear to auscultation bilaterally- no wheezes, rales or rhonchi, normal air movement, no respiratory distress    Cardiovascular:  normal    Plan        Patient placed in a full body Monoplace Chamber #: [id number]  Treatment Start Time: 0857     Pressure Reached Time: 0906  EMILY : 2  Number of Air Breaks:        Decompression Time: 1037   Treatment End Time: 1043  Length of Treatment: 90 Minutes  Symptoms Noted During Treatment: None  Total Treatment Time (min): 106    Treatment Completion Status: Treatment completed without issue (d/c instructions reviewed, no questions)    I was present on these premises and immediately available to furnish assistance & direction throughout the HBO Treatment.     Dagoberto Echevarria is a 65 y.o. male

## 2025-07-23 ENCOUNTER — HOSPITAL ENCOUNTER (OUTPATIENT)
Dept: WOUND CARE | Age: 65
Discharge: HOME | End: 2025-07-23
Payer: COMMERCIAL

## 2025-07-23 VITALS
DIASTOLIC BLOOD PRESSURE: 80 MMHG | SYSTOLIC BLOOD PRESSURE: 126 MMHG | HEART RATE: 67 BPM | RESPIRATION RATE: 18 BRPM | OXYGEN SATURATION: 100 % | TEMPERATURE: 97.8 F

## 2025-07-23 DIAGNOSIS — T66.XXXS LATE EFFECT OF RADIATION: ICD-10-CM

## 2025-07-23 DIAGNOSIS — N30.41 IRRADIATION CYSTITIS WITH HEMATURIA: Primary | ICD-10-CM

## 2025-07-23 DIAGNOSIS — H92.03 OTALGIA, BILATERAL: ICD-10-CM

## 2025-07-23 PROCEDURE — 99183 HYPERBARIC OXYGEN THERAPY: CPT | Performed by: FAMILY MEDICINE

## 2025-07-23 PROCEDURE — G0277 HBOT, FULL BODY CHAMBER, 30M: HCPCS

## 2025-07-23 ASSESSMENT — PAIN SCALES - GENERAL
PAINLEVEL_OUTOF10: 0
PAINLEVEL_OUTOF10: 0

## 2025-07-23 NOTE — PROGRESS NOTES
HBO PROGRESS NOTE      NAME: Dagoberto Echevarria  MEDICAL RECORD NUMBER:  [id number]  AGE: 65 y.o.   GENDER: male  : 1960  EPISODE DATE:  2025     Subjective     HBO Treatment Number: 17 out of Total Treatments: 40    HBO Diagnosis:             Indications: Radiation Cystitis w/Hematuria    Safety checks performed prior to treatment.  See doc flowsheets for documentation.    Objective        No results for input(s): \"POCGLU\" in the last 72 hours.  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)    Relevant Orders    NURSING COMMUNICATION 1    Hyperbaric Oxygen Treatment       Other    Late effect of radiation (Chronic)    Relevant Orders    NURSING COMMUNICATION 1    Hyperbaric Oxygen Treatment    Otalgia, bilateral    Relevant Orders    NURSING COMMUNICATION 1    Hyperbaric Oxygen Treatment       Physical Exam        General Appearance:  alert and oriented to person, place and time, well-developed and well-nourished, in no acute distress    Pre Tympanic Membrane Assessment:  Right: Normal  Left: Normal    Post Tympanic Membrane Assessment:  Left: Normal  Right: Normal    Pulmonary/Chest:  clear to auscultation bilaterally- no wheezes, rales or rhonchi, normal air movement, no respiratory distress    Cardiovascular:  normal    Plan        Patient placed in a full body Monoplace Chamber #: [id number]  Treatment Start Time: 0857     Pressure Reached Time: 0906  EMILY : 2  Number of Air Breaks:        Decompression Time: 1037   Treatment End Time: 1043  Length of Treatment: 90 Minutes  Symptoms Noted During Treatment: None  Total Treatment Time (min): 106    Treatment Completion Status:

## 2025-07-23 NOTE — PROGRESS NOTES
Hyperbaric Oxygen Therapy   Utilization Review    Dagoberto Echevarria  MEDICAL RECORD NUMBER:  [id number]  AGE: 65 y.o.   GENDER: male  : 1960  EPISODE DATE:  2025      HBO Diagnosis:             Indications: Radiation Cystitis w/Hematuria    Dagoberto Echevarria is a 65 y.o. male  Who is currently receiving hyperbaric oxygen therapy at [facility] and HBOT.    Mr. Echevarria has currently received: Treatment Number: 17 out of Total Treatments: 40    In my clinical judgement, ongoing HBO therapy is necessary at this time.  Mr. Echevarria has shown improvement with hyperbaric oxygen therapy as evidenced by significantly less episodes of hematuria and not as intense when they do happen.  He has hardly had any bleeding since starting HBO great sign that we are helping and radiation cystitis is improved.            The patient/caregiver verbalized understanding of the Utilization Review for HBOT and has agreed to the plan.     Electronically signed by Luca Rodriguez DO on 2025 at 2:59 PM

## 2025-07-23 NOTE — PROGRESS NOTES
HBO PROGRESS NOTE      NAME: Dagoberto Echevarria  MEDICAL RECORD NUMBER:  [id number]  AGE: 65 y.o.   GENDER: male  : 1960  EPISODE DATE:  2025     Subjective     HBO Treatment Number: 16 out of Total Treatments: 40    HBO Diagnosis:             Indications: Radiation Cystitis w/Hematuria    Safety checks performed prior to treatment.  See doc flowsheets for documentation.    Objective        No results for input(s): \"POCGLU\" in the last 72 hours.  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)       Other    Late effect of radiation (Chronic)    Otalgia, bilateral       Physical Exam        General Appearance:  alert and oriented to person, place and time, well-developed and well-nourished, in no acute distress    Pre Tympanic Membrane Assessment:  Right: Normal  Left: Normal    Post Tympanic Membrane Assessment:  Left: Normal  Right: Normal    Pulmonary/Chest:  clear to auscultation bilaterally- no wheezes, rales or rhonchi, normal air movement, no respiratory distress    Cardiovascular:  normal    Plan        Patient placed in a full body Monoplace Chamber #: [id number]  Treatment Start Time: 1104     Pressure Reached Time: 1113  EMILY : 2  Number of Air Breaks:        Decompression Time: 1244   Treatment End Time: 1250  Length of Treatment: 90 Minutes  Symptoms Noted During Treatment: None  Total Treatment Time (min): 106    Treatment Completion Status: Treatment completed without issue (D/C instructions reviewed no questions)    I was present on these premises and immediately available to furnish assistance & direction throughout the HBO Treatment.     Dagoberto Echevarria is a 65 y.o.

## 2025-07-24 ENCOUNTER — HOSPITAL ENCOUNTER (OUTPATIENT)
Dept: WOUND CARE | Age: 65
Discharge: HOME | End: 2025-07-24
Payer: COMMERCIAL

## 2025-07-24 VITALS
DIASTOLIC BLOOD PRESSURE: 89 MMHG | TEMPERATURE: 97.9 F | SYSTOLIC BLOOD PRESSURE: 123 MMHG | OXYGEN SATURATION: 97 % | HEART RATE: 60 BPM | RESPIRATION RATE: 17 BRPM

## 2025-07-24 DIAGNOSIS — T66.XXXS LATE EFFECT OF RADIATION: ICD-10-CM

## 2025-07-24 DIAGNOSIS — H92.03 OTALGIA, BILATERAL: ICD-10-CM

## 2025-07-24 DIAGNOSIS — N30.41 IRRADIATION CYSTITIS WITH HEMATURIA: Primary | ICD-10-CM

## 2025-07-24 PROCEDURE — 99183 HYPERBARIC OXYGEN THERAPY: CPT | Performed by: FAMILY MEDICINE

## 2025-07-24 PROCEDURE — G0277 HBOT, FULL BODY CHAMBER, 30M: HCPCS

## 2025-07-24 ASSESSMENT — PAIN SCALES - GENERAL
PAINLEVEL_OUTOF10: 0
PAINLEVEL_OUTOF10: 0

## 2025-07-25 ENCOUNTER — HOSPITAL ENCOUNTER (OUTPATIENT)
Dept: WOUND CARE | Age: 65
Discharge: HOME | End: 2025-07-25
Payer: COMMERCIAL

## 2025-07-25 VITALS
OXYGEN SATURATION: 100 % | TEMPERATURE: 97.9 F | DIASTOLIC BLOOD PRESSURE: 85 MMHG | RESPIRATION RATE: 18 BRPM | HEART RATE: 62 BPM | SYSTOLIC BLOOD PRESSURE: 117 MMHG

## 2025-07-25 DIAGNOSIS — T66.XXXS LATE EFFECT OF RADIATION: ICD-10-CM

## 2025-07-25 DIAGNOSIS — H92.03 OTALGIA, BILATERAL: ICD-10-CM

## 2025-07-25 DIAGNOSIS — N30.41 IRRADIATION CYSTITIS WITH HEMATURIA: Primary | ICD-10-CM

## 2025-07-25 PROCEDURE — 99183 HYPERBARIC OXYGEN THERAPY: CPT | Performed by: FAMILY MEDICINE

## 2025-07-25 PROCEDURE — G0277 HBOT, FULL BODY CHAMBER, 30M: HCPCS

## 2025-07-25 ASSESSMENT — PAIN SCALES - GENERAL
PAINLEVEL_OUTOF10: 0
PAINLEVEL_OUTOF10: 0

## 2025-07-25 NOTE — PROGRESS NOTES
HBO PROGRESS NOTE      NAME: Dagoberto Echevarria  MEDICAL RECORD NUMBER:  [id number]  AGE: 65 y.o.   GENDER: male  : 1960  EPISODE DATE:  2025     Subjective     HBO Treatment Number: 18 out of Total Treatments: 40    HBO Diagnosis:             Indications: Radiation Cystitis w/Hematuria    Safety checks performed prior to treatment.  See doc flowsheets for documentation.    Objective        No results for input(s): \"POCGLU\" in the last 72 hours.  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)       Other    Late effect of radiation (Chronic)    Otalgia, bilateral       Physical Exam        General Appearance:  alert and oriented to person, place and time, well-developed and well-nourished, in no acute distress    Pre Tympanic Membrane Assessment:  Right: Normal  Left: Normal    Post Tympanic Membrane Assessment:  Left: Normal  Right: Normal    Pulmonary/Chest:  clear to auscultation bilaterally- no wheezes, rales or rhonchi, normal air movement, no respiratory distress    Cardiovascular:  normal    Plan        Patient placed in a full body Monoplace Chamber #: [id number]  Treatment Start Time: 0859     Pressure Reached Time: 0908  EMILY : 2  Number of Air Breaks:        Decompression Time: 1039   Treatment End Time: 1045  Length of Treatment: 90 Minutes  Symptoms Noted During Treatment: None  Total Treatment Time (min): 106    Treatment Completion Status: Treatment completed without issue (d/c instructions reviewed, no questions)    I was present on these premises and immediately available to furnish assistance & direction throughout the HBO Treatment.     Dagoberto Echevarria is a 65 y.o. male  did

## 2025-07-25 NOTE — PROGRESS NOTES
HBO PROGRESS NOTE      NAME: Dagoberto Echevarria  MEDICAL RECORD NUMBER:  [id number]  AGE: 65 y.o.   GENDER: male  : 1960  EPISODE DATE:  2025     Subjective     HBO Treatment Number: 19 out of Total Treatments: 40    HBO Diagnosis:             Indications: Radiation Cystitis w/Hematuria    Safety checks performed prior to treatment.  See doc flowsheets for documentation.    Objective        No results for input(s): \"POCGLU\" in the last 72 hours.  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)    Relevant Orders    NURSING COMMUNICATION 1    Hyperbaric Oxygen Treatment       Other    Late effect of radiation (Chronic)    Relevant Orders    NURSING COMMUNICATION 1    Hyperbaric Oxygen Treatment    Otalgia, bilateral    Relevant Orders    NURSING COMMUNICATION 1    Hyperbaric Oxygen Treatment       Physical Exam        General Appearance:  alert and oriented to person, place and time, well-developed and well-nourished, in no acute distress    Pre Tympanic Membrane Assessment:  Right: Normal  Left: Normal    Post Tympanic Membrane Assessment:  Left: Normal  Right: Normal    Pulmonary/Chest:  clear to auscultation bilaterally- no wheezes, rales or rhonchi, normal air movement, no respiratory distress    Cardiovascular:  normal    Plan        Patient placed in a full body Monoplace Chamber #: [id number]  Treatment Start Time: 0847     Pressure Reached Time: 0856  EMILY : 2  Number of Air Breaks:        Decompression Time: 1027   Treatment End Time: 1034  Length of Treatment: 90 Minutes  Symptoms Noted During Treatment: None  Total Treatment Time (min): 107    Treatment Completion Status: Treatment

## 2025-07-28 ENCOUNTER — HOSPITAL ENCOUNTER (OUTPATIENT)
Dept: WOUND CARE | Age: 65
Discharge: HOME | End: 2025-07-28
Payer: COMMERCIAL

## 2025-07-28 VITALS
OXYGEN SATURATION: 99 % | SYSTOLIC BLOOD PRESSURE: 129 MMHG | TEMPERATURE: 97.6 F | DIASTOLIC BLOOD PRESSURE: 81 MMHG | RESPIRATION RATE: 18 BRPM | HEART RATE: 67 BPM

## 2025-07-28 DIAGNOSIS — H92.03 OTALGIA, BILATERAL: ICD-10-CM

## 2025-07-28 DIAGNOSIS — N30.41 IRRADIATION CYSTITIS WITH HEMATURIA: Primary | ICD-10-CM

## 2025-07-28 DIAGNOSIS — T66.XXXS LATE EFFECT OF RADIATION: ICD-10-CM

## 2025-07-28 PROCEDURE — G0277 HBOT, FULL BODY CHAMBER, 30M: HCPCS

## 2025-07-28 PROCEDURE — 99183 HYPERBARIC OXYGEN THERAPY: CPT | Performed by: FAMILY MEDICINE

## 2025-07-28 ASSESSMENT — PAIN SCALES - GENERAL
PAINLEVEL_OUTOF10: 0
PAINLEVEL_OUTOF10: 0

## 2025-07-29 ENCOUNTER — HOSPITAL ENCOUNTER (OUTPATIENT)
Dept: WOUND CARE | Age: 65
Discharge: HOME | End: 2025-07-29
Payer: COMMERCIAL

## 2025-07-29 VITALS
DIASTOLIC BLOOD PRESSURE: 77 MMHG | TEMPERATURE: 97.3 F | SYSTOLIC BLOOD PRESSURE: 118 MMHG | HEART RATE: 59 BPM | OXYGEN SATURATION: 100 % | RESPIRATION RATE: 18 BRPM

## 2025-07-29 DIAGNOSIS — H92.03 OTALGIA, BILATERAL: ICD-10-CM

## 2025-07-29 DIAGNOSIS — T66.XXXS LATE EFFECT OF RADIATION: ICD-10-CM

## 2025-07-29 DIAGNOSIS — N30.41 IRRADIATION CYSTITIS WITH HEMATURIA: Primary | ICD-10-CM

## 2025-07-29 PROCEDURE — 99183 HYPERBARIC OXYGEN THERAPY: CPT | Performed by: FAMILY MEDICINE

## 2025-07-29 PROCEDURE — G0277 HBOT, FULL BODY CHAMBER, 30M: HCPCS

## 2025-07-29 ASSESSMENT — PAIN SCALES - GENERAL
PAINLEVEL_OUTOF10: 0
PAINLEVEL_OUTOF10: 0

## 2025-07-30 ENCOUNTER — HOSPITAL ENCOUNTER (OUTPATIENT)
Dept: WOUND CARE | Age: 65
Discharge: HOME | End: 2025-07-30
Payer: COMMERCIAL

## 2025-07-30 VITALS
DIASTOLIC BLOOD PRESSURE: 85 MMHG | OXYGEN SATURATION: 97 % | HEART RATE: 64 BPM | RESPIRATION RATE: 18 BRPM | TEMPERATURE: 97.8 F | SYSTOLIC BLOOD PRESSURE: 121 MMHG

## 2025-07-30 DIAGNOSIS — N30.41 IRRADIATION CYSTITIS WITH HEMATURIA: Primary | ICD-10-CM

## 2025-07-30 DIAGNOSIS — H92.03 OTALGIA, BILATERAL: ICD-10-CM

## 2025-07-30 DIAGNOSIS — T66.XXXS LATE EFFECT OF RADIATION: ICD-10-CM

## 2025-07-30 PROCEDURE — G0277 HBOT, FULL BODY CHAMBER, 30M: HCPCS

## 2025-07-30 PROCEDURE — 99183 HYPERBARIC OXYGEN THERAPY: CPT | Performed by: FAMILY MEDICINE

## 2025-07-30 ASSESSMENT — PAIN SCALES - GENERAL
PAINLEVEL_OUTOF10: 0
PAINLEVEL_OUTOF10: 0

## 2025-07-30 NOTE — PROGRESS NOTES
HBO PROGRESS NOTE      NAME: Dagoberto Echevarria  MEDICAL RECORD NUMBER:  [id number]  AGE: 65 y.o.   GENDER: male  : 1960  EPISODE DATE:  2025     Subjective     HBO Treatment Number: 21 out of Total Treatments: 40    HBO Diagnosis:             Indications: Radiation Cystitis w/Hematuria    Safety checks performed prior to treatment.  See doc flowsheets for documentation.    Objective        No results for input(s): \"POCGLU\" in the last 72 hours.  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)       Other    Late effect of radiation (Chronic)    Otalgia, bilateral       Physical Exam        General Appearance:  alert and oriented to person, place and time, well-developed and well-nourished, in no acute distress    Pre Tympanic Membrane Assessment:  Right: Normal  Left: Normal    Post Tympanic Membrane Assessment:  Left: Normal  Right: Normal    Pulmonary/Chest:  clear to auscultation bilaterally- no wheezes, rales or rhonchi, normal air movement, no respiratory distress    Cardiovascular:  normal    Plan        Patient placed in a full body Monoplace Chamber #: [id number]  Treatment Start Time: 0852     Pressure Reached Time: 0901  EMILY : 2  Number of Air Breaks:        Decompression Time: 1032   Treatment End Time: 1038  Length of Treatment: 90 Minutes  Symptoms Noted During Treatment: None  Total Treatment Time (min): 106    Treatment Completion Status: Treatment completed without issue (d/c instructions reviewed, no questions)    I was present on these premises and immediately available to furnish assistance & direction throughout the HBO Treatment.     Dagoberto Echevarria is a 65

## 2025-07-31 ENCOUNTER — HOSPITAL ENCOUNTER (OUTPATIENT)
Dept: WOUND CARE | Age: 65
Discharge: HOME | End: 2025-07-31
Payer: COMMERCIAL

## 2025-07-31 VITALS
SYSTOLIC BLOOD PRESSURE: 115 MMHG | TEMPERATURE: 97.7 F | RESPIRATION RATE: 18 BRPM | DIASTOLIC BLOOD PRESSURE: 85 MMHG | HEART RATE: 65 BPM | OXYGEN SATURATION: 96 %

## 2025-07-31 DIAGNOSIS — T66.XXXS LATE EFFECT OF RADIATION: ICD-10-CM

## 2025-07-31 DIAGNOSIS — N30.41 IRRADIATION CYSTITIS WITH HEMATURIA: Primary | ICD-10-CM

## 2025-07-31 DIAGNOSIS — H92.03 OTALGIA, BILATERAL: ICD-10-CM

## 2025-07-31 PROCEDURE — G0277 HBOT, FULL BODY CHAMBER, 30M: HCPCS

## 2025-07-31 PROCEDURE — 99183 HYPERBARIC OXYGEN THERAPY: CPT | Performed by: FAMILY MEDICINE

## 2025-07-31 ASSESSMENT — PAIN SCALES - GENERAL
PAINLEVEL_OUTOF10: 0
PAINLEVEL_OUTOF10: 0

## 2025-07-31 NOTE — PROGRESS NOTES
HBO PROGRESS NOTE      NAME: Dagoberto Echevarria  MEDICAL RECORD NUMBER:  [id number]  AGE: 65 y.o.   GENDER: male  : 1960  EPISODE DATE:  2025     Subjective     HBO Treatment Number: 22 out of Total Treatments: 40    HBO Diagnosis:             Indications: Radiation Cystitis w/Hematuria    Safety checks performed prior to treatment.  See doc flowsheets for documentation.    Objective        No results for input(s): \"POCGLU\" in the last 72 hours.  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)       Other    * (Principal) Late effect of radiation (Chronic)    Otalgia, bilateral       Physical Exam        General Appearance:  alert and oriented to person, place and time, well-developed and well-nourished, in no acute distress    Pre Tympanic Membrane Assessment:  Right: Normal  Left: Normal    Post Tympanic Membrane Assessment:  Left: Normal  Right: Normal    Pulmonary/Chest:  clear to auscultation bilaterally- no wheezes, rales or rhonchi, normal air movement, no respiratory distress    Cardiovascular:  normal    Plan        Patient placed in a full body Monoplace Chamber #: [id number]  Treatment Start Time: 0846     Pressure Reached Time: 0855  EMILY : 2  Number of Air Breaks:        Decompression Time: 1026   Treatment End Time: 1032  Length of Treatment: 90 Minutes  Symptoms Noted During Treatment: None  Total Treatment Time (min): 106    Treatment Completion Status: Treatment completed without issue (d/c instructions reviewed, no questions)    I was present on these premises and immediately available to furnish assistance & direction throughout the HBO Treatment.     Dagoberto Echevarria is a 65

## 2025-07-31 NOTE — PROGRESS NOTES
HBO PROGRESS NOTE      NAME: Dagoberto Echevarria  MEDICAL RECORD NUMBER:  [id number]  AGE: 65 y.o.   GENDER: male  : 1960  EPISODE DATE:  2025     Subjective     HBO Treatment Number: 23 out of Total Treatments: 40    HBO Diagnosis:             Indications: Radiation Cystitis w/Hematuria    Safety checks performed prior to treatment.  See doc flowsheets for documentation.    Objective        No results for input(s): \"POCGLU\" in the last 72 hours.  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)    Relevant Orders    NURSING COMMUNICATION 1    Hyperbaric Oxygen Treatment       Other    Late effect of radiation (Chronic)    Relevant Orders    NURSING COMMUNICATION 1    Hyperbaric Oxygen Treatment    Otalgia, bilateral    Relevant Orders    NURSING COMMUNICATION 1    Hyperbaric Oxygen Treatment       Physical Exam        General Appearance:  alert and oriented to person, place and time, well-developed and well-nourished, in no acute distress    Pre Tympanic Membrane Assessment:  Right: Normal  Left: Normal    Post Tympanic Membrane Assessment:  Left: Normal  Right: Normal    Pulmonary/Chest:  clear to auscultation bilaterally- no wheezes, rales or rhonchi, normal air movement, no respiratory distress    Cardiovascular:  normal    Plan        Patient placed in a full body Monoplace Chamber #: [id number]  Treatment Start Time: 0846     Pressure Reached Time: 0855  EMILY : 2  Number of Air Breaks:        Decompression Time: 1026   Treatment End Time: 1032  Length of Treatment: 90 Minutes  Symptoms Noted During Treatment: None  Total Treatment Time (min): 106    Treatment Completion

## 2025-07-31 NOTE — PROGRESS NOTES
HBO PROGRESS NOTE      NAME: Dagoberto Echevarria  MEDICAL RECORD NUMBER:  [id number]  AGE: 65 y.o.   GENDER: male  : 1960  EPISODE DATE:  2025     Subjective     HBO Treatment Number: 20 out of Total Treatments: 40    HBO Diagnosis:             Indications: Radiation Cystitis w/Hematuria    Safety checks performed prior to treatment.  See doc flowsheets for documentation.    Objective        No results for input(s): \"POCGLU\" in the last 72 hours.  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)       Other    * (Principal) Late effect of radiation (Chronic)    Otalgia, bilateral       Physical Exam        General Appearance:  alert and oriented to person, place and time, well-developed and well-nourished, in no acute distress    Pre Tympanic Membrane Assessment:  Right: Normal       Post Tympanic Membrane Assessment:  Left: Normal  Right: Normal    Pulmonary/Chest:  clear to auscultation bilaterally- no wheezes, rales or rhonchi, normal air movement, no respiratory distress    Cardiovascular:  normal    Plan        Patient placed in a full body Monoplace Chamber #: [id number]  Treatment Start Time: 0849     Pressure Reached Time: 0858  EMILY : 2  Number of Air Breaks:        Decompression Time: 1029   Treatment End Time: 1035  Length of Treatment: 90 Minutes  Symptoms Noted During Treatment: None  Total Treatment Time (min): 106    Treatment Completion Status: Treatment completed without issue (d/c instructions reviewed, no questions)    I was present on these premises and immediately available to furnish assistance & direction throughout the HBO Treatment.     Dagoberto Echevarria is a 65 y.o. male  did

## 2025-08-01 ENCOUNTER — HOSPITAL ENCOUNTER (OUTPATIENT)
Dept: WOUND CARE | Age: 65
Discharge: HOME | End: 2025-08-01
Payer: COMMERCIAL

## 2025-08-01 VITALS
RESPIRATION RATE: 17 BRPM | HEART RATE: 65 BPM | SYSTOLIC BLOOD PRESSURE: 108 MMHG | DIASTOLIC BLOOD PRESSURE: 78 MMHG | OXYGEN SATURATION: 97 % | TEMPERATURE: 97.7 F

## 2025-08-01 DIAGNOSIS — H92.03 OTALGIA, BILATERAL: ICD-10-CM

## 2025-08-01 DIAGNOSIS — N30.41 IRRADIATION CYSTITIS WITH HEMATURIA: Primary | ICD-10-CM

## 2025-08-01 DIAGNOSIS — T66.XXXS LATE EFFECT OF RADIATION: ICD-10-CM

## 2025-08-01 PROCEDURE — G0277 HBOT, FULL BODY CHAMBER, 30M: HCPCS

## 2025-08-01 PROCEDURE — 99183 HYPERBARIC OXYGEN THERAPY: CPT | Performed by: FAMILY MEDICINE

## 2025-08-01 ASSESSMENT — PAIN SCALES - GENERAL
PAINLEVEL_OUTOF10: 0
PAINLEVEL_OUTOF10: 0

## 2025-08-04 ENCOUNTER — HOSPITAL ENCOUNTER (OUTPATIENT)
Dept: WOUND CARE | Age: 65
Discharge: HOME | End: 2025-08-04
Payer: COMMERCIAL

## 2025-08-04 VITALS
RESPIRATION RATE: 18 BRPM | TEMPERATURE: 97.7 F | OXYGEN SATURATION: 100 % | SYSTOLIC BLOOD PRESSURE: 130 MMHG | DIASTOLIC BLOOD PRESSURE: 68 MMHG | HEART RATE: 52 BPM

## 2025-08-04 DIAGNOSIS — N30.41 IRRADIATION CYSTITIS WITH HEMATURIA: Primary | ICD-10-CM

## 2025-08-04 DIAGNOSIS — T66.XXXS LATE EFFECT OF RADIATION: ICD-10-CM

## 2025-08-04 DIAGNOSIS — H92.03 OTALGIA, BILATERAL: ICD-10-CM

## 2025-08-04 PROCEDURE — 99183 HYPERBARIC OXYGEN THERAPY: CPT | Performed by: FAMILY MEDICINE

## 2025-08-04 PROCEDURE — G0277 HBOT, FULL BODY CHAMBER, 30M: HCPCS

## 2025-08-04 ASSESSMENT — PAIN SCALES - GENERAL: PAINLEVEL_OUTOF10: 0

## 2025-08-05 ENCOUNTER — HOSPITAL ENCOUNTER (OUTPATIENT)
Dept: WOUND CARE | Age: 65
Discharge: HOME | End: 2025-08-05
Payer: COMMERCIAL

## 2025-08-05 VITALS
DIASTOLIC BLOOD PRESSURE: 79 MMHG | HEART RATE: 62 BPM | OXYGEN SATURATION: 97 % | RESPIRATION RATE: 18 BRPM | SYSTOLIC BLOOD PRESSURE: 112 MMHG | TEMPERATURE: 98.1 F

## 2025-08-05 DIAGNOSIS — H92.03 OTALGIA, BILATERAL: ICD-10-CM

## 2025-08-05 DIAGNOSIS — N30.41 IRRADIATION CYSTITIS WITH HEMATURIA: Primary | ICD-10-CM

## 2025-08-05 DIAGNOSIS — T66.XXXS LATE EFFECT OF RADIATION: ICD-10-CM

## 2025-08-05 PROCEDURE — G0277 HBOT, FULL BODY CHAMBER, 30M: HCPCS

## 2025-08-05 PROCEDURE — 99183 HYPERBARIC OXYGEN THERAPY: CPT | Performed by: FAMILY MEDICINE

## 2025-08-05 ASSESSMENT — PAIN SCALES - GENERAL: PAINLEVEL_OUTOF10: 0

## 2025-08-06 ENCOUNTER — HOSPITAL ENCOUNTER (OUTPATIENT)
Dept: WOUND CARE | Age: 65
Discharge: HOME | End: 2025-08-06
Payer: COMMERCIAL

## 2025-08-06 VITALS
HEART RATE: 58 BPM | TEMPERATURE: 98.1 F | RESPIRATION RATE: 18 BRPM | OXYGEN SATURATION: 97 % | SYSTOLIC BLOOD PRESSURE: 115 MMHG | DIASTOLIC BLOOD PRESSURE: 64 MMHG

## 2025-08-06 DIAGNOSIS — H92.03 OTALGIA, BILATERAL: ICD-10-CM

## 2025-08-06 DIAGNOSIS — N30.41 IRRADIATION CYSTITIS WITH HEMATURIA: Primary | ICD-10-CM

## 2025-08-06 DIAGNOSIS — T66.XXXS LATE EFFECT OF RADIATION: ICD-10-CM

## 2025-08-06 PROCEDURE — G0277 HBOT, FULL BODY CHAMBER, 30M: HCPCS

## 2025-08-06 PROCEDURE — 99183 HYPERBARIC OXYGEN THERAPY: CPT | Performed by: FAMILY MEDICINE

## 2025-08-06 ASSESSMENT — PAIN SCALES - GENERAL
PAINLEVEL_OUTOF10: 0
PAINLEVEL_OUTOF10: 0

## 2025-08-07 ENCOUNTER — HOSPITAL ENCOUNTER (OUTPATIENT)
Dept: WOUND CARE | Age: 65
Discharge: HOME | End: 2025-08-07
Payer: COMMERCIAL

## 2025-08-07 VITALS
RESPIRATION RATE: 18 BRPM | HEART RATE: 56 BPM | OXYGEN SATURATION: 96 % | SYSTOLIC BLOOD PRESSURE: 107 MMHG | TEMPERATURE: 97.9 F | DIASTOLIC BLOOD PRESSURE: 77 MMHG

## 2025-08-07 DIAGNOSIS — T66.XXXS LATE EFFECT OF RADIATION: ICD-10-CM

## 2025-08-07 DIAGNOSIS — H92.03 OTALGIA, BILATERAL: ICD-10-CM

## 2025-08-07 DIAGNOSIS — N30.41 IRRADIATION CYSTITIS WITH HEMATURIA: Primary | ICD-10-CM

## 2025-08-07 PROCEDURE — 99183 HYPERBARIC OXYGEN THERAPY: CPT | Performed by: FAMILY MEDICINE

## 2025-08-07 PROCEDURE — G0277 HBOT, FULL BODY CHAMBER, 30M: HCPCS

## 2025-08-07 ASSESSMENT — PAIN SCALES - GENERAL
PAINLEVEL_OUTOF10: 0
PAINLEVEL_OUTOF10: 0

## 2025-08-08 ENCOUNTER — HOSPITAL ENCOUNTER (OUTPATIENT)
Dept: WOUND CARE | Age: 65
Discharge: HOME | End: 2025-08-08
Payer: COMMERCIAL

## 2025-08-08 VITALS
HEART RATE: 62 BPM | TEMPERATURE: 97.1 F | OXYGEN SATURATION: 98 % | SYSTOLIC BLOOD PRESSURE: 112 MMHG | RESPIRATION RATE: 18 BRPM | DIASTOLIC BLOOD PRESSURE: 75 MMHG

## 2025-08-08 DIAGNOSIS — T66.XXXS LATE EFFECT OF RADIATION: ICD-10-CM

## 2025-08-08 DIAGNOSIS — N30.41 IRRADIATION CYSTITIS WITH HEMATURIA: Primary | ICD-10-CM

## 2025-08-08 DIAGNOSIS — H92.03 OTALGIA, BILATERAL: ICD-10-CM

## 2025-08-08 PROCEDURE — G0277 HBOT, FULL BODY CHAMBER, 30M: HCPCS

## 2025-08-08 ASSESSMENT — PAIN SCALES - GENERAL
PAINLEVEL_OUTOF10: 0
PAINLEVEL_OUTOF10: 0

## 2025-08-08 ASSESSMENT — VISUAL ACUITY: OU: 1

## 2025-08-11 ENCOUNTER — HOSPITAL ENCOUNTER (OUTPATIENT)
Dept: WOUND CARE | Age: 65
Discharge: HOME | End: 2025-08-11
Payer: COMMERCIAL

## 2025-08-11 VITALS
OXYGEN SATURATION: 97 % | HEART RATE: 64 BPM | TEMPERATURE: 97.3 F | SYSTOLIC BLOOD PRESSURE: 117 MMHG | RESPIRATION RATE: 18 BRPM | DIASTOLIC BLOOD PRESSURE: 87 MMHG

## 2025-08-11 DIAGNOSIS — T66.XXXS LATE EFFECT OF RADIATION: ICD-10-CM

## 2025-08-11 DIAGNOSIS — H92.03 OTALGIA, BILATERAL: ICD-10-CM

## 2025-08-11 DIAGNOSIS — N30.41 IRRADIATION CYSTITIS WITH HEMATURIA: Primary | ICD-10-CM

## 2025-08-11 PROCEDURE — G0277 HBOT, FULL BODY CHAMBER, 30M: HCPCS

## 2025-08-11 PROCEDURE — 99183 HYPERBARIC OXYGEN THERAPY: CPT | Performed by: FAMILY MEDICINE

## 2025-08-11 ASSESSMENT — PAIN SCALES - GENERAL
PAINLEVEL_OUTOF10: 0
PAINLEVEL_OUTOF10: 0

## 2025-08-12 ENCOUNTER — HOSPITAL ENCOUNTER (OUTPATIENT)
Dept: WOUND CARE | Age: 65
Discharge: HOME | End: 2025-08-12
Payer: COMMERCIAL

## 2025-08-12 VITALS
TEMPERATURE: 97.7 F | RESPIRATION RATE: 17 BRPM | HEART RATE: 59 BPM | SYSTOLIC BLOOD PRESSURE: 121 MMHG | OXYGEN SATURATION: 100 % | DIASTOLIC BLOOD PRESSURE: 93 MMHG

## 2025-08-12 DIAGNOSIS — H92.03 OTALGIA, BILATERAL: ICD-10-CM

## 2025-08-12 DIAGNOSIS — T66.XXXS LATE EFFECT OF RADIATION: ICD-10-CM

## 2025-08-12 DIAGNOSIS — N30.41 IRRADIATION CYSTITIS WITH HEMATURIA: Primary | ICD-10-CM

## 2025-08-12 PROCEDURE — 99183 HYPERBARIC OXYGEN THERAPY: CPT | Performed by: FAMILY MEDICINE

## 2025-08-12 PROCEDURE — G0277 HBOT, FULL BODY CHAMBER, 30M: HCPCS

## 2025-08-12 ASSESSMENT — PAIN SCALES - GENERAL
PAINLEVEL_OUTOF10: 0
PAINLEVEL_OUTOF10: 0

## 2025-08-13 ENCOUNTER — HOSPITAL ENCOUNTER (OUTPATIENT)
Dept: WOUND CARE | Age: 65
Discharge: HOME | End: 2025-08-13
Payer: COMMERCIAL

## 2025-08-13 VITALS
RESPIRATION RATE: 18 BRPM | DIASTOLIC BLOOD PRESSURE: 82 MMHG | OXYGEN SATURATION: 100 % | HEART RATE: 63 BPM | TEMPERATURE: 98.1 F | SYSTOLIC BLOOD PRESSURE: 121 MMHG

## 2025-08-13 DIAGNOSIS — T66.XXXS LATE EFFECT OF RADIATION: ICD-10-CM

## 2025-08-13 DIAGNOSIS — H92.03 OTALGIA, BILATERAL: ICD-10-CM

## 2025-08-13 DIAGNOSIS — N30.41 IRRADIATION CYSTITIS WITH HEMATURIA: Primary | ICD-10-CM

## 2025-08-13 PROCEDURE — G0277 HBOT, FULL BODY CHAMBER, 30M: HCPCS

## 2025-08-13 PROCEDURE — 99183 HYPERBARIC OXYGEN THERAPY: CPT | Performed by: FAMILY MEDICINE

## 2025-08-13 ASSESSMENT — PAIN SCALES - GENERAL
PAINLEVEL_OUTOF10: 0
PAINLEVEL_OUTOF10: 0

## 2025-08-15 ENCOUNTER — HOSPITAL ENCOUNTER (OUTPATIENT)
Dept: WOUND CARE | Age: 65
Discharge: HOME | End: 2025-08-15
Payer: COMMERCIAL

## 2025-08-15 VITALS
TEMPERATURE: 98.2 F | SYSTOLIC BLOOD PRESSURE: 118 MMHG | DIASTOLIC BLOOD PRESSURE: 75 MMHG | OXYGEN SATURATION: 97 % | RESPIRATION RATE: 18 BRPM | HEART RATE: 58 BPM

## 2025-08-15 DIAGNOSIS — H92.03 OTALGIA, BILATERAL: ICD-10-CM

## 2025-08-15 DIAGNOSIS — T66.XXXS LATE EFFECT OF RADIATION: ICD-10-CM

## 2025-08-15 DIAGNOSIS — N30.41 IRRADIATION CYSTITIS WITH HEMATURIA: Primary | ICD-10-CM

## 2025-08-15 PROCEDURE — G0277 HBOT, FULL BODY CHAMBER, 30M: HCPCS

## 2025-08-15 ASSESSMENT — PAIN SCALES - GENERAL: PAINLEVEL_OUTOF10: 0

## 2025-08-18 ENCOUNTER — HOSPITAL ENCOUNTER (OUTPATIENT)
Dept: WOUND CARE | Age: 65
Discharge: HOME | End: 2025-08-18
Payer: COMMERCIAL

## 2025-08-18 VITALS
OXYGEN SATURATION: 99 % | TEMPERATURE: 97.7 F | HEART RATE: 64 BPM | RESPIRATION RATE: 18 BRPM | DIASTOLIC BLOOD PRESSURE: 72 MMHG | SYSTOLIC BLOOD PRESSURE: 113 MMHG

## 2025-08-18 DIAGNOSIS — H92.03 OTALGIA, BILATERAL: ICD-10-CM

## 2025-08-18 DIAGNOSIS — N30.41 IRRADIATION CYSTITIS WITH HEMATURIA: Primary | ICD-10-CM

## 2025-08-18 DIAGNOSIS — T66.XXXS LATE EFFECT OF RADIATION: ICD-10-CM

## 2025-08-18 PROCEDURE — 99183 HYPERBARIC OXYGEN THERAPY: CPT | Performed by: FAMILY MEDICINE

## 2025-08-18 PROCEDURE — G0277 HBOT, FULL BODY CHAMBER, 30M: HCPCS

## 2025-08-18 ASSESSMENT — PAIN SCALES - GENERAL: PAINLEVEL_OUTOF10: 0

## 2025-08-19 ENCOUNTER — HOSPITAL ENCOUNTER (OUTPATIENT)
Dept: WOUND CARE | Age: 65
Discharge: HOME | End: 2025-08-19
Payer: COMMERCIAL

## 2025-08-19 VITALS
TEMPERATURE: 97.8 F | DIASTOLIC BLOOD PRESSURE: 81 MMHG | OXYGEN SATURATION: 96 % | RESPIRATION RATE: 18 BRPM | SYSTOLIC BLOOD PRESSURE: 126 MMHG | HEART RATE: 61 BPM

## 2025-08-19 DIAGNOSIS — H92.03 OTALGIA, BILATERAL: ICD-10-CM

## 2025-08-19 DIAGNOSIS — N30.41 IRRADIATION CYSTITIS WITH HEMATURIA: Primary | ICD-10-CM

## 2025-08-19 DIAGNOSIS — T66.XXXS LATE EFFECT OF RADIATION: ICD-10-CM

## 2025-08-19 PROCEDURE — 99183 HYPERBARIC OXYGEN THERAPY: CPT | Performed by: FAMILY MEDICINE

## 2025-08-19 PROCEDURE — G0277 HBOT, FULL BODY CHAMBER, 30M: HCPCS

## 2025-08-19 ASSESSMENT — PAIN SCALES - GENERAL
PAINLEVEL_OUTOF10: 0
PAINLEVEL_OUTOF10: 0

## 2025-08-20 ENCOUNTER — HOSPITAL ENCOUNTER (OUTPATIENT)
Dept: WOUND CARE | Age: 65
Discharge: HOME | End: 2025-08-20
Payer: COMMERCIAL

## 2025-08-20 VITALS
TEMPERATURE: 97.9 F | SYSTOLIC BLOOD PRESSURE: 127 MMHG | HEART RATE: 56 BPM | DIASTOLIC BLOOD PRESSURE: 81 MMHG | RESPIRATION RATE: 18 BRPM | OXYGEN SATURATION: 96 %

## 2025-08-20 DIAGNOSIS — H92.03 OTALGIA, BILATERAL: ICD-10-CM

## 2025-08-20 DIAGNOSIS — N30.41 IRRADIATION CYSTITIS WITH HEMATURIA: Primary | ICD-10-CM

## 2025-08-20 DIAGNOSIS — T66.XXXS LATE EFFECT OF RADIATION: ICD-10-CM

## 2025-08-20 PROCEDURE — 99183 HYPERBARIC OXYGEN THERAPY: CPT | Performed by: FAMILY MEDICINE

## 2025-08-20 PROCEDURE — G0277 HBOT, FULL BODY CHAMBER, 30M: HCPCS

## 2025-08-20 ASSESSMENT — PAIN SCALES - GENERAL: PAINLEVEL_OUTOF10: 0

## 2025-08-21 ENCOUNTER — HOSPITAL ENCOUNTER (OUTPATIENT)
Dept: WOUND CARE | Age: 65
Discharge: HOME | End: 2025-08-21
Payer: COMMERCIAL

## 2025-08-21 VITALS
DIASTOLIC BLOOD PRESSURE: 70 MMHG | RESPIRATION RATE: 17 BRPM | HEART RATE: 59 BPM | SYSTOLIC BLOOD PRESSURE: 113 MMHG | OXYGEN SATURATION: 98 % | TEMPERATURE: 97.7 F

## 2025-08-21 DIAGNOSIS — N30.41 IRRADIATION CYSTITIS WITH HEMATURIA: Primary | ICD-10-CM

## 2025-08-21 DIAGNOSIS — T66.XXXS LATE EFFECT OF RADIATION: ICD-10-CM

## 2025-08-21 DIAGNOSIS — H92.03 OTALGIA, BILATERAL: ICD-10-CM

## 2025-08-21 PROCEDURE — G0277 HBOT, FULL BODY CHAMBER, 30M: HCPCS

## 2025-08-21 PROCEDURE — 99183 HYPERBARIC OXYGEN THERAPY: CPT | Performed by: FAMILY MEDICINE

## 2025-08-22 ENCOUNTER — HOSPITAL ENCOUNTER (OUTPATIENT)
Dept: WOUND CARE | Age: 65
Discharge: HOME | End: 2025-08-22
Payer: COMMERCIAL

## 2025-08-22 VITALS
OXYGEN SATURATION: 96 % | DIASTOLIC BLOOD PRESSURE: 64 MMHG | TEMPERATURE: 97.5 F | RESPIRATION RATE: 18 BRPM | HEART RATE: 58 BPM | SYSTOLIC BLOOD PRESSURE: 155 MMHG

## 2025-08-22 DIAGNOSIS — H92.03 OTALGIA, BILATERAL: ICD-10-CM

## 2025-08-22 DIAGNOSIS — N30.41 IRRADIATION CYSTITIS WITH HEMATURIA: Primary | ICD-10-CM

## 2025-08-22 DIAGNOSIS — T66.XXXS LATE EFFECT OF RADIATION: ICD-10-CM

## 2025-08-22 PROCEDURE — G0277 HBOT, FULL BODY CHAMBER, 30M: HCPCS

## 2025-08-22 ASSESSMENT — PAIN SCALES - GENERAL: PAINLEVEL_OUTOF10: 0

## 2025-08-25 ENCOUNTER — HOSPITAL ENCOUNTER (OUTPATIENT)
Dept: WOUND CARE | Age: 65
Discharge: HOME | End: 2025-08-25
Payer: COMMERCIAL

## 2025-08-25 VITALS
SYSTOLIC BLOOD PRESSURE: 112 MMHG | DIASTOLIC BLOOD PRESSURE: 86 MMHG | RESPIRATION RATE: 18 BRPM | OXYGEN SATURATION: 97 % | HEART RATE: 62 BPM | TEMPERATURE: 97.7 F

## 2025-08-25 DIAGNOSIS — H92.03 OTALGIA, BILATERAL: ICD-10-CM

## 2025-08-25 DIAGNOSIS — T66.XXXS LATE EFFECT OF RADIATION: ICD-10-CM

## 2025-08-25 DIAGNOSIS — N30.41 IRRADIATION CYSTITIS WITH HEMATURIA: Primary | ICD-10-CM

## 2025-08-25 PROCEDURE — 99183 HYPERBARIC OXYGEN THERAPY: CPT | Performed by: FAMILY MEDICINE

## 2025-08-25 PROCEDURE — G0277 HBOT, FULL BODY CHAMBER, 30M: HCPCS

## 2025-08-25 ASSESSMENT — PAIN SCALES - GENERAL
PAINLEVEL_OUTOF10: 0
PAINLEVEL_OUTOF10: 0

## 2025-08-26 ENCOUNTER — HOSPITAL ENCOUNTER (OUTPATIENT)
Dept: WOUND CARE | Age: 65
Discharge: HOME | End: 2025-08-26
Payer: COMMERCIAL

## 2025-08-26 VITALS
OXYGEN SATURATION: 93 % | HEART RATE: 63 BPM | DIASTOLIC BLOOD PRESSURE: 93 MMHG | RESPIRATION RATE: 18 BRPM | TEMPERATURE: 98.1 F | SYSTOLIC BLOOD PRESSURE: 135 MMHG

## 2025-08-26 DIAGNOSIS — T66.XXXS LATE EFFECT OF RADIATION: ICD-10-CM

## 2025-08-26 DIAGNOSIS — N30.41 IRRADIATION CYSTITIS WITH HEMATURIA: Primary | ICD-10-CM

## 2025-08-26 DIAGNOSIS — H92.03 OTALGIA, BILATERAL: ICD-10-CM

## 2025-08-26 PROCEDURE — G0277 HBOT, FULL BODY CHAMBER, 30M: HCPCS

## 2025-08-26 PROCEDURE — 99183 HYPERBARIC OXYGEN THERAPY: CPT | Performed by: FAMILY MEDICINE

## 2025-08-26 ASSESSMENT — PAIN SCALES - GENERAL
PAINLEVEL_OUTOF10: 0
PAINLEVEL_OUTOF10: 0

## (undated) DEVICE — SUTURE CHROMIC GUT SZ 4-0 L27IN ABSRB BRN L26MM SH 1/2 CIR G121H

## (undated) DEVICE — PENCIL ES L3M BTTN SWCH HOLSTER W/ BLDE ELECTRD EDGE

## (undated) DEVICE — BANDAGE,GAUZE,BULKEE II,4.5"X4.1YD,STRL: Brand: MEDLINE

## (undated) DEVICE — ELECTRODE PT RET AD L9FT HI MOIST COND ADH HYDRGEL CORDED

## (undated) DEVICE — SUTURE ETHLN SZ 2-0 L18IN NONABSORBABLE BLK L26MM PS 3/8 585H

## (undated) DEVICE — DEVICE SEAL L23CM NANO COAT MARYLAND JAW OPN DIV LIGASURE

## (undated) DEVICE — SMALL BOWL SET-LF: Brand: MEDLINE INDUSTRIES, INC.

## (undated) DEVICE — GOWN,REINFORCED,POLY,AURORA,XXLARGE,STR: Brand: MEDLINE

## (undated) DEVICE — PAD,NON-ADHERENT,3X8,STERILE,LF,1/PK: Brand: MEDLINE

## (undated) DEVICE — GLOVE SURG SZ 8 CRM LTX FREE POLYISOPRENE POLYMER BEAD ANTI

## (undated) DEVICE — SHEET,DRAPE,53X77,STERILE: Brand: MEDLINE

## (undated) DEVICE — PREMIUM WET SKIN PREP TRAY: Brand: MEDLINE INDUSTRIES, INC.

## (undated) DEVICE — SPONGE: SPECIALTY PEANUT XR 100/CS: Brand: MEDICAL ACTION INDUSTRIES

## (undated) DEVICE — SUTURE MCRYL SZ 2-0 L27IN ABSRB UD L26MM SH UR-6 1/2 CIR D8550

## (undated) DEVICE — BLADE ES L6IN ELASTOMERIC COAT EXT DURABLE BEND UPTO 90DEG

## (undated) DEVICE — APPLIER LIG CLP M L11IN TI STR RNG HNDL FOR 20 CLP DISP

## (undated) DEVICE — ASCOPE 4 CYSTO - REVERSE DEFLECTION: Brand: ASCOPE™ 4 CYSTO

## (undated) DEVICE — MINOR LITHOTOMY PACK: Brand: MEDLINE INDUSTRIES, INC.

## (undated) DEVICE — COVER,MAYO STAND,STERILE: Brand: MEDLINE

## (undated) DEVICE — ATHLETIC SUPPORTER LATEX FREE, LARGE

## (undated) DEVICE — SUTURE VICRYL + SZ 3-0 L27IN ABSRB UD L26MM SH 1/2 CIR VCP416H

## (undated) DEVICE — SURGICAL PROCEDURE PACK BASIC ST FRANCIS

## (undated) DEVICE — LIQUIBAND RAPID ADHESIVE 36/CS 0.8ML: Brand: MEDLINE

## (undated) DEVICE — SUTURE VCRL SZ 0 L36IN ABSRB UD L36MM CT-1 1/2 CIR J946H

## (undated) DEVICE — YANKAUER,BULB TIP,W/O VENT,RIGID,STERILE: Brand: MEDLINE

## (undated) DEVICE — APPLIER CLP L L13IN TI MULT RNG HNDL 20 CLP STR LIGACLP

## (undated) DEVICE — SHEET, T, LAPAROTOMY, STERILE: Brand: MEDLINE

## (undated) DEVICE — CATHETER,FOLEY,SILI-ELAST,LTX,20FR,10ML: Brand: MEDLINE

## (undated) DEVICE — 3M™ IOBAN™ 2 ANTIMICROBIAL INCISE DRAPE 6650EZ: Brand: IOBAN™ 2

## (undated) DEVICE — INTENDED TO BE USED TO OCCLUDE, RETRACT AND IDENTIFY ARTERIES, VEINS, TENDONS AND NERVES IN SURGICAL PROCEDURES: Brand: STERION®  VESSEL LOOP

## (undated) DEVICE — SMARTSLEEVE SURGICAL GOWN, 2XL: Brand: CONVERTORS

## (undated) DEVICE — SUTURE PDS II SZ 1 L96IN ABSRB VLT TP-1 L65MM 1/2 CIR Z880G

## (undated) DEVICE — STERILE PACKED. BORE DIAMETER 1.6 MM; ANGLE OF INSERTION 19° TO THE LONG AXIS OF THE TRANSDUCER: Brand: SINGLE-USE BIPLANE BIOPSY GUIDE

## (undated) DEVICE — SYRINGE CATH TIP 50ML

## (undated) DEVICE — BAG,DRAINAGE,4L,A/R TOWER,LL,SLIDE TAP: Brand: MEDLINE

## (undated) DEVICE — SUTURE CHROMIC GUT SZ 2-0 L27IN ABSRB BRN L26MM SH 1/2 CIR G123H

## (undated) DEVICE — JELLY LUBRICATING 10GM PREFIL SYR LUBE

## (undated) DEVICE — RESERVOIR,SUCTION,100CC,SILICONE: Brand: MEDLINE

## (undated) DEVICE — SUTURE PDS + SZ 1 L96IN ABSRB VLT L65MM TP-1 1/2 CIR PDP880G

## (undated) DEVICE — SOLUTION IRRIG 1000ML STRL H2O USP PLAS POUR BTL

## (undated) DEVICE — Device

## (undated) DEVICE — DRAIN SURG 19FR 100% SIL RND END PERF

## (undated) DEVICE — CATHETER URETH 20FR BLLN 30CC F 3 W SPEC M RND TIP 2

## (undated) DEVICE — SUTURE VICRYL SZ 3-0 L27IN ABSRB UD L26MM CT-2 1/2 CIR J232H

## (undated) DEVICE — SYRINGE MED 10ML LUERLOCK TIP W/O SFTY DISP

## (undated) DEVICE — HOLDER PRB URONAV

## (undated) DEVICE — ACCESSORY KIT QUICK CONNECT WINDOW CONNECTORS (3 STRAIGHT, 2 RIGHT ANGLE, 1 Y), SUTURE-TIE CONNECTORS (3 STRAIGHT, 2 RIGHT ANGLE, 1 Y), 8 COLLETS, 1 COLLET HOLDER, 1 CUFF SIZER, 2 22-GAUGE BLUNT TIP NEEDLES, 2 15-GAUGE BLUNT TIP NEEDLES, 2 30 CM LENGTHS OF TUBING
Type: IMPLANTABLE DEVICE | Site: PERINEUM | Status: NON-FUNCTIONAL
Brand: AMS 800 ARTIFICIAL URINARY SPHINCTER

## (undated) DEVICE — MAX-CORE® DISPOSABLE CORE BIOPSY INSTRUMENT, 18G X 25CM: Brand: MAX-CORE

## (undated) DEVICE — GOWN,REINF,POLY,ECL,PP SLV,XL: Brand: MEDLINE

## (undated) DEVICE — SOLUTION IV 1000ML 0.9% SOD CHL PH 5 INJ USP VIAFLX PLAS

## (undated) DEVICE — SYRINGE MED 30ML STD CLR PLAS LUERLOCK TIP N CTRL DISP

## (undated) DEVICE — SEALER LAP L37CM MARYLAND JAW OPN NANO COAT MULTIFUNCTIONAL

## (undated) DEVICE — CATHETER F BLLN 5CC 14FR 2 W HYDRGEL COAT LESS TRAUM LUB

## (undated) DEVICE — DEVICE ERECTILE RESTR FOR PENILE PROSTHESIS

## (undated) DEVICE — COVER PRB W1XL11.8IN ENDOCAVITY CLR E BND NEOGUARD

## (undated) DEVICE — CYSTO/BLADDER IRRIGATION SET, REGULATING CLAMP

## (undated) DEVICE — DRAPE TWL SURG 16X26IN BLU ORB04] ALLCARE INC]

## (undated) DEVICE — LOOP VES L406MM DIA1MM MAXI BLU SIL RADPQ DISP